# Patient Record
Sex: FEMALE | HISPANIC OR LATINO | Employment: PART TIME | URBAN - METROPOLITAN AREA
[De-identification: names, ages, dates, MRNs, and addresses within clinical notes are randomized per-mention and may not be internally consistent; named-entity substitution may affect disease eponyms.]

---

## 2017-04-05 ENCOUNTER — OFFICE VISIT (OUTPATIENT)
Dept: URGENT CARE | Facility: URGENT CARE | Age: 32
End: 2017-04-05
Payer: COMMERCIAL

## 2017-04-05 VITALS
SYSTOLIC BLOOD PRESSURE: 128 MMHG | OXYGEN SATURATION: 100 % | TEMPERATURE: 99 F | WEIGHT: 154.4 LBS | BODY MASS INDEX: 27.35 KG/M2 | DIASTOLIC BLOOD PRESSURE: 84 MMHG | HEART RATE: 86 BPM

## 2017-04-05 DIAGNOSIS — Z32.00 POSSIBLE PREGNANCY: ICD-10-CM

## 2017-04-05 DIAGNOSIS — Z32.01 PREGNANCY TEST POSITIVE: Primary | ICD-10-CM

## 2017-04-05 LAB — BETA HCG QUAL IFA URINE: POSITIVE

## 2017-04-05 PROCEDURE — 84703 CHORIONIC GONADOTROPIN ASSAY: CPT | Performed by: FAMILY MEDICINE

## 2017-04-05 PROCEDURE — 99213 OFFICE O/P EST LOW 20 MIN: CPT | Performed by: FAMILY MEDICINE

## 2017-04-05 RX ORDER — PRENATAL VIT/IRON FUM/FOLIC AC 27MG-0.8MG
1 TABLET ORAL DAILY
Qty: 100 TABLET | Refills: 0 | Status: SHIPPED | OUTPATIENT
Start: 2017-04-05 | End: 2017-07-03

## 2017-04-05 NOTE — PATIENT INSTRUCTIONS
Okay for tylenol for discomfort.  Watch for cramping and bleeding - this would be concerning for miscarriage.  Follow up with primary or OB    Possible Miscarriage (Threatened )  You may be having a miscarriage.  Common signs of a miscarriage are pain and bleeding. A small amount of bleeding can be normal during the first 3 months of pregnancy. Often the pain and bleeding stop, and you have a normal pregnancy and baby. But heavy bleeding or severe cramping can be an early sign of miscarriage. A  miscarriage  means an unexpected loss of your pregnancy.  At this time, we don t know whether you will have a miscarriage, or if things will clear up and your pregnancy will continue normally. We understand that this is emotionally difficult. There is little we can say to change the way you feel. But understand that miscarriages are common.  About 1 or 2 out of every 10 pregnancies end this way. Some end even before you know you are pregnant. This happens for a number of reasons, and usually we never figure out why. It s important you know that it is not your fault. It didn t happen because you did anything wrong.  Having sex or exercising does not cause a miscarriage. These activities are usually safe unless you have pain or bleeding or your doctor tells you to stop. Even minor falls won t cause a miscarriage. Miscarriages happen because things were not developing as they were supposed to. No medicine can prevent a miscarriage.  Again, understand that things are uncertain right now. You may still have some bleeding. This may be light spotting or like a period, and you may pass some tissue. You may have some cramping. This is why follow-up care is important.  Home care  To improve the chance of keeping your pregnancy, you should take these steps:    Rest in bed until the pain and bleeding stop.    Don t have sex until your health care provider says it s OK.    Use sanitary napkins instead of tampons.    Don t  douche.    Don t take aspirin, ibuprofen, or naproxen.    Don t have alcoholic or caffeinated beverage or smoke.  Follow-up care  Make an appointment with your doctor within the next week, or as directed by our staff.  Note: If you had an ultrasound, a radiologist will review it. You will be told of any new findings that may affect your care.  Call 911  Call 911 if you have:    Severe pain and very heavy bleeding    Severe lightheadedness, passing out, or fainting    Rapid heart rate    Difficulty breathing    Confusion or difficulty waking up  When to seek medical advice  Call your health care provider right away if any of these occur:    Vaginal bleeding or pain that lasts for more than 3 days    Heavy bleeding. This means soaking 1 new pad an hour over 3 hours.    Fever of 100.4 F (38 C) or higher, or as directed by your health care provider    Pain in your lower belly (abdomen) that gets worse    Weakness or dizziness    Passage of anything that resembles tissue. This would be pink or grayish membrane or solid material. Save the tissue in a clean container and bring it to your provider.       0316-5244 [employer]. [address] [place] [address], [place] [address]. All rights reserved. This information is not intended as a substitute for professional medical care. Always follow your healthcare professional's instructions.

## 2017-04-05 NOTE — NURSING NOTE
"Chief Complaint   Patient presents with     Fall     fell down 2 steps last night fell back and landed right hip experiencing dizziness and cramping, discomfort  home pregnancy yesterday was positive        Initial /84 (BP Location: Right arm, Cuff Size: Adult Regular)  Pulse 86  Temp 99  F (37.2  C) (Oral)  Wt 154 lb 6.4 oz (70 kg)  SpO2 100%  BMI 27.35 kg/m2 Estimated body mass index is 27.35 kg/(m^2) as calculated from the following:    Height as of 10/7/15: 5' 3\" (1.6 m).    Weight as of this encounter: 154 lb 6.4 oz (70 kg).  Medication Reconciliation: complete   Manju Velazquez MA      "

## 2017-04-05 NOTE — MR AVS SNAPSHOT
After Visit Summary   2017    Rosette Aguilar    MRN: [id number]           Patient Information     Date Of Birth          1985        Visit Information        Provider Department      2017 9:00 AM Marquise Chacko MD [facility]        Today's Diagnoses     Possible pregnancy    -  1    Pregnancy test positive          Care Instructions    Okay for tylenol for discomfort.  Watch for cramping and bleeding - this would be concerning for miscarriage.  Follow up with primary or OB    Possible Miscarriage (Threatened )  You may be having a miscarriage.  Common signs of a miscarriage are pain and bleeding. A small amount of bleeding can be normal during the first 3 months of pregnancy. Often the pain and bleeding stop, and you have a normal pregnancy and baby. But heavy bleeding or severe cramping can be an early sign of miscarriage. A  miscarriage  means an unexpected loss of your pregnancy.  At this time, we don t know whether you will have a miscarriage, or if things will clear up and your pregnancy will continue normally. We understand that this is emotionally difficult. There is little we can say to change the way you feel. But understand that miscarriages are common.  About 1 or 2 out of every 10 pregnancies end this way. Some end even before you know you are pregnant. This happens for a number of reasons, and usually we never figure out why. It s important you know that it is not your fault. It didn t happen because you did anything wrong.  Having sex or exercising does not cause a miscarriage. These activities are usually safe unless you have pain or bleeding or your doctor tells you to stop. Even minor falls won t cause a miscarriage. Miscarriages happen because things were not developing as they were supposed to. No medicine can prevent a miscarriage.  Again, understand that things are uncertain right now. You may still have some bleeding. This may be light spotting  or like a period, and you may pass some tissue. You may have some cramping. This is why follow-up care is important.  Home care  To improve the chance of keeping your pregnancy, you should take these steps:    Rest in bed until the pain and bleeding stop.    Don t have sex until your health care provider says it s OK.    Use sanitary napkins instead of tampons.    Don t douche.    Don t take aspirin, ibuprofen, or naproxen.    Don t have alcoholic or caffeinated beverage or smoke.  Follow-up care  Make an appointment with your doctor within the next week, or as directed by our staff.  Note: If you had an ultrasound, a radiologist will review it. You will be told of any new findings that may affect your care.  Call 911  Call 911 if you have:    Severe pain and very heavy bleeding    Severe lightheadedness, passing out, or fainting    Rapid heart rate    Difficulty breathing    Confusion or difficulty waking up  When to seek medical advice  Call your health care provider right away if any of these occur:    Vaginal bleeding or pain that lasts for more than 3 days    Heavy bleeding. This means soaking 1 new pad an hour over 3 hours.    Fever of 100.4 F (38 C) or higher, or as directed by your health care provider    Pain in your lower belly (abdomen) that gets worse    Weakness or dizziness    Passage of anything that resembles tissue. This would be pink or grayish membrane or solid material. Save the tissue in a clean container and bring it to your provider.       6567-6014 [employer]. [address] [place] [address]. All rights reserved. This information is not intended as a substitute for professional medical care. Always follow your healthcare professional's instructions.              Follow-ups after your visit        Your next 10 appointments already scheduled     Apr 18, 2017  8:30 AM CDT   New Prenatal with EA OB NURSE   [facility] [place] [facility]    [address] [place] [address]  "[facility]  [address]  [place] [address] [phone]            May 19, 2017  9:45 AM CDT   New Prenatal with Allyson Church MD   [facility] [place] [facility]    [address] [facility]  [address]  [place] [address] [phone]              Who to contact     If you have questions or need follow up information about today's clinic visit or your schedule please contact [facility] directly at [phone].  Normal or non-critical lab and imaging results will be communicated to you by Healthagenhart, letter or phone within 4 business days after the clinic has received the results. If you do not hear from us within 7 days, please contact the clinic through Coinplugt or phone. If you have a critical or abnormal lab result, we will notify you by phone as soon as possible.  Submit refill requests through Vigo or call your pharmacy and they will forward the refill request to us. Please allow 3 business days for your refill to be completed.          Additional Information About Your Visit        Vigo Information     Vigo lets you send messages to your doctor, view your test results, renew your prescriptions, schedule appointments and more. To sign up, go to [email or web address] . Click on \"Log in\" on the left side of the screen, which will take you to the Welcome page. Then click on \"Sign up Now\" on the right side of the page.     You will be asked to enter the access code listed below, as well as some personal information. Please follow the directions to create your username and password.     Your access code is: [email or web address]  Expires: 2017 10:05 AM     Your access code will  in 90 days. If you need help or a new code, please call your [place] clinic or [phone].        Care EveryWhere ID     This is your Care EveryWhere ID. This could be used by other organizations to access your [place] medical records  [id number]        Your Vitals " Were     Pulse Temperature Pulse Oximetry BMI (Body Mass Index)          86 99  F (37.2  C) (Oral) 100% 27.35 kg/m2         Blood Pressure from Last 3 Encounters:   04/05/17 128/84   12/14/15 108/70   10/07/15 126/58    Weight from Last 3 Encounters:   04/05/17 154 lb 6.4 oz (70 kg)   12/14/15 149 lb (67.6 kg)   10/07/15 147 lb (66.7 kg)              We Performed the Following     Beta HCG qual IFA urine          Today's Medication Changes          These changes are accurate as of: 4/5/17 10:05 AM.  If you have any questions, ask your nurse or doctor.               Start taking these medicines.        Dose/Directions    prenatal multivitamin  plus iron 27-0.8 MG Tabs per tablet   Used for:  Pregnancy test positive   Started by:  Marquise Chacko MD        Dose:  1 tablet   Take 1 tablet by mouth daily   Quantity:  100 tablet   Refills:  0            Where to get your medicines      These medications were sent to [id number] - [place], [place] - [address]  [address], [facility] [address]     Phone:  [phone]     prenatal multivitamin  plus iron 27-0.8 MG Tabs per tablet                Primary Care Provider Office Phone # Fax #    BRIAN [facility] [phone]       [facility] [address] [facility] [address]        Thank you!     Thank you for choosing [facility]  for your care. Our goal is always to provide you with excellent care. Hearing back from our patients is one way we can continue to improve our services. Please take a few minutes to complete the written survey that you may receive in the mail after your visit with us. Thank you!             Your Updated Medication List - Protect others around you: Learn how to safely use, store and throw away your medicines at www.disposemymeds.org.          This list is accurate as of: 4/5/17 10:05 AM.  Always use your most recent med list.                   Brand Name Dispense Instructions  for use    NO ACTIVE MEDICATIONS          prenatal multivitamin  plus iron 27-0.8 MG Tabs per tablet     100 tablet    Take 1 tablet by mouth daily

## 2017-04-18 ENCOUNTER — PRENATAL OFFICE VISIT (OUTPATIENT)
Dept: NURSING | Facility: CLINIC | Age: 32
End: 2017-04-18
Payer: COMMERCIAL

## 2017-04-18 VITALS
WEIGHT: 155 LBS | HEIGHT: 63 IN | SYSTOLIC BLOOD PRESSURE: 110 MMHG | DIASTOLIC BLOOD PRESSURE: 64 MMHG | BODY MASS INDEX: 27.46 KG/M2

## 2017-04-18 DIAGNOSIS — Z34.81 ENCOUNTER FOR SUPERVISION OF OTHER NORMAL PREGNANCY IN FIRST TRIMESTER: Primary | ICD-10-CM

## 2017-04-18 PROBLEM — Z34.90 SUPERVISION OF NORMAL PREGNANCY: Status: ACTIVE | Noted: 2017-04-18

## 2017-04-18 LAB
ABO + RH BLD: NORMAL
ABO + RH BLD: NORMAL
ALBUMIN UR-MCNC: NEGATIVE MG/DL
APPEARANCE UR: CLEAR
BILIRUB UR QL STRIP: NEGATIVE
BLD GP AB SCN SERPL QL: NORMAL
BLOOD BANK CMNT PATIENT-IMP: NORMAL
COLOR UR AUTO: YELLOW
ERYTHROCYTE [DISTWIDTH] IN BLOOD BY AUTOMATED COUNT: 13.5 % (ref 10–15)
GLUCOSE UR STRIP-MCNC: NEGATIVE MG/DL
HBV SURFACE AG SERPL QL IA: NONREACTIVE
HCT VFR BLD AUTO: 38.1 % (ref 35–47)
HGB BLD-MCNC: 12.6 G/DL (ref 11.7–15.7)
HGB UR QL STRIP: NEGATIVE
HIV 1+2 AB+HIV1 P24 AG SERPL QL IA: NORMAL
KETONES UR STRIP-MCNC: NEGATIVE MG/DL
LEUKOCYTE ESTERASE UR QL STRIP: ABNORMAL
MCH RBC QN AUTO: 30.5 PG (ref 26.5–33)
MCHC RBC AUTO-ENTMCNC: 33.1 G/DL (ref 31.5–36.5)
MCV RBC AUTO: 92 FL (ref 78–100)
NITRATE UR QL: NEGATIVE
NON-SQ EPI CELLS #/AREA URNS LPF: NORMAL /LPF
PH UR STRIP: 7.5 PH (ref 5–7)
PLATELET # BLD AUTO: 259 10E9/L (ref 150–450)
RBC # BLD AUTO: 4.13 10E12/L (ref 3.8–5.2)
RBC #/AREA URNS AUTO: NORMAL /HPF (ref 0–2)
RUBV IGG SERPL IA-ACNC: 52 IU/ML
SP GR UR STRIP: 1.01 (ref 1–1.03)
SPECIMEN EXP DATE BLD: NORMAL
T PALLIDUM IGG+IGM SER QL: NEGATIVE
URN SPEC COLLECT METH UR: ABNORMAL
UROBILINOGEN UR STRIP-ACNC: 0.2 EU/DL (ref 0.2–1)
WBC # BLD AUTO: 8.7 10E9/L (ref 4–11)
WBC #/AREA URNS AUTO: NORMAL /HPF (ref 0–2)

## 2017-04-18 PROCEDURE — 87086 URINE CULTURE/COLONY COUNT: CPT | Performed by: OBSTETRICS & GYNECOLOGY

## 2017-04-18 PROCEDURE — 86850 RBC ANTIBODY SCREEN: CPT | Performed by: OBSTETRICS & GYNECOLOGY

## 2017-04-18 PROCEDURE — 87389 HIV-1 AG W/HIV-1&-2 AB AG IA: CPT | Performed by: OBSTETRICS & GYNECOLOGY

## 2017-04-18 PROCEDURE — 86901 BLOOD TYPING SEROLOGIC RH(D): CPT | Performed by: OBSTETRICS & GYNECOLOGY

## 2017-04-18 PROCEDURE — 36415 COLL VENOUS BLD VENIPUNCTURE: CPT | Performed by: OBSTETRICS & GYNECOLOGY

## 2017-04-18 PROCEDURE — 86780 TREPONEMA PALLIDUM: CPT | Performed by: OBSTETRICS & GYNECOLOGY

## 2017-04-18 PROCEDURE — 85027 COMPLETE CBC AUTOMATED: CPT | Performed by: OBSTETRICS & GYNECOLOGY

## 2017-04-18 PROCEDURE — 86762 RUBELLA ANTIBODY: CPT | Performed by: OBSTETRICS & GYNECOLOGY

## 2017-04-18 PROCEDURE — 99207 ZZC PRENATAL VISIT: CPT

## 2017-04-18 PROCEDURE — 81001 URINALYSIS AUTO W/SCOPE: CPT | Performed by: OBSTETRICS & GYNECOLOGY

## 2017-04-18 PROCEDURE — 87340 HEPATITIS B SURFACE AG IA: CPT | Performed by: OBSTETRICS & GYNECOLOGY

## 2017-04-18 PROCEDURE — 86900 BLOOD TYPING SEROLOGIC ABO: CPT | Performed by: OBSTETRICS & GYNECOLOGY

## 2017-04-18 NOTE — PROGRESS NOTES
"Chief Complaint   Patient presents with     Prenatal Care     New Prenatal Nurse Visit   8w1d    Initial /64 (BP Location: Right arm, Cuff Size: Adult Regular)  Ht 5' 3\" (1.6 m)  Wt 155 lb (70.3 kg)  LMP 02/20/2017 (Approximate)  BMI 27.46 kg/m2 Estimated body mass index is 27.46 kg/(m^2) as calculated from the following:    Height as of this encounter: 5' 3\" (1.6 m).    Weight as of this encounter: 155 lb (70.3 kg).  Medication Reconciliation: complete     Patient is accompanied by  and son. Prenatal book and folder (containing standard educational hand-outs and brochures) given to patient. Information in folder reviewed. Questions answered.     Discussed and gave written information on options available for 1st and 2nd trimester screening, CVS, amniocentesis, AFP, and QUAD screen plus optimal time-frame for testing. Brochure given on optional screening available to determine Cystic Fibrosis carrier status. Pt instructed to check with insurance regarding coverage of these optional tests. Pt advised to call back if she desires testing or has any questions or concerns.  First Trimester Ultrasound ordered and scheduled.    Prenatal labs obtained. New prenatal visit scheduled on 5/19/17 with Dr. Church.  Frances Celis RN      "

## 2017-04-18 NOTE — MR AVS SNAPSHOT
After Visit Summary   4/18/2017    Rosette Aguilar    MRN: [id number]           Patient Information     Date Of Birth          1985        Visit Information        Provider Department      4/18/2017 8:30 AM EA OB NURSE [facility]        Today's Diagnoses     Encounter for supervision of other normal pregnancy in first trimester    -  1       Follow-ups after your visit        Your next 10 appointments already scheduled     Apr 19, 2017  9:45 AM CDT   US OB < 14 WEEKS SINGLE with OXUS1   [facility]    [address] [id number] [phone]           Please bring a list of your medicines (including vitamins, minerals and over-the-counter drugs). Also, tell your doctor about any allergies you may have. Wear comfortable clothes and leave your valuables at home.  If you re less than 20 weeks drink four 8-ounce glasses of fluid an hour before your exam. If you need to empty your bladder before your exam, try to release only a little urine. Then, drink another glass of fluid.  You may have up to two family members in the exam room. If you bring a small child, an adult must be there to care for him or her.  Please call the Imaging Department at your exam site with any questions.            May 19, 2017  9:45 AM CDT   New Prenatal with Allyson Church MD   [facility] ([facility] [place])    [address] [place] [address] [address]  [facility] [id number] [phone]              Future tests that were ordered for you today     Open Future Orders        Priority Expected Expires Ordered    US OB < 14 Weeks Single Routine  4/18/2018 4/18/2017            Who to contact     If you have questions or need follow up information about today's clinic visit or your schedule please contact [facility] directly at [phone].  Normal or non-critical lab and imaging results will be  "communicated to you by MyChart, letter or phone within 4 business days after the clinic has received the results. If you do not hear from us within 7 days, please contact the clinic through PushButton Labst or phone. If you have a critical or abnormal lab result, we will notify you by phone as soon as possible.  Submit refill requests through VirtualU or call your pharmacy and they will forward the refill request to us. Please allow 3 business days for your refill to be completed.          Additional Information About Your Visit        VirtualU Information     VirtualU lets you send messages to your doctor, view your test results, renew your prescriptions, schedule appointments and more. To sign up, go to [email or web address] . Click on \"Log in\" on the left side of the screen, which will take you to the Welcome page. Then click on \"Sign up Now\" on the right side of the page.     You will be asked to enter the access code listed below, as well as some personal information. Please follow the directions to create your username and password.     Your access code is: [email or web address]  Expires: 2017 10:05 AM     Your access code will  in 90 days. If you need help or a new code, please call your [place] clinic or [phone].        Care EveryWhere ID     This is your Care EveryWhere ID. This could be used by other organizations to access your [place] medical records  [id number]        Your Vitals Were     Height Last Period BMI (Body Mass Index)             5' 3\" (1.6 m) 2017 (Approximate) 27.46 kg/m2          Blood Pressure from Last 3 Encounters:   17 110/64   17 128/84   12/14/15 108/70    Weight from Last 3 Encounters:   17 155 lb (70.3 kg)   17 154 lb 6.4 oz (70 kg)   12/14/15 149 lb (67.6 kg)              We Performed the Following     ABO/RH TYPE AND SCREEN     Anti Treponema     CBC WITH PLATELETS     Hepatitis B surface antigen     HIV Antigen Antibody Combo     [place]ella Antibody " IgG Quantitative     Urine Culture Aerobic Bacterial     URINE MACROSCOPIC WITH REFLEX TO MICRO        Primary Care Provider Office Phone # Fax #    BRIAN [facility] [phone]       [facility] [address]  [facility] [address]        Thank you!     Thank you for choosing [facility]  for your care. Our goal is always to provide you with excellent care. Hearing back from our patients is one way we can continue to improve our services. Please take a few minutes to complete the written survey that you may receive in the mail after your visit with us. Thank you!             Your Updated Medication List - Protect others around you: Learn how to safely use, store and throw away your medicines at www.disposemymeds.org.          This list is accurate as of: 4/18/17  9:43 AM.  Always use your most recent med list.                   Brand Name Dispense Instructions for use    prenatal multivitamin  plus iron 27-0.8 MG Tabs per tablet     100 tablet    Take 1 tablet by mouth daily

## 2017-04-19 ENCOUNTER — RADIANT APPOINTMENT (OUTPATIENT)
Dept: ULTRASOUND IMAGING | Facility: CLINIC | Age: 32
End: 2017-04-19
Attending: OBSTETRICS & GYNECOLOGY
Payer: COMMERCIAL

## 2017-04-19 DIAGNOSIS — Z34.81 ENCOUNTER FOR SUPERVISION OF OTHER NORMAL PREGNANCY IN FIRST TRIMESTER: ICD-10-CM

## 2017-04-19 LAB
BACTERIA SPEC CULT: NORMAL
MICRO REPORT STATUS: NORMAL
SPECIMEN SOURCE: NORMAL

## 2017-04-19 PROCEDURE — 76801 OB US < 14 WKS SINGLE FETUS: CPT | Performed by: OBSTETRICS & GYNECOLOGY

## 2017-05-19 ENCOUNTER — PRENATAL OFFICE VISIT (OUTPATIENT)
Dept: OBGYN | Facility: CLINIC | Age: 32
End: 2017-05-19
Payer: COMMERCIAL

## 2017-05-19 VITALS
HEART RATE: 88 BPM | WEIGHT: 162 LBS | SYSTOLIC BLOOD PRESSURE: 110 MMHG | DIASTOLIC BLOOD PRESSURE: 80 MMHG | BODY MASS INDEX: 28.7 KG/M2

## 2017-05-19 DIAGNOSIS — Z34.81 ENCOUNTER FOR SUPERVISION OF OTHER NORMAL PREGNANCY IN FIRST TRIMESTER: Primary | ICD-10-CM

## 2017-05-19 PROCEDURE — 87591 N.GONORRHOEAE DNA AMP PROB: CPT | Performed by: OBSTETRICS & GYNECOLOGY

## 2017-05-19 PROCEDURE — 87491 CHLMYD TRACH DNA AMP PROBE: CPT | Performed by: OBSTETRICS & GYNECOLOGY

## 2017-05-19 PROCEDURE — 99207 ZZC PRENATAL VISIT: CPT | Performed by: OBSTETRICS & GYNECOLOGY

## 2017-05-19 NOTE — MR AVS SNAPSHOT
After Visit Summary   5/19/2017    Rosette Aguilar    MRN: [id number]           Patient Information     Date Of Birth          1985        Visit Information        Provider Department      5/19/2017 9:45 AM Allyson Church MD [facility]        Today's Diagnoses     Encounter for supervision of other normal pregnancy in first trimester    -  1      Care Instructions    Call the clinic ([facility] [phone], [place] [phone]) right away with any of the following concerns:    1.   Severe abdominal pain or cramping, that does not go away with rest and hydration    2.   Vaginal bleeding.      Continue your prenatal vitamins daily.    Please call with any additional concerns that your have, and continue your prenatal visits every four weeks!        Follow-ups after your visit        Your next 10 appointments already scheduled     Jun 13, 2017  3:00 PM CDT   ESTABLISHED PRENATAL with Allyson Church MD   [facility]    [address] [place] [id number] [phone]            Jul 14, 2017  4:00 PM CDT   ESTABLISHED PRENATAL with Allyson Church MD   [facility] ([facility])    [address]  [address]  [facility] [address] [phone]              Who to contact     If you have questions or need follow up information about today's clinic visit or your schedule please contact [facility] directly at [phone].  Normal or non-critical lab and imaging results will be communicated to you by MyChart, letter or phone within 4 business days after the clinic has received the results. If you do not hear from us within 7 days, please contact the clinic through MyChart or phone. If you have a critical or abnormal lab result, we will notify you by phone as soon as possible.  Submit refill requests through Phloronol or call your pharmacy and they will forward the refill  "request to us. Please allow 3 business days for your refill to be completed.          Additional Information About Your Visit        MyChart Information     Qnekthart lets you send messages to your doctor, view your test results, renew your prescriptions, schedule appointments and more. To sign up, go to [email or web address] . Click on \"Log in\" on the left side of the screen, which will take you to the Welcome page. Then click on \"Sign up Now\" on the right side of the page.     You will be asked to enter the access code listed below, as well as some personal information. Please follow the directions to create your username and password.     Your access code is: [email or web address]  Expires: 2017 10:05 AM     Your access code will  in 90 days. If you need help or a new code, please call your [place] clinic or [phone].        Care EveryWhere ID     This is your [facility] EveryWhere ID. This could be used by other organizations to access your [place] medical records  [id number]        Your Vitals Were     Pulse Last Period BMI (Body Mass Index)             88 2017 (Approximate) 28.7 kg/m2          Blood Pressure from Last 3 Encounters:   17 110/80   17 110/64   17 128/84    Weight from Last 3 Encounters:   17 162 lb (73.5 kg)   17 155 lb (70.3 kg)   17 154 lb 6.4 oz (70 kg)              We Performed the Following     CHLAMYDIA TRACHOMATIS PCR     NEISSERIA GONORRHOEA PCR        Primary Care Provider Office Phone # Fax #    BRIAN [facility] [phone]       [facility] [place] [address] [address] [facility] [address]        Thank you!     Thank you for choosing [facility]  for your care. Our goal is always to provide you with excellent care. Hearing back from our patients is one way we can continue to improve our services. Please take a few minutes to complete the written survey that you may receive in the mail after your visit with " us. Thank you!             Your Updated Medication List - Protect others around you: Learn how to safely use, store and throw away your medicines at www.disposemymeds.org.          This list is accurate as of: 5/19/17 12:05 PM.  Always use your most recent med list.                   Brand Name Dispense Instructions for use    prenatal multivitamin  plus iron 27-0.8 MG Tabs per tablet     100 tablet    Take 1 tablet by mouth daily

## 2017-05-19 NOTE — PATIENT INSTRUCTIONS
Call the clinic ([place] [phone], [place] [phone]) right away with any of the following concerns:    1.   Severe abdominal pain or cramping, that does not go away with rest and hydration    2.   Vaginal bleeding.      Continue your prenatal vitamins daily.    Please call with any additional concerns that your have, and continue your prenatal visits every four weeks!

## 2017-05-19 NOTE — NURSING NOTE
"Chief Complaint   Patient presents with     Prenatal Care     new prenatal visit - US done 04/19/17     11w0d    Initial /80 (BP Location: Right arm, Patient Position: Chair, Cuff Size: Adult Regular)  Pulse 88  Wt 162 lb (73.5 kg)  LMP 02/20/2017 (Approximate)  BMI 28.7 kg/m2 Estimated body mass index is 28.7 kg/(m^2) as calculated from the following:    Height as of 4/18/17: 5' 3\" (1.6 m).    Weight as of this encounter: 162 lb (73.5 kg).  Medication Reconciliation: complete     Nurse assisted visit.  Sharon Lee MA.      "

## 2017-05-19 NOTE — PROGRESS NOTES
SUBJECTIVE: Rosette Aguilar is an 31 year old female  Obstetric History       T2      TAB0   SAB0   E0   M0   L2     here for initial OB visit. Her  present for visit today.     Hx of gestational diabetes with 1st pregnancy on insulin. She changed her diet during 2nd pregnancy and did not have gestational diabetes. She reports trying to eat healthy during this pregnancy. Doing well, denies bleeding, cramping. She denies nausea or vomiting. She notes intermittent constipation; she has been drinking prune juice. She also reports having mild vaginal spotting 1 month ago.     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 She denies heavy physical work or exposure to harmful chemicals. She does not wear gloves.   Does report varicose veins due to her job standing.    She plans to breast feed.     Past Medical History of Father of Baby: Diabetes, Thyroid disease    Employment: Laundry service.     Dating: changed based on first trimester scan     History reviewed. No pertinent past medical history.       Past Surgical History:   Procedure Laterality Date     LASIK BILATERAL            Family History   Problem Relation Age of Onset     DIABETES Maternal Grandmother      CANCER Maternal Grandmother      Ovarian     Family History Negative Paternal Grandfather      Family History Negative Paternal Grandmother      DIABETES Mother      Prostate Cancer Maternal Grandfather      Colon Cancer Maternal Grandfather          Social History     Social History     Marital status:      Spouse name: N/A     Number of children: N/A     Years of education: N/A     Occupational History     Not on file.     Social History Main Topics     Smoking status: Never Smoker     Smokeless tobacco: Never Used     Alcohol use No     Drug use: No     Sexual activity: Yes     Partners: Male     Other Topics Concern     Parent/Sibling W/ Cabg, Mi Or Angioplasty Before 65f 55m? No     Social  History Narrative     This document serves as a record of the services and decisions personally performed and made by Allyson Church MD. It was created on her behalf by Lamont Zavala, a trained medical scribe. The creation of this document is based the provider's statements to the medical scribe.  Lamont Zavala May 19, 2017 10:30 AM    Review of Systems:   CONSTITUTIONAL:NEGATIVE for fever, chills, change in weight  INTEGUMENTARY/SKIN: NEGATIVE for worrisome rashes, moles or lesions  RESP:NEGATIVE for significant cough or SOB  BREAST: NEGATIVE for masses, tenderness or discharge  CV: NEGATIVE for chest pain, palpitations or peripheral edema  GI: NEGATIVE for abdominal pain, heartburn, or change in bowel habits: +nausea  : negative for, dysuria, vaginal discharge and bleeding  MUSCULOSKELETAL: NEGATIVE for significant arthralgias or myalgia  NEURO: NEGATIVE for weakness, dizziness or paresthesias  PSYCHIATRIC: NEGATIVE for changes in mood or affect    EXAM: /80 (BP Location: Right arm, Patient Position: Chair, Cuff Size: Adult Regular)  Pulse 88  Wt 73.5 kg (162 lb)  LMP 02/20/2017 (Approximate)  BMI 28.7 kg/m2  GENERAL APPEARANCE: healthy, alert and no distress  NECK: no adenopathy, no asymmetry, masses, or scars and thyroid normal to palpation  RESP: lungs clear to auscultation - no rales, rhonchi or wheezes  BREAST: normal without masses, tenderness or nipple discharge and no palpable axillary masses or adenopathy  CV: regular rates and rhythm, normal S1 S2, no S3 or S4 and no murmur, click or rub, cardiac activity and fetal movement seen on bedside US  ABDOMEN:  soft, nontender, no HSM or masses and bowel sounds normal      Pelvix exam:  Perineum: Normal;   Vulva: Normal genitalia;  Vagina: Normal mucosa, no discharge,   Cervix: Parous, closed, mobile, no discharge;  Uterus: 11 weeks,  Normal shape, position and consistency;   Adnexa: Normal;  Rectum: Normal without lesion or mass;   Bony  Pelvis: Adequate.     Bedside US confirms fetal pole with + cardiac activity    ASSESSMENT/ PLAN:  Follow up in 4 weeks.  Weight gain: overweight BMI (25-29.9): 15-25 lbs (0.6 lbs/wk)  Patient counselled on prenatal testing options to include 1st trimester screening, quad screen, cystic fibrosis screening, and age related testing as appropriate for aneuploiding including possibly chorionic villus sampling, amniocentesis, level II ultrasound.   Patient declines further testing.    1) Discussed early glucose test at next visit. If normal, check again at 18-24 weeks.   2) Reviewed healthy diet, avoiding simple carbohydrates and processed carbs  3) Discussed treatment of constipation including stool softeners and high fiber diet.  4) Reviewed her work environment, recommended she wear gloves if she feels necessary. Ordered compression socks.   5) Talked about appropriate exercises during pregnancy including walking and light weight lifting.      The information in this document, created by the medical scribe for me, accurately reflects the services I personally performed and the decisions made by me. I have reviewed and approved this document for accuracy prior to leaving the patient care area.  5/19/2017        Allyson Church M.D.

## 2017-05-21 LAB
C TRACH DNA SPEC QL NAA+PROBE: NORMAL
N GONORRHOEA DNA SPEC QL NAA+PROBE: NORMAL
SPECIMEN SOURCE: NORMAL
SPECIMEN SOURCE: NORMAL

## 2017-06-13 ENCOUNTER — PRENATAL OFFICE VISIT (OUTPATIENT)
Dept: OBGYN | Facility: CLINIC | Age: 32
End: 2017-06-13
Payer: COMMERCIAL

## 2017-06-13 VITALS
SYSTOLIC BLOOD PRESSURE: 106 MMHG | WEIGHT: 164.5 LBS | HEART RATE: 80 BPM | DIASTOLIC BLOOD PRESSURE: 70 MMHG | BODY MASS INDEX: 29.14 KG/M2

## 2017-06-13 DIAGNOSIS — Z34.82 ENCOUNTER FOR SUPERVISION OF OTHER NORMAL PREGNANCY IN SECOND TRIMESTER: Primary | ICD-10-CM

## 2017-06-13 PROBLEM — Z34.81 ENCOUNTER FOR SUPERVISION OF OTHER NORMAL PREGNANCY IN FIRST TRIMESTER: Status: RESOLVED | Noted: 2017-06-13 | Resolved: 2017-06-13

## 2017-06-13 PROBLEM — Z34.81 ENCOUNTER FOR SUPERVISION OF OTHER NORMAL PREGNANCY IN FIRST TRIMESTER: Status: ACTIVE | Noted: 2017-06-13

## 2017-06-13 PROCEDURE — 99207 ZZC PRENATAL VISIT: CPT | Performed by: OBSTETRICS & GYNECOLOGY

## 2017-06-13 PROCEDURE — 36415 COLL VENOUS BLD VENIPUNCTURE: CPT | Performed by: OBSTETRICS & GYNECOLOGY

## 2017-06-13 PROCEDURE — 82950 GLUCOSE TEST: CPT | Performed by: OBSTETRICS & GYNECOLOGY

## 2017-06-13 NOTE — PATIENT INSTRUCTIONS
Call the clinic ([place] [phone], [place] [phone]) right away with any of the following concerns:    1.   Severe abdominal pain or cramping, that does not go away with rest and hydration    2.   Vaginal bleeding.      Continue your prenatal vitamins daily.    Please call with any additional concerns that your have, and continue your prenatal visits every four weeks!

## 2017-06-13 NOTE — MR AVS SNAPSHOT
After Visit Summary   6/13/2017    Rosette Aguilar    MRN: [id number]           Patient Information     Date Of Birth          1985        Visit Information        Provider Department      6/13/2017 3:00 PM Allyson Church MD [facility]        Today's Diagnoses     Encounter for supervision of other normal pregnancy in second trimester    -  1      Care Instructions    Call the clinic ([facility] [phone], [place] [phone]) right away with any of the following concerns:    1.   Severe abdominal pain or cramping, that does not go away with rest and hydration    2.   Vaginal bleeding.      Continue your prenatal vitamins daily.    Please call with any additional concerns that your have, and continue your prenatal visits every four weeks!          Follow-ups after your visit        Your next 10 appointments already scheduled     Jul 14, 2017  4:00 PM CDT   ESTABL[facility]son [facility] MD   [facility]    [address]  [facility] [address] [phone]              Who to contact     If you have questions or need follow up information about today's clinic visit or your schedule please contact [facility] directly at [phone].  Normal or non-critical lab and imaging results will be communicated to you by MyChart, letter or phone within 4 business days after the clinic has received the results. If you do not hear from us within 7 days, please contact the clinic through MyChart or phone. If you have a critical or abnormal lab result, we will notify you by phone as soon as possible.  Submit refill requests through [employer] or call your pharmacy and they will forward the refill request to us. Please allow 3 business days for your refill to be completed.          Additional Information About Your Visit        Expediciones.mxhart Information     [employer] lets you send messages to your doctor,  "view your test results, renew your prescriptions, schedule appointments and more. To sign up, go to [email or web address] . Click on \"Log in\" on the left side of the screen, which will take you to the Welcome page. Then click on \"Sign up Now\" on the right side of the page.     You will be asked to enter the access code listed below, as well as some personal information. Please follow the directions to create your username and password.     Your access code is: [email or web address]  Expires: 2017 10:05 AM     Your access code will  in 90 days. If you need help or a new code, please call your [place] clinic or [phone].        Care EveryWhere ID     This is your Care EveryWhere ID. This could be used by other organizations to access your [place] medical records  [id number]        Your Vitals Were     Pulse Last Period BMI (Body Mass Index)             80 2017 (Approximate) 29.14 kg/m2          Blood Pressure from Last 3 Encounters:   17 106/70   17 110/80   17 110/64    Weight from Last 3 Encounters:   17 164 lb 8 oz (74.6 kg)   17 162 lb (73.5 kg)   17 155 lb (70.3 kg)              We Performed the Following     Glucose tolerance gest screen 1 hour     US OB > 14 Weeks Complete Single        Primary Care Provider Office Phone # Fax #    BRIAN Mack Ra, [phone]       [facility] [address]  [facility] [address]        Thank you!     Thank you for choosing [facility]  for your care. Our goal is always to provide you with excellent care. Hearing back from our patients is one way we can continue to improve our services. Please take a few minutes to complete the written survey that you may receive in the mail after your visit with us. Thank you!             Your Updated Medication List - Protect others around you: Learn how to safely use, store and throw away your medicines at www.disposemymeds.org.        "   This list is accurate as of: 6/13/17  4:29 PM.  Always use your most recent med list.                   Brand Name Dispense Instructions for use    prenatal multivitamin  plus iron 27-0.8 MG Tabs per tablet     100 tablet    Take 1 tablet by mouth daily

## 2017-06-13 NOTE — NURSING NOTE
"Chief Complaint   Patient presents with     Prenatal Care   14w4d  Declines quad  Early GCT today    Initial /70 (BP Location: Left arm, Patient Position: Sitting, Cuff Size: Adult Regular)  Pulse 80  Wt 164 lb 8 oz (74.6 kg)  LMP 02/20/2017 (Approximate)  BMI 29.14 kg/m2 Estimated body mass index is 29.14 kg/(m^2) as calculated from the following:    Height as of 4/18/17: 5' 3\" (1.6 m).    Weight as of this encounter: 164 lb 8 oz (74.6 kg).  Medication Reconciliation: complete    "

## 2017-06-13 NOTE — PROGRESS NOTES
CC: Rosette Aguilar is a 31 year old who presents for routine prenatal visit @ 14w4d       HPI: Hx of gestational diabetes with 1st regnancy on insulin. Changed diet during 2nd pregnancy and did not have gestational diabetes. Today patient reports that she is trying to follow a generally healthy diet. She also notes some dry skin around her nostrils. She denies seasonal allergies, increase in blowing nose, or using any new products. Denies vaginal bleeding, regular contractions, loss of fluid; +fetal movement reported.      This document serves as a record of the services and decisions personally performed and made by Allyson Church MD. It was created on her behalf by Yuridia Wasserman, a trained medical scribe. The creation of this document is based the provider's statements to the medical scribe.  Yuridia Wasserman 2017 2:58 PM      Exam:   See OB flowsheet    ASSESSMENT/PLAN  Rosette Aguilar is a 31 year old   @ 14w4d     1)  Return to clinic in 4 weeks  2) GCT today- hx of gestational diabetes  3) Ordered US- 18-20 week fetal anatomy scan  4) Discussed normal weight gain during pregnancy  5) Declines quad screen    The information in this document, created by the medical scribe for me, accurately reflects the services I personally performed and the decisions made by me. I have reviewed and approved this document for accuracy prior to leaving the patient care area.  2017 2:58 PM         Allyosn Church M.D.

## 2017-06-14 LAB — GLUCOSE 1H P 50 G GLC PO SERPL-MCNC: 124 MG/DL (ref 60–129)

## 2017-07-03 DIAGNOSIS — Z32.01 PREGNANCY TEST POSITIVE: ICD-10-CM

## 2017-07-03 DIAGNOSIS — Z34.90 SUPERVISION OF NORMAL PREGNANCY: Primary | ICD-10-CM

## 2017-07-03 RX ORDER — PRENATAL VIT/IRON FUM/FOLIC AC 27MG-0.8MG
1 TABLET ORAL DAILY
Qty: 100 TABLET | Refills: 3 | Status: SHIPPED | OUTPATIENT
Start: 2017-07-03

## 2017-07-03 NOTE — TELEPHONE ENCOUNTER
Prescription approved per [facility] Refill Protocol.  Frances Celis RN

## 2017-07-03 NOTE — TELEPHONE ENCOUNTER
Prenatal Vit-Fe Fumarate-FA (PRENATAL MULTIVITAMIN  PLUS IRON) 27-0.8 MG TABS per tablet      Last Written Prescription Date: 4/5/2017  Last Fill Quantity: 100,  # refills: 0   Last Office Visit with [facility], [facility] or [facility] prescribing provider: 6/13/2017                                         Next 5 appointments (look out 90 days)     Jul 14, 2017  4:00 PM CDT   ESTABLISHED PRENATAL with Allyson Church MD   [facility]    [address] [place] [address] [phone]

## 2017-07-14 ENCOUNTER — PRENATAL OFFICE VISIT (OUTPATIENT)
Dept: OBGYN | Facility: CLINIC | Age: 32
End: 2017-07-14
Payer: COMMERCIAL

## 2017-07-14 VITALS
DIASTOLIC BLOOD PRESSURE: 70 MMHG | WEIGHT: 173 LBS | HEART RATE: 92 BPM | BODY MASS INDEX: 30.65 KG/M2 | SYSTOLIC BLOOD PRESSURE: 104 MMHG

## 2017-07-14 DIAGNOSIS — Z34.82 ENCOUNTER FOR SUPERVISION OF OTHER NORMAL PREGNANCY IN SECOND TRIMESTER: Primary | ICD-10-CM

## 2017-07-14 PROCEDURE — 99207 ZZC PRENATAL VISIT: CPT | Performed by: OBSTETRICS & GYNECOLOGY

## 2017-07-14 NOTE — MR AVS SNAPSHOT
After Visit Summary   7/14/2017    Rosette Aguilar    MRN: [id number]           Patient Information     Date Of Birth          1985        Visit Information        Provider Department      7/14/2017 4:00 PM Allyson Church MD [facility]        Today's Diagnoses     Encounter for supervision of other normal pregnancy in second trimester    -  1      Care Instructions    Call the clinic ([place] [phone], [place] [phone]) right away with any of the following concerns:    1.   Severe abdominal pain or cramping, that does not go away with rest and hydration    2.   Vaginal bleeding.      Continue your prenatal vitamins daily.    Please call with any additional concerns that your have, and continue your prenatal visits every four weeks!            Follow-ups after your visit        Your next 10 appointments already scheduled     Jul 21, 2017  3:30 PM CDT   US OB > 14 WEEKS COMPLETE SINGLE with RIUS1   [facility]    [address]  [address]  [facility] [address] [phone]           Please bring a list of your medicines (including vitamins, minerals and over-the-counter drugs). Also, tell your doctor about any allergies you may have. Wear comfortable clothes and leave your valuables at home.  If you re less than 20 weeks drink four 8-ounce glasses of fluid an hour before your exam. If you need to empty your bladder before your exam, try to release only a little urine. Then, drink another glass of fluid.  You may have up to two family members in the exam room. If you bring a small child, an adult must be there to care for him or her.  Please call the Imaging Department at your exam site with any questions.            Aug 08, 2017  4:30 PM CDT   ESTABLISHED PRENATAL with Sebastian Sanford MD   [facility] [place] ([facility])    [address]  [address]  [facility] [address]  "  [phone]            Sep 08, 2017  4:00 PM CDT   ESTABLISHED PRENATAL with Allyson Church MD   [facility] [place] [facility]    [address]  [address]  [place] [address] [phone]              Who to contact     If you have questions or need follow up information about today's clinic visit or your schedule please contact [facility] directly at [phone].  Normal or non-critical lab and imaging results will be communicated to you by MyChart, letter or phone within 4 business days after the clinic has received the results. If you do not hear from us within 7 days, please contact the clinic through [employer] or phone. If you have a critical or abnormal lab result, we will notify you by phone as soon as possible.  Submit refill requests through [employer] or call your pharmacy and they will forward the refill request to us. Please allow 3 business days for your refill to be completed.          Additional Information About Your Visit        AccupalV[facility] Administration Medical CenterPeriGen Information     [employer] lets you send messages to your doctor, view your test results, renew your prescriptions, schedule appointments and more. To sign up, go to [email or web address] . Click on \"Log in\" on the left side of the screen, which will take you to the Welcome page. Then click on \"Sign up Now\" on the right side of the page.     You will be asked to enter the access code listed below, as well as some personal information. Please follow the directions to create your username and password.     Your access code is: [email or web address]  Expires: 10/12/2017  5:00 PM     Your access code will  in 90 days. If you need help or a new code, please call your [facility] or [phone].        Care EveryWhere ID     This is your Care EveryWhere ID. This could be used by other organizations to access your [place] medical records  [id number]        Your Vitals Were     Pulse Last Period BMI (Body Mass " Index)             92 02/20/2017 (Approximate) 30.65 kg/m2          Blood Pressure from Last 3 Encounters:   07/14/17 104/70   06/13/17 106/70   05/19/17 110/80    Weight from Last 3 Encounters:   07/14/17 173 lb (78.5 kg)   06/13/17 164 lb 8 oz (74.6 kg)   05/19/17 162 lb (73.5 kg)              Today, you had the following     No orders found for display       Primary Care Provider Office Phone # Fax #    Natali Woods BRIAN Izquierdo [facility] [phone]       [facility] [place] [address]  [place] [address]        Equal Access to Services     LAZARO GOMEZ : Hadii duane godinez hadasho Soomaali, waaxda luqadaha, qaybta kaalmada adeegyada, caridad shoemaker . So [facility] [phone].    ATENCIÓN: Si habla español, tiene a collins disposición servicios gratuitos de asistencia lingüística. Llame al [phone].    We comply with applicable federal civil rights laws and [place] laws. We do not discriminate on the basis of race, color, national origin, age, disability sex, sexual orientation or gender identity.            Thank you!     Thank you for choosing [facility]  for your care. Our goal is always to provide you with excellent care. Hearing back from our patients is one way we can continue to improve our services. Please take a few minutes to complete the written survey that you may receive in the mail after your visit with us. Thank you!             Your Updated Medication List - Protect others around you: Learn how to safely use, store and throw away your medicines at www.disposemymeds.org.          This list is accurate as of: 7/14/17  5:00 PM.  Always use your most recent med list.                   Brand Name Dispense Instructions for use Diagnosis    prenatal multivitamin  plus iron 27-0.8 MG Tabs per tablet     100 tablet    Take 1 tablet by mouth daily    Pregnancy test positive

## 2017-07-14 NOTE — PROGRESS NOTES
CC: Rosette Aguilar is a 31 year old who presents for routine prenatal visit @ 19w0d       HPI:  Reports swelling of feet bilaterally on and off. Otherwise doing well, denies bleeding, cramping. Trying to follow a generally healthy diet. H/o gestational diabetes with 1st pregnancy on insulin; changed diet during 2nd pregnancy and did not have gestational diabetes.    This document serves as a record of the services and decisions personally performed and made by Allyson Church MD. It was created on her behalf by Yuridia Wasserman, a trained medical scribe. The creation of this document is based the provider's statements to the medical scribe.  Yuridia Wasserman 2017 4:16 PM      Exam:   See OB flowsheet    ASSESSMENT/PLAN  Rosette Aguilar is a 31 year old   @ 19w0d     1)  Return to clinic in 4 weeks  2)  GCT normal. Repeat @28w.  3)  US @18-20w  4)  BP ok, weight gain elevated- advised healthy diet and regular exercise        The information in this document, created by the medical scribe for me, accurately reflects the services I personally performed and the decisions made by me. I have reviewed and approved this document for accuracy prior to leaving the patient care area.  2017 4:16 PM     Allyson Church M.D.

## 2017-07-14 NOTE — PATIENT INSTRUCTIONS
Call the clinic ([place] [phone], [place] [phone]) right away with any of the following concerns:    1.   Severe abdominal pain or cramping, that does not go away with rest and hydration    2.   Vaginal bleeding.      Continue your prenatal vitamins daily.    Please call with any additional concerns that your have, and continue your prenatal visits every four weeks!

## 2017-07-14 NOTE — NURSING NOTE
"Chief Complaint   Patient presents with     Prenatal Care     20 week US next week     19w0d    Initial /70 (BP Location: Right arm, Patient Position: Chair, Cuff Size: Adult Regular)  Pulse 92  Wt 173 lb (78.5 kg)  LMP 02/20/2017 (Approximate)  BMI 30.65 kg/m2 Estimated body mass index is 30.65 kg/(m^2) as calculated from the following:    Height as of 4/18/17: 5' 3\" (1.6 m).    Weight as of this encounter: 173 lb (78.5 kg).  Medication Reconciliation: complete     Nurse assisted visit.  Sharon Lee MA.      "

## 2017-07-21 ENCOUNTER — RADIANT APPOINTMENT (OUTPATIENT)
Dept: ULTRASOUND IMAGING | Facility: CLINIC | Age: 32
End: 2017-07-21
Attending: OBSTETRICS & GYNECOLOGY
Payer: COMMERCIAL

## 2017-07-21 PROCEDURE — 76805 OB US >/= 14 WKS SNGL FETUS: CPT | Performed by: OBSTETRICS & GYNECOLOGY

## 2017-08-02 ENCOUNTER — HOSPITAL ENCOUNTER (OUTPATIENT)
Facility: CLINIC | Age: 32
Discharge: HOME | End: 2017-08-02
Attending: FAMILY MEDICINE | Admitting: FAMILY MEDICINE
Payer: COMMERCIAL

## 2017-08-02 ENCOUNTER — OFFICE VISIT (OUTPATIENT)
Dept: URGENT CARE | Facility: URGENT CARE | Age: 32
End: 2017-08-02

## 2017-08-02 VITALS
SYSTOLIC BLOOD PRESSURE: 110 MMHG | TEMPERATURE: 98.3 F | OXYGEN SATURATION: 99 % | WEIGHT: 172.5 LBS | DIASTOLIC BLOOD PRESSURE: 70 MMHG | HEART RATE: 94 BPM | BODY MASS INDEX: 30.56 KG/M2

## 2017-08-02 VITALS
WEIGHT: 172 LBS | DIASTOLIC BLOOD PRESSURE: 75 MMHG | BODY MASS INDEX: 28.66 KG/M2 | TEMPERATURE: 99 F | HEIGHT: 65 IN | RESPIRATION RATE: 16 BRPM | SYSTOLIC BLOOD PRESSURE: 117 MMHG | HEART RATE: 83 BPM

## 2017-08-02 DIAGNOSIS — Z53.9 DIAGNOSIS NOT YET DEFINED: Primary | ICD-10-CM

## 2017-08-02 PROCEDURE — 25000132 ZZH RX MED GY IP 250 OP 250 PS 637: Performed by: FAMILY MEDICINE

## 2017-08-02 PROCEDURE — 99213 OFFICE O/P EST LOW 20 MIN: CPT

## 2017-08-02 RX ORDER — ACETAMINOPHEN 325 MG/1
650-975 TABLET ORAL EVERY 4 HOURS PRN
Status: DISCONTINUED | OUTPATIENT
Start: 2017-08-02 | End: 2017-08-02 | Stop reason: HOSPADM

## 2017-08-02 RX ADMIN — ACETAMINOPHEN 975 MG: 325 TABLET, FILM COATED ORAL at 18:25

## 2017-08-02 NOTE — DISCHARGE INSTRUCTIONS
Discharge Instruction for Undelivered Patients      You were seen for: Decreased fetal movement after a fall  We Consulted: Dr Cooley  You had (Test or Medicine):Tylenol and hot pack for pain, checked for fetal heart beat.     Diet:   Drink 8 to 12 glasses of liquids (milk, juice, water) every day.  You may eat meals and snacks.     Activity:  Call your doctor or nurse midwife if your baby is moving less than usual.     Call your provider if you notice:  Signs of bladder infection: pain when you urinate (use the toilet), need to go more often and more urgently.  The bag of manrique (rupture of membranes) breaks, or you notice leaking in your underwear.  Bright red blood in your underwear.  Abdominal (lower belly) or stomach pain.  Second (plus) baby: Contractions (tightening) less than 10 minutes apart and getting stronger.  *If less than 34 weeks: Contractions (tightenings) more than 6 times in one hour.  Increase or change in vaginal discharge (note the color and amount)  Other: Tylenol for pain     Follow-up:  As scheduled in the clinic

## 2017-08-02 NOTE — PROGRESS NOTES
No Charge ER Triage:     Patient fell onto left side Monday night in her bathroom.  Patient is 21 weeks gestation and has not felt baby move today. She is desiring fetal monitoring.       /70  Pulse 94  Temp 98.3  F (36.8  C) (Oral)  Wt 172 lb 8 oz (78.2 kg)  LMP 02/20/2017 (Approximate)  SpO2 99%  BMI 30.56 kg/m2      PLAN:     Advised she can call OB MD on way to [facility] to see if they are able to coordinate evaluation.  Otherwise, she should report to [facility] ER for further evaluation. , who is with her today, will drive her there. Patient verbalizes understanding of and agrees to the above plan.

## 2017-08-02 NOTE — IP AVS SNAPSHOT
[facility] Delivery    [address]    [facility] [address]    Phone:  [phone]    Fax:  [phone]                                       After Visit Summary   8/2/2017    Rosette Aguilar    MRN: [id number]           After Visit Summary Signature Page     I have received my discharge instructions, and my questions have been answered. I have discussed any challenges I see with this plan with the nurse or doctor.    ..........................................................................................................................................  Patient/Patient Representative Signature      ..........................................................................................................................................  Patient Representative Print Name and Relationship to Patient    ..................................................               ................................................  Date                                            Time    ..........................................................................................................................................  Reviewed by Signature/Title    ...................................................              ..............................................  Date                                                            Time

## 2017-08-02 NOTE — PLAN OF CARE
Data: [facility] at 1800  Reason for maternal/fetal assessment per patient is Decreased Fetal Movement  . Patient is a . Prenatal record reviewed.      Obstetric History       T2      L2     SAB0   TAB0   Ectopic0   Multiple0   Live Births2       # Outcome Date GA Lbr Jose/2nd Weight Sex Delivery Anes PTL Lv   3 Current            2 Term 07 41w0d  3.856 kg (8 lb 8 oz) M  None N EVENS      Name: Eddie   1 Term 04 40w0d  2.948 kg (6 lb 8 oz) M IVD EPI N EVENS      Name: Ron      Complications: GDM (gestational diabetes mellitus)         Medical History: History reviewed. No pertinent past medical history.. Gestational Age 21w5d. VSS. Cervix: not assessed  Patient denies cramping, backache, vaginal discharge, pelvic pressure, UTI symptoms, GI problems, bloody show, vaginal bleeding, edema, headache, visual disturbances, epigastric or URQ pain, abdominal pain, rupture of membranes. Support persons her  Jaydon and her 10 year old son are present.  Action: Doptones of 's, abdomen palpated no tenderness, no contractions,  Triage assessment completed. Does c/o left lower back and hip pain from fall on Monday isela   Response: Dr. Cooley informed of patient's admission Plan per provider is to give her Tylenol and discharge, all printed out discharge papers orally reviewed Patient verbalized understanding of education and verbalized agreement with plan. Discharged ambulatory at 1840.

## 2017-08-02 NOTE — NURSING NOTE
"Chief Complaint   Patient presents with     Urgent Care     Fall     x2d fell on tail bone still in pain. Has not felt baby move since this morning. 21 weeks pregnant        Initial /70  Pulse 94  Temp 98.3  F (36.8  C) (Oral)  Wt 172 lb 8 oz (78.2 kg)  LMP 02/20/2017 (Approximate)  SpO2 99%  BMI 30.56 kg/m2 Estimated body mass index is 30.56 kg/(m^2) as calculated from the following:    Height as of 4/18/17: 5' 3\" (1.6 m).    Weight as of this encounter: 172 lb 8 oz (78.2 kg).  Medication Reconciliation: complete   Porter Ramey, GEORGES   "

## 2017-08-02 NOTE — MR AVS SNAPSHOT
"              After Visit Summary   8/2/2017    Rosette Aguilar    MRN: [id number]           Patient Information     Date Of Birth          1985        Visit Information        Provider Department      8/2/2017 5:05 PM Long Mcgill PA-C [facility]        Today's Diagnoses     DIAGNOSIS NOT YET DEFINED    -  1       Follow-ups after your visit        Your next 10 appointments already scheduled     Aug 08, 2017  4:30 PM CDT   ESTABLISHED PRENATAL with Sebastian Sanford MD   [facility] ([facility]    [address] [place] [address]  [address]  [facility] [address] [phone]            Sep 08, 2017  4:00 PM CDT   ESTABLISHED PRENATAL with Allyson Church MD   [facility] ([facility])    [address] [place] [address]  [address]  [facility] [address] [phone]              Who to contact     If you have questions or need follow up information about today's clinic visit or your schedule please contact [facility] directly at [phone].  Normal or non-critical lab and imaging results will be communicated to you by [email or web address], letter or phone within 4 business days after the clinic has received the results. If you do not hear from us within 7 days, please contact the clinic through [email or web address] or phone. If you have a critical or abnormal lab result, we will notify you by phone as soon as possible.  Submit refill requests through basno or call your pharmacy and they will forward the refill request to us. Please allow 3 business days for your refill to be completed.          Additional Information About Your Visit        [email or web address] Information     basno lets you send messages to your doctor, view your test results, renew your prescriptions, schedule appointments and more. To sign up, go to [email or web address] . Click on \"Log in\" on the left side of the screen, which will take you to the Welcome page. Then " "click on \"Sign up Now\" on the right side of the page.     You will be asked to enter the access code listed below, as well as some personal information. Please follow the directions to create your username and password.     Your access code is: [email or web address]  Expires: 10/12/2017  5:00 PM     Your access code will  in 90 days. If you need help or a new code, please call your [place] clinic or [phone].        Care EveryWhere ID     This is your Care EveryWhere ID. This could be used by other organizations to access your [place] medical records  [id number]        Your Vitals Were     Pulse Temperature Last Period Pulse Oximetry BMI (Body Mass Index)       94 98.3  F (36.8  C) (Oral) 2017 (Approximate) 99% 30.56 kg/m2        Blood Pressure from Last 3 Encounters:   17 110/70   17 104/70   17 106/70    Weight from Last 3 Encounters:   17 172 lb 8 oz (78.2 kg)   17 173 lb (78.5 kg)   17 164 lb 8 oz (74.6 kg)              Today, you had the following     No orders found for display       Primary Care Provider Office Phone # Fax #    Natali BRIAN [facility] [phone]       [facility] [place] [address] [address] [facility]  [place] [address]        Equal Access to Services     [facility] Hospital[facility]TE : Hadii aad ku hadasho Soomaali, waaxda luqadaha, qaybta kaalmada adeegyada, caridad shoemaker . So [facility] [phone].    ATENCIÓN: Si habla español, tiene a collins disposición servicios gratuitos de asistencia lingüística. Llame al [phone].    We comply with applicable federal civil rights laws and [place] laws. We do not discriminate on the basis of race, color, national origin, age, disability sex, sexual orientation or gender identity.            Thank you!     Thank you for choosing [facility]  for your care. Our goal is always to provide you with excellent care. Hearing back from our patients is one way we can continue to " improve our services. Please take a few minutes to complete the written survey that you may receive in the mail after your visit with us. Thank you!             Your Updated Medication List - Protect others around you: Learn how to safely use, store and throw away your medicines at www.disposemymeds.org.          This list is accurate as of: 8/2/17  5:30 PM.  Always use your most recent med list.                   Brand Name Dispense Instructions for use Diagnosis    prenatal multivitamin  plus iron 27-0.8 MG Tabs per tablet     100 tablet    Take 1 tablet by mouth daily    Pregnancy test positive

## 2017-08-02 NOTE — IP AVS SNAPSHOT
MRN:[id number]                      After Visit Summary   8/2/2017    Rosette Aguilar    MRN: [id number]           Thank you!     Thank you for choosing [facility] for your care. Our goal is always to provide you with excellent care. Hearing back from our patients is one way we can continue to improve our services. Please take a few minutes to complete the written survey that you may receive in the mail after you visit. If you would like to speak to someone directly about your visit please contact Patient Relations at [phone]. Thank you!          Patient Information     Date Of Birth          1985        About your hospital stay     You were admitted on:  August 2, 2017 You last received care in the:  [facility]    You were discharged on:  August 2, 2017       Who to Call     For medical emergencies, please call 911.  For non-urgent questions about your medical care, please call your primary care provider or clinic, [phone]          Attending Provider     Provider Specialty    Tresa Cooley DO OB/Gyn       Primary Care Provider Office Phone # Fax #    Natali BRIAN [facility] [phone]      Your next 10 appointments already scheduled     Aug 08, 2017  4:30 PM CDT   ESTABLISHED PRENATAL with Sebastian Sanford MD   [facility]    [address] [place] [address]  [address]  [facility] [address] [phone]            Sep 08, 2017  4:00 PM CDT   ESTABLISHED PRENATAL with Allyson Church MD   [facility]    [address] [place] [address]  [address]  [facility] [address] [phone]              Further instructions from your care team       Discharge Instruction for Undelivered Patients      You were seen for: Decreased fetal movement after a fall  We Consulted: Dr Cooley  You had (Test or Medicine):Tylenol and hot pack for pain, checked  "for fetal heart beat.     Diet:   Drink 8 to 12 glasses of liquids (milk, juice, water) every day.  You may eat meals and snacks.     Activity:  Call your doctor or nurse midwife if your baby is moving less than usual.     Call your provider if you notice:  Signs of bladder infection: pain when you urinate (use the toilet), need to go more often and more urgently.  The bag of manrique (rupture of membranes) breaks, or you notice leaking in your underwear.  Bright red blood in your underwear.  Abdominal (lower belly) or stomach pain.  Second (plus) baby: Contractions (tightening) less than 10 minutes apart and getting stronger.  *If less than 34 weeks: Contractions (tightenings) more than 6 times in one hour.  Increase or change in vaginal discharge (note the color and amount)  Other: Tylenol for pain     Follow-up:  As scheduled in the clinic          Pending Results     No orders found from 7/31/2017 to 8/3/2017.            Admission Information     Date & Time Provider Department Dept. Phone    8/2/2017 Tresa Cooley, [facility] Labor [facility] Delivery [phone]      Your Vitals Were     Blood Pressure Pulse Temperature Respirations Height Weight    117/75 83 99  F (37.2  C) (Oral) 16 1.651 m (5' 5\") 78 kg (172 lb)    Last Period BMI (Body Mass Index)                02/20/2017 (Approximate) 28.62 kg/m2          MyChart Information     Actionst lets you send messages to your doctor, view your test results, renew your prescriptions, schedule appointments and more. To sign up, go to [email or web address] . Click on \"Log in\" on the left side of the screen, which will take you to the Welcome page. Then click on \"Sign up Now\" on the right side of the page.     You will be asked to enter the access code listed below, as well as some personal information. Please follow the directions to create your username and password.     Your access code is: [email or web address]  Expires: 10/12/2017  5:00 PM     Your access code " will  in 90 days. If you need help or a new code, please call your [place] clinic or [phone].        Care EveryWhere ID     This is your Care EveryWhere ID. This could be used by other organizations to access your [place] medical records  [id number]        Equal Access to Services     LAZARO GOMEZ : Vinod milan Sokarriali, waaxda luqadaha, qaybta kaalmada adeegyada, caridad lottmariahsheryl mcwilliams. So [facility] [phone].    ATENCIÓN: Si habla español, tiene a collins disposición servicios gratuitos de asistencia lingüística. Jayne al [phone].    We comply with applicable federal civil rights laws and [place] laws. We do not discriminate on the basis of race, color, national origin, age, disability sex, sexual orientation or gender identity.               Review of your medicines      UNREVIEWED medicines. Ask your doctor about these medicines        Dose / Directions    prenatal multivitamin  plus iron 27-0.8 MG Tabs per tablet   Used for:  Pregnancy test positive        Dose:  1 tablet   Take 1 tablet by mouth daily   Quantity:  100 tablet   Refills:  3                Protect others around you: Learn how to safely use, store and throw away your medicines at www.disposemymeds.org.             Medication List: This is a list of all your medications and when to take them. Check marks below indicate your daily home schedule. Keep this list as a reference.      Medications           Morning Afternoon Evening Bedtime As Needed    prenatal multivitamin  plus iron 27-0.8 MG Tabs per tablet   Take 1 tablet by mouth daily

## 2017-08-08 ENCOUNTER — PRENATAL OFFICE VISIT (OUTPATIENT)
Dept: OBGYN | Facility: CLINIC | Age: 32
End: 2017-08-08
Payer: COMMERCIAL

## 2017-08-08 ENCOUNTER — NURSE TRIAGE (OUTPATIENT)
Dept: NURSING | Facility: CLINIC | Age: 32
End: 2017-08-08

## 2017-08-08 VITALS
BODY MASS INDEX: 29.29 KG/M2 | DIASTOLIC BLOOD PRESSURE: 70 MMHG | WEIGHT: 176 LBS | HEART RATE: 84 BPM | SYSTOLIC BLOOD PRESSURE: 110 MMHG

## 2017-08-08 DIAGNOSIS — O44.40 LOW LYING PLACENTA, ANTEPARTUM: ICD-10-CM

## 2017-08-08 DIAGNOSIS — Z34.82 ENCOUNTER FOR SUPERVISION OF OTHER NORMAL PREGNANCY IN SECOND TRIMESTER: Primary | ICD-10-CM

## 2017-08-08 PROCEDURE — 99207 ZZC PRENATAL VISIT: CPT | Performed by: OBSTETRICS & GYNECOLOGY

## 2017-08-08 NOTE — MR AVS SNAPSHOT
After Visit Summary   8/8/2017    Rosette Aguilar    MRN: [id number]           Patient Information     Date Of Birth          1985        Visit Information        Provider Department      8/8/2017 4:30 PM Sebastian Sanford MD [facility]        Today's Diagnoses     Encounter for supervision of other normal pregnancy in second trimester    -  1    Low lying placenta, antepartum           Follow-ups after your visit        Follow-up notes from your care team     Return in about 4 weeks (around 9/5/2017).      Your next 10 appointments already scheduled     Aug 22, 2017 11:15 AM CDT   US OB SINGLE FOLLOW UP REPEAT with RIUS1   [facility] ([facility])    [address]  [address]  [facility] [address] [phone]           Please bring a list of your medicines (including vitamins, minerals and over-the-counter drugs). Also, tell your doctor about any allergies you may have. Wear comfortable clothes and leave your valuables at home.  If you re less than 20 weeks drink four 8-ounce glasses of fluid an hour before your exam. If you need to empty your bladder before your exam, try to release only a little urine. Then, drink another glass of fluid.  You may have up to two family members in the exam room. If you bring a small child, an adult must be there to care for him or her.  Please call the Imaging Department at your exam site with any questions.            Sep 08, 2017  4:00 PM CDT   ESTABLISHED PRENATAL with Allyson Church MD   [facility]    [address] [place] [address]  [address]  [facility] [address] [phone]              Future tests that were ordered for you today     Open Future Orders        Priority Expected Expires Ordered    US OB Ltd One Or More Fetus FU/Repeat Routine  8/7/2018 8/7/2017            Who to contact     If you have questions or need follow up information about  "today's clinic visit or your schedule please contact [facility] directly at [phone].  Normal or non-critical lab and imaging results will be communicated to you by MyChart, letter or phone within 4 business days after the clinic has received the results. If you do not hear from us within 7 days, please contact the clinic through MyChart or phone. If you have a critical or abnormal lab result, we will notify you by phone as soon as possible.  Submit refill requests through innRoad or call your pharmacy and they will forward the refill request to us. Please allow 3 business days for your refill to be completed.          Additional Information About Your Visit        MyChart Information     innRoad lets you send messages to your doctor, view your test results, renew your prescriptions, schedule appointments and more. To sign up, go to [email or web address] . Click on \"Log in\" on the left side of the screen, which will take you to the Welcome page. Then click on \"Sign up Now\" on the right side of the page.     You will be asked to enter the access code listed below, as well as some personal information. Please follow the directions to create your username and password.     Your access code is: [email or web address]  Expires: 10/12/2017  5:00 PM     Your access code will  in 90 days. If you need help or a new code, please call your [place] clinic or [phone].        Care EveryWhere ID     This is your Care EveryWhere ID. This could be used by other organizations to access your [place] medical records  [id number]        Your Vitals Were     Pulse Last Period BMI (Body Mass Index)             84 2017 (Approximate) 29.29 kg/m2          Blood Pressure from Last 3 Encounters:   17 110/70   17 117/75   17 110/70    Weight from Last 3 Encounters:   17 176 lb (79.8 kg)   17 172 lb (78 kg)   17 172 lb 8 oz (78.2 kg)               Primary Care Provider Office Phone " # Fax #    Natali Izquierdo, APRN [facility] [phone]       [address]  [facility] [address]        Equal Access to Services     LAZARO GOMEZ : Hadii duane ku hadcarmeno Soomaali, waaxda luqadaha, qaybta kaalmada adeegyada, caridad dyen myratin carolina laCarliedeandra mcwilliams. So [facility] [phone].    ATENCIÓN: Si habla español, tiene a collins disposición servicios gratuitos de asistencia lingüística. Llame al [phone].    We comply with applicable federal civil rights laws and [place] laws. We do not discriminate on the basis of race, color, national origin, age, disability sex, sexual orientation or gender identity.            Thank you!     Thank you for choosing [facility]  for your care. Our goal is always to provide you with excellent care. Hearing back from our patients is one way we can continue to improve our services. Please take a few minutes to complete the written survey that you may receive in the mail after your visit with us. Thank you!             Your Updated Medication List - Protect others around you: Learn how to safely use, store and throw away your medicines at www.disposemymeds.org.          This list is accurate as of: 8/8/17 11:59 PM.  Always use your most recent med list.                   Brand Name Dispense Instructions for use Diagnosis    prenatal multivitamin plus iron 27-0.8 MG Tabs per tablet     100 tablet    Take 1 tablet by mouth daily    Pregnancy test positive

## 2017-08-08 NOTE — TELEPHONE ENCOUNTER
is calling and states that clinic phoned Rosette, but Rosette could not hear message on her phone.   calling to verify appointment today.  No triage.

## 2017-08-10 NOTE — PROGRESS NOTES
IUP at 22 4/7 weeks     No vaginal bleeding     Low lying placenta; recommend pelvic rest       -repeat ultrasound in 4 weeks

## 2017-08-22 ENCOUNTER — RADIANT APPOINTMENT (OUTPATIENT)
Dept: ULTRASOUND IMAGING | Facility: CLINIC | Age: 32
End: 2017-08-22
Attending: OBSTETRICS & GYNECOLOGY
Payer: COMMERCIAL

## 2017-08-22 DIAGNOSIS — O44.40 LOW LYING PLACENTA, ANTEPARTUM: ICD-10-CM

## 2017-08-22 PROCEDURE — 76815 OB US LIMITED FETUS(S): CPT | Performed by: OBSTETRICS & GYNECOLOGY

## 2017-08-24 ENCOUNTER — MYC MEDICAL ADVICE (OUTPATIENT)
Dept: OBGYN | Facility: CLINIC | Age: 32
End: 2017-08-24

## 2017-08-25 NOTE — TELEPHONE ENCOUNTER
Please advise on recent u/s results.    Daphnie Garcia R.N.  [facility]

## 2017-09-08 ENCOUNTER — PRENATAL OFFICE VISIT (OUTPATIENT)
Dept: OBGYN | Facility: CLINIC | Age: 32
End: 2017-09-08
Payer: COMMERCIAL

## 2017-09-08 VITALS
HEIGHT: 65 IN | WEIGHT: 179.1 LBS | SYSTOLIC BLOOD PRESSURE: 112 MMHG | BODY MASS INDEX: 29.84 KG/M2 | HEART RATE: 92 BPM | DIASTOLIC BLOOD PRESSURE: 72 MMHG

## 2017-09-08 DIAGNOSIS — Z34.82 ENCOUNTER FOR SUPERVISION OF OTHER NORMAL PREGNANCY IN SECOND TRIMESTER: Primary | ICD-10-CM

## 2017-09-08 DIAGNOSIS — O44.40 LOW LYING PLACENTA, ANTEPARTUM: ICD-10-CM

## 2017-09-08 PROCEDURE — 99207 ZZC PRENATAL VISIT: CPT | Performed by: OBSTETRICS & GYNECOLOGY

## 2017-09-08 NOTE — PATIENT INSTRUCTIONS
Call the clinic ([place] [phone], [place] [phone]) right away with any of the following concerns:    1.   Regular tightening or contractions every 10 minutes, that do not go away with rest and hydration    2.   Vaginal bleeding.      Continue your prenatal vitamins daily.    Avoid lying flat on your back for a prolonged period.    Please call with any additional concerns that your have, and continue your prenatal visits every four weeks!

## 2017-09-08 NOTE — NURSING NOTE
"Chief Complaint   Patient presents with     Prenatal Care     27+0       Initial /72  Pulse 92  Ht 5' 5\" (1.651 m)  Wt 179 lb 1.6 oz (81.2 kg)  LMP 2017 (Approximate)  BMI 29.8 kg/m2 Estimated body mass index is 29.8 kg/(m^2) as calculated from the following:    Height as of this encounter: 5' 5\" (1.651 m).    Weight as of this encounter: 179 lb 1.6 oz (81.2 kg).  BP completed using cuff size: regular        The following HM Due: NONE      The following patient reported/Care Every where data was sent to:  P ABSTRACT QUALITY INITIATIVES [90955]  NA     patient has appointment for today    Heather QUARLES               "

## 2017-09-08 NOTE — PROGRESS NOTES
CC: Rosette Aguilar is a 32 year old who presents for routine prenatal visit @ 27w0d       HPI:  Doing well, denies bleeding, cramping.  Patient reports some suprapubic pain which occurs 2-3 times per week and lasts 4-5 minutes at a time.  She also notes pelvic pressure which resolves when she rests.  Patient inquires about potential for resolution of low lying placenta.       This document serves as a record of the services and decisions personally performed and made by Allyson Church MD. It was created on her behalf by Yuridia Wasserman, a trained medical scribe. The creation of this document is based the provider's statements to the medical scribe.  Yuridia Wasserman 2017 4:27 PM      Exam:   See OB flowsheet    ASSESSMENT/PLAN  Rosette Aguilar is a 32 year old   @ 27w0d     1)  Return to clinic in 4 weeks  2)  Reviewed 17 ultrasound- low lying placenta.  Follow-up ultrasound in 3 weeks recommended.   3) Patient will do repeat GCT next Friday, 9/15/17       The information in this document, created by the medical scribe for me, accurately reflects the services I personally performed and the decisions made by me. I have reviewed and approved this document for accuracy prior to leaving the patient care area.  2017 4:27 PM      Allyson Church M.D.

## 2017-09-11 ENCOUNTER — TELEPHONE (OUTPATIENT)
Dept: OBGYN | Facility: CLINIC | Age: 32
End: 2017-09-11

## 2017-09-11 NOTE — TELEPHONE ENCOUNTER
Pt advised and nurse only appointment made for same day (2:30) although pt will check in for both and ask u/s tech if she can start the glucose at time of u/s.  Holley Mantilla CMA

## 2017-09-11 NOTE — TELEPHONE ENCOUNTER
----- Message from Allyson Church MD sent at 9/8/2017  4:30 PM CDT -----  Rosette will have an ultrasound appointment next Friday at [facility], and will need to do her 28 wks labs as well.        Can we let one of the staff know, so they can help set her up for the glucose test?        Thank you.    Dr. Church

## 2017-09-15 ENCOUNTER — ALLIED HEALTH/NURSE VISIT (OUTPATIENT)
Dept: NURSING | Facility: CLINIC | Age: 32
End: 2017-09-15
Payer: COMMERCIAL

## 2017-09-15 ENCOUNTER — RADIANT APPOINTMENT (OUTPATIENT)
Dept: ULTRASOUND IMAGING | Facility: CLINIC | Age: 32
End: 2017-09-15
Attending: OBSTETRICS & GYNECOLOGY
Payer: COMMERCIAL

## 2017-09-15 DIAGNOSIS — O44.40 LOW LYING PLACENTA, ANTEPARTUM: ICD-10-CM

## 2017-09-15 DIAGNOSIS — Z34.82 ENCOUNTER FOR SUPERVISION OF OTHER NORMAL PREGNANCY IN SECOND TRIMESTER: Primary | ICD-10-CM

## 2017-09-15 LAB
ERYTHROCYTE [DISTWIDTH] IN BLOOD BY AUTOMATED COUNT: 14.9 % (ref 10–15)
HCT VFR BLD AUTO: 32 % (ref 35–47)
HGB BLD-MCNC: 10 G/DL (ref 11.7–15.7)
MCH RBC QN AUTO: 29.8 PG (ref 26.5–33)
MCHC RBC AUTO-ENTMCNC: 31.3 G/DL (ref 31.5–36.5)
MCV RBC AUTO: 95 FL (ref 78–100)
PLATELET # BLD AUTO: 274 10E9/L (ref 150–450)
RBC # BLD AUTO: 3.36 10E12/L (ref 3.8–5.2)
WBC # BLD AUTO: 11 10E9/L (ref 4–11)

## 2017-09-15 PROCEDURE — 36415 COLL VENOUS BLD VENIPUNCTURE: CPT | Performed by: OBSTETRICS & GYNECOLOGY

## 2017-09-15 PROCEDURE — 90715 TDAP VACCINE 7 YRS/> IM: CPT

## 2017-09-15 PROCEDURE — 85027 COMPLETE CBC AUTOMATED: CPT | Performed by: OBSTETRICS & GYNECOLOGY

## 2017-09-15 PROCEDURE — 86780 TREPONEMA PALLIDUM: CPT | Performed by: OBSTETRICS & GYNECOLOGY

## 2017-09-15 PROCEDURE — 76816 OB US FOLLOW-UP PER FETUS: CPT | Performed by: OBSTETRICS & GYNECOLOGY

## 2017-09-15 PROCEDURE — 90471 IMMUNIZATION ADMIN: CPT

## 2017-09-15 PROCEDURE — 99207 ZZC NO CHARGE NURSE ONLY: CPT

## 2017-09-15 PROCEDURE — 82950 GLUCOSE TEST: CPT | Performed by: OBSTETRICS & GYNECOLOGY

## 2017-09-16 LAB
GLUCOSE 1H P 50 G GLC PO SERPL-MCNC: 124 MG/DL (ref 60–129)
T PALLIDUM IGG+IGM SER QL: NEGATIVE

## 2017-09-21 ENCOUNTER — PRENATAL OFFICE VISIT (OUTPATIENT)
Dept: OBGYN | Facility: CLINIC | Age: 32
End: 2017-09-21
Payer: COMMERCIAL

## 2017-09-21 VITALS
WEIGHT: 180 LBS | BODY MASS INDEX: 29.95 KG/M2 | HEART RATE: 84 BPM | DIASTOLIC BLOOD PRESSURE: 66 MMHG | SYSTOLIC BLOOD PRESSURE: 108 MMHG

## 2017-09-21 DIAGNOSIS — O44.40 LOW LYING PLACENTA, ANTEPARTUM: ICD-10-CM

## 2017-09-21 DIAGNOSIS — D50.8 IRON DEFICIENCY ANEMIA SECONDARY TO INADEQUATE DIETARY IRON INTAKE: ICD-10-CM

## 2017-09-21 DIAGNOSIS — Z34.83 ENCOUNTER FOR SUPERVISION OF OTHER NORMAL PREGNANCY IN THIRD TRIMESTER: Primary | ICD-10-CM

## 2017-09-21 PROCEDURE — 99207 ZZC PRENATAL VISIT: CPT | Performed by: OBSTETRICS & GYNECOLOGY

## 2017-09-21 RX ORDER — FERROUS SULFATE 325(65) MG
1 TABLET ORAL
Qty: 90 TABLET | Refills: 3 | Status: SHIPPED | OUTPATIENT
Start: 2017-09-21 | End: 2019-07-10

## 2017-09-21 NOTE — MR AVS SNAPSHOT
After Visit Summary   9/21/2017    Rosette Aguilar    MRN: [id number]           Patient Information     Date Of Birth          1985        Visit Information        Provider Department      9/21/2017 2:45 PM Allyson Church MD [facility]        Today's Diagnoses     Encounter for supervision of other normal pregnancy in third trimester    -  1    Iron deficiency anemia secondary to inadequate dietary iron intake        Low lying placenta, antepartum          Care Instructions    Call the clinic ([facility] [phone], [place] [phone]) right away with any of the following concerns:    1.   Regular tightening, cramping or contractions every 10 minutes, that do not go away with rest and hydration    2.   Vaginal bleeding.    3.   Leaking fluid of any amount, or suspicion of ruptured membranes    4.   Decreased movement of your baby.      Continue your prenatal vitamins daily.    Please call with any additional concerns that your have, and continue your prenatal visits every two weeks!          Follow-ups after your visit        Your next 10 appointments already scheduled     Oct 05, 2017  3:45 PM CDT   ESTABLISHED PRENATAL with Allyson Church MD   [facility] ([facility]    [address] [place] [address]  [address]  [facility] [address]   [phone]            Oct 18, 2017  3:30 PM CDT   ESTABLISHED PRENATAL with Sebastian Sanford MD   [facility] ([facility]    [address] [place] [address]  [address]  [facility] [address]   [phone]            Nov 07, 2017  3:45 PM CST   ESTABLISHED PRENATAL with Sebastian Sanford MD   [facility] ([facility]    [address] [place] [address]  [address]  [facility] [address]   [phone]            Nov 16, 2017  3:30 PM CST   ESTABLISHED PRENATAL with Allyson Church MD   [facility] ([facility]    [address] [place] [address]  Drive  [address]  [place] [address]   [phone]            Nov 24, 2017  3:30 PM CST   ESTABLISHED PRENATAL with Sebastian Sanford MD   [facility] ([facility])    [address]  [facility] [id number] [phone]              Future tests that were ordered for you today     Open Future Orders        Priority Expected Expires Ordered    US OB Single Follow Up Repeat Routine  12/20/2017 9/21/2017            Who to contact     If you have questions or need follow up information about today's clinic visit or your schedule please contact [facility] directly at [phone].  Normal or non-critical lab and imaging results will be communicated to you by MyChart, letter or phone within 4 business days after the clinic has received the results. If you do not hear from us within 7 days, please contact the clinic through [employer] or phone. If you have a critical or abnormal lab result, we will notify you by phone as soon as possible.  Submit refill requests through [employer] or call your pharmacy and they will forward the refill request to us. Please allow 3 business days for your refill to be completed.          Additional Information About Your Visit        [employer] Information     [employer] gives you secure access to your electronic health record. If you see a primary care provider, you can also send messages to your care team and make appointments. If you have questions, please call your primary care clinic.  If you do not have a primary care provider, please call [phone] and they will assist you.        Care EveryWhere ID     This is your Care EveryWhere ID. This could be used by other organizations to access your [facility] medical records  [id number]        Your Vitals Were     Pulse Last Period BMI (Body Mass Index)             84 02/20/2017 (Approximate) 29.95 kg/m2          Blood Pressure from Last 3 Encounters:   09/21/17 108/66   09/08/17 112/72    08/08/17 110/70    Weight from Last 3 Encounters:   09/21/17 180 lb (81.6 kg)   09/08/17 179 lb 1.6 oz (81.2 kg)   08/08/17 176 lb (79.8 kg)                 Today's Medication Changes          These changes are accurate as of: 9/21/17 11:59 PM.  If you have any questions, ask your nurse or doctor.               Start taking these medicines.        Dose/Directions    ferrous sulfate 325 (65 FE) MG tablet   Commonly known as:  IRON   Used for:  Encounter for supervision of other normal pregnancy in third trimester, Iron deficiency anemia secondary to inadequate dietary iron intake   Started by:  Allyson Church MD        Dose:  1 tablet   Take 1 tablet (325 mg) by mouth daily (with breakfast)   Quantity:  90 tablet   Refills:  3            Where to get your medicines      These medications were sent to [id number] - [place], [place] - [address]  [id number], [facility] [address]     Phone:  [phone]     ferrous sulfate 325 (65 FE) MG tablet                Primary Care Provider Office Phone # Fax #    Natali Chuck Izquierdo, BRIAN [facility] [phone] [address] [facility] [address]        Equal Access to Services     LAZARO GOMEZ AH: Hadii duane godinez hadasho Soomaali, waaxda luqadaha, qaybta kaalmada adeegyada, caridad mcwilliams. So [facility] [phone].    ATENCIÓN: Si habla español, tiene a collins disposición servicios gratuitos de asistencia lingüística. Llame al [phone].    We comply with applicable federal civil rights laws and [place] laws. We do not discriminate on the basis of race, color, national origin, age, disability sex, sexual orientation or gender identity.            Thank you!     Thank you for choosing [facility]  for your care. Our goal is always to provide you with excellent care. Hearing back from our patients is one way we can continue to improve our services. Please take a few minutes to complete the written  survey that you may receive in the mail after your visit with us. Thank you!             Your Updated Medication List - Protect others around you: Learn how to safely use, store and throw away your medicines at www.disposemymeds.org.          This list is accurate as of: 9/21/17 11:59 PM.  Always use your most recent med list.                   Brand Name Dispense Instructions for use Diagnosis    ferrous sulfate 325 (65 FE) MG tablet    IRON    90 tablet    Take 1 tablet (325 mg) by mouth daily (with breakfast)    Encounter for supervision of other normal pregnancy in third trimester, Iron deficiency anemia secondary to inadequate dietary iron intake       prenatal multivitamin plus iron 27-0.8 MG Tabs per tablet     100 tablet    Take 1 tablet by mouth daily    Pregnancy test positive

## 2017-09-21 NOTE — NURSING NOTE
"Chief Complaint   Patient presents with     Prenatal Care   28w6d  Declines flu shot today  Discuss u/s     Initial /66 (BP Location: Right arm, Patient Position: Sitting, Cuff Size: Adult Regular)  Pulse 84  Wt 180 lb (81.6 kg)  LMP 02/20/2017 (Approximate)  BMI 29.95 kg/m2 Estimated body mass index is 29.95 kg/(m^2) as calculated from the following:    Height as of 9/8/17: 5' 5\" (1.651 m).    Weight as of this encounter: 180 lb (81.6 kg).  Medication Reconciliation: complete    "

## 2017-09-21 NOTE — PROGRESS NOTES
CC: Rosette Aguilar is a 32 year old who presents for routine prenatal visit @ 28w6d       HPI:  This patient is accompanied in the office by her .  Denies vaginal bleeding, regular contractions, loss of fluid; +fetal movement reported.    This document serves as a record of the services and decisions personally performed and made by Allyson Church MD. It was created on her behalf by Yuridia Wasserman, a trained medical scribe. The creation of this document is based the provider's statements to the medical scribe.  Yuridia Wasserman 2017 3:28 PM     Exam:   See OB flowsheet    ASSESSMENT/PLAN  Rosette Aguilar is a 32 year old   @ 28w6d     1)  Return to clinic in 2 weeks  2)  GCT normal, low hgb; ordered iron supplements.  3)  Blood pressure and weight gain reviewed with patient   4)  Reviewed 9/15/17 ultrasound with patient- EFW 65th %ile, normal SHERICE; persistent posterior low-lying placenta noted.  Repeat ultrasound ordered for 3-4 wks from last scan.   5)  Bedside TAUS today showed placenta 2.5 cm from cervix.       The information in this document, created by the medical scribe for me, accurately reflects the services I personally performed and the decisions made by me. I have reviewed and approved this document for accuracy prior to leaving the patient care area.  2017 3:28 PM      Allyson Church M.D.

## 2017-09-21 NOTE — PATIENT INSTRUCTIONS
Call the clinic ([facility] [phone], [place] [phone]) right away with any of the following concerns:    1.   Regular tightening, cramping or contractions every 10 minutes, that do not go away with rest and hydration    2.   Vaginal bleeding.    3.   Leaking fluid of any amount, or suspicion of ruptured membranes    4.   Decreased movement of your baby.      Continue your prenatal vitamins daily.    Please call with any additional concerns that your have, and continue your prenatal visits every two weeks!

## 2017-10-05 ENCOUNTER — PRENATAL OFFICE VISIT (OUTPATIENT)
Dept: OBGYN | Facility: CLINIC | Age: 32
End: 2017-10-05
Payer: COMMERCIAL

## 2017-10-05 VITALS
HEART RATE: 84 BPM | WEIGHT: 181.1 LBS | DIASTOLIC BLOOD PRESSURE: 64 MMHG | BODY MASS INDEX: 30.14 KG/M2 | SYSTOLIC BLOOD PRESSURE: 116 MMHG

## 2017-10-05 DIAGNOSIS — Z34.83 ENCOUNTER FOR SUPERVISION OF OTHER NORMAL PREGNANCY IN THIRD TRIMESTER: Primary | ICD-10-CM

## 2017-10-05 DIAGNOSIS — O44.40 LOW LYING PLACENTA, ANTEPARTUM: ICD-10-CM

## 2017-10-05 PROCEDURE — 99207 ZZC PRENATAL VISIT: CPT | Performed by: OBSTETRICS & GYNECOLOGY

## 2017-10-05 NOTE — PROGRESS NOTES
"CC: Rosette Aguilar is a 32 year old who presents for routine prenatal visit @ 30w6d       HPI:  Patient reports sharp \"poking\" pain at night associated with fetal movement.  She notes this has occurred over the past 3-4 months and lasts 1-2 minutes at a time.  She is not aware of any aggravating factors.  Denies vaginal bleeding, regular contractions, loss of fluid; +fetal movement reported.  Patient inquires about forms for maternity leave.     This document serves as a record of the services and decisions personally performed and made by Allyson Church MD. It was created on her behalf by Yuridia Wasserman, a trained medical scribe. The creation of this document is based the provider's statements to the medical scribe.  Yuridia Wasserman 2017 4:07 PM     Exam:   See OB flowsheet    ASSESSMENT/PLAN  Rosette Aguilar is a 32 year old   @ 30w6d     1)  Return to clinic in 2 weeks  2)  Pre registration completed today   3)  Pain likely round ligament stretching   4)  Blood pressure and weight gain reviewed with patient   5)  9/15/17 GCT normal  6)  Suggested patient check with employer regarding maternity leave and benefits     The information in this document, created by the medical scribe for me, accurately reflects the services I personally performed and the decisions made by me. I have reviewed and approved this document for accuracy prior to leaving the patient care area.  10/5/2017 4:07 PM      Allyson Church M.D.    "

## 2017-10-05 NOTE — NURSING NOTE
"Chief Complaint   Patient presents with     Prenatal Care   30w6d  Declines flu  Here with spouse    Initial /64  Pulse 84  Wt 181 lb 1.6 oz (82.1 kg)  LMP 02/20/2017 (Approximate)  BMI 30.14 kg/m2 Estimated body mass index is 30.14 kg/(m^2) as calculated from the following:    Height as of 9/8/17: 5' 5\" (1.651 m).    Weight as of this encounter: 181 lb 1.6 oz (82.1 kg).  Medication Reconciliation: complete    "

## 2017-10-05 NOTE — PATIENT INSTRUCTIONS
Call the clinic ([facility] [phone], [place] [phone]) right away with any of the following concerns:    1.   Regular tightening, cramping or contractions every 10 minutes, that do not go away with rest and hydration    2.   Vaginal bleeding.    3.   Leaking fluid of any amount, or suspicion of ruptured membranes    4.   Decreased movement of your baby.      Continue your prenatal vitamins daily.    Please call with any additional concerns that your have, and continue your prenatal visits every two weeks!

## 2017-10-16 ENCOUNTER — RADIANT APPOINTMENT (OUTPATIENT)
Dept: ULTRASOUND IMAGING | Facility: CLINIC | Age: 32
End: 2017-10-16
Attending: OBSTETRICS & GYNECOLOGY
Payer: COMMERCIAL

## 2017-10-16 DIAGNOSIS — Z34.83 ENCOUNTER FOR SUPERVISION OF OTHER NORMAL PREGNANCY IN THIRD TRIMESTER: ICD-10-CM

## 2017-10-16 DIAGNOSIS — O44.40 LOW LYING PLACENTA, ANTEPARTUM: ICD-10-CM

## 2017-10-16 PROCEDURE — 76816 OB US FOLLOW-UP PER FETUS: CPT | Performed by: OBSTETRICS & GYNECOLOGY

## 2017-10-18 ENCOUNTER — PRENATAL OFFICE VISIT (OUTPATIENT)
Dept: OBGYN | Facility: CLINIC | Age: 32
End: 2017-10-18
Payer: COMMERCIAL

## 2017-10-18 VITALS
BODY MASS INDEX: 30.45 KG/M2 | WEIGHT: 183 LBS | HEART RATE: 84 BPM | DIASTOLIC BLOOD PRESSURE: 70 MMHG | SYSTOLIC BLOOD PRESSURE: 104 MMHG

## 2017-10-18 DIAGNOSIS — Z34.83 ENCOUNTER FOR SUPERVISION OF OTHER NORMAL PREGNANCY IN THIRD TRIMESTER: Primary | ICD-10-CM

## 2017-10-18 PROBLEM — O44.40 LOW LYING PLACENTA, ANTEPARTUM: Status: RESOLVED | Noted: 2017-08-08 | Resolved: 2017-10-18

## 2017-10-18 PROCEDURE — 99207 ZZC PRENATAL VISIT: CPT | Performed by: OBSTETRICS & GYNECOLOGY

## 2017-10-18 NOTE — MR AVS SNAPSHOT
After Visit Summary   10/18/2017    Rosette Aguilar    MRN: [id number]           Patient Information     Date Of Birth          1985        Visit Information        Provider Department      10/18/2017 10:45 AM Sebastian Sanford MD [facility]        Today's Diagnoses     Encounter for supervision of other normal pregnancy in third trimester    -  1       Follow-ups after your visit        Follow-up notes from your care team     Return in about 2 weeks (around 11/1/2017).      Your next 10 appointments already scheduled     Nov 08, 2017 10:45 AM CST   ESTABLISHED PRENATAL with Sebastian Sanford MD   [facility] ([facility])    [address] [place] [address]  [address]  [facility] [address]   [phone]            Nov 16, 2017 10:00 AM CST   ESTABLISHED PRENATAL with Allyson Church MD   [facility] ([facility])    [address] [place] [address]  [address]  [facility] [address]   [phone]            Nov 24, 2017 10:15 AM CST   ESTABLISHED PRENATAL with Sebastian Sanford MD   [facility] ([facility])    [facility] [address]  [facility] [address]   [phone]              Who to contact     If you have questions or need follow up information about today's clinic visit or your schedule please contact [facility] directly at [phone].  Normal or non-critical lab and imaging results will be communicated to you by MyChart, letter or phone within 4 business days after the clinic has received the results. If you do not hear from us within 7 days, please contact the clinic through MyChart or phone. If you have a critical or abnormal lab result, we will notify you by phone as soon as possible.  Submit refill requests through [employer] or call your pharmacy and they will forward the refill request to us. Please allow 3 business days for your refill to be completed.          Additional  Information About Your Visit        MyChart Information     [employer] gives you secure access to your electronic health record. If you see a primary care provider, you can also send messages to your care team and make appointments. If you have questions, please call your primary care clinic.  If you do not have a primary care provider, please call [phone] and they will assist you.        Care EveryWhere ID     This is your Care EveryWhere ID. This could be used by other organizations to access your [facility] medical records  [id number]        Your Vitals Were     Pulse Last Period BMI (Body Mass Index)             84 02/20/2017 (Approximate) 30.45 kg/m2          Blood Pressure from Last 3 Encounters:   10/18/17 104/70   10/05/17 116/64   09/21/17 108/66    Weight from Last 3 Encounters:   10/18/17 183 lb (83 kg)   10/05/17 181 lb 1.6 oz (82.1 kg)   09/21/17 180 lb (81.6 kg)              Today, you had the following     No orders found for display       Primary Care Provider Office Phone # Fax #    Natali Chuck Izquierdo, BRIAN [facility] [phone]       [address] [facility] [address]        Equal Access to Services     LAZARO GOMEZ AH: Hadii aad ku hadasho Soomaali, waaxda luqadaha, qaybta kaalmada adeegyada, caridad dyen nallely mcwilliams. So [facility] [phone].    ATENCIÓN: Si habla español, tiene a collins disposición servicios gratuitos de asistencia lingüística. Llame al [phone].    We comply with applicable federal civil rights laws and [place] laws. We do not discriminate on the basis of race, color, national origin, age, disability, sex, sexual orientation, or gender identity.            Thank you!     Thank you for choosing [facility]  for your care. Our goal is always to provide you with excellent care. Hearing back from our patients is one way we can continue to improve our services. Please take a few minutes to complete the written survey that you may receive in the  mail after your visit with us. Thank you!             Your Updated Medication List - Protect others around you: Learn how to safely use, store and throw away your medicines at www.disposemymeds.org.          This list is accurate as of: 10/18/17 11:07 AM.  Always use your most recent med list.                   Brand Name Dispense Instructions for use Diagnosis    ferrous sulfate 325 (65 FE) MG tablet    IRON    90 tablet    Take 1 tablet (325 mg) by mouth daily (with breakfast)    Encounter for supervision of other normal pregnancy in third trimester, Iron deficiency anemia secondary to inadequate dietary iron intake       prenatal multivitamin plus iron 27-0.8 MG Tabs per tablet     100 tablet    Take 1 tablet by mouth daily    Pregnancy test positive

## 2017-10-18 NOTE — PROGRESS NOTES
IUP at 32 5/7 weeks;     -fetal movement present/fetal heart tones at 145  Resolution of low lying placenta

## 2017-10-18 NOTE — NURSING NOTE
"Chief Complaint   Patient presents with     Prenatal Care     baby active and moving     32w5d    Initial /70 (BP Location: Right arm, Patient Position: Chair, Cuff Size: Adult Regular)  Pulse 84  Wt 183 lb (83 kg)  LMP 02/20/2017 (Approximate)  BMI 30.45 kg/m2 Estimated body mass index is 30.45 kg/(m^2) as calculated from the following:    Height as of 9/8/17: 5' 5\" (1.651 m).    Weight as of this encounter: 183 lb (83 kg).  Medication Reconciliation: complete     Nurse assisted visit.  Sharon Lee MA.      "

## 2017-11-08 ENCOUNTER — PRENATAL OFFICE VISIT (OUTPATIENT)
Dept: OBGYN | Facility: CLINIC | Age: 32
End: 2017-11-08
Payer: COMMERCIAL

## 2017-11-08 VITALS
WEIGHT: 185 LBS | HEART RATE: 88 BPM | BODY MASS INDEX: 30.79 KG/M2 | DIASTOLIC BLOOD PRESSURE: 70 MMHG | SYSTOLIC BLOOD PRESSURE: 110 MMHG

## 2017-11-08 DIAGNOSIS — Z34.83 ENCOUNTER FOR SUPERVISION OF OTHER NORMAL PREGNANCY IN THIRD TRIMESTER: Primary | ICD-10-CM

## 2017-11-08 PROCEDURE — 99207 ZZC PRENATAL VISIT: CPT | Performed by: OBSTETRICS & GYNECOLOGY

## 2017-11-08 NOTE — NURSING NOTE
"Chief Complaint   Patient presents with     Prenatal Care     baby active and moving - some contractions at night     35w5d    Initial /70 (BP Location: Right arm, Patient Position: Chair, Cuff Size: Adult Regular)  Pulse 88  Wt 185 lb (83.9 kg)  LMP 02/20/2017 (Approximate)  BMI 30.79 kg/m2 Estimated body mass index is 30.79 kg/(m^2) as calculated from the following:    Height as of 9/8/17: 5' 5\" (1.651 m).    Weight as of this encounter: 185 lb (83.9 kg).  Medication Reconciliation: complete     Nurse assisted visit.  Sharon Lee MA.      "

## 2017-11-08 NOTE — PROGRESS NOTES
IUP at 35 5/7 weeks; declines Group B strep today     -fetal heart tones at 145/positive fetal movement

## 2017-11-08 NOTE — MR AVS SNAPSHOT
After Visit Summary   11/8/2017    Rosette Aguilar    MRN: [id number]           Patient Information     Date Of Birth          1985        Visit Information        Provider Department      11/8/2017 10:45 AM Sebastian Sanford MD [facility]        Today's Diagnoses     Encounter for supervision of other normal pregnancy in third trimester    -  1       Follow-ups after your visit        Follow-up notes from your care team     Return in about 1 week (around 11/15/2017).      Your next 10 appointments already scheduled     Nov 16, 2017 10:00 AM CST   ESTABLISHED PRENATAL with Allyson Church MD   [facility] ([facility])    [address] [facility]  [address]  [facility] [address] [phone]            Nov 24, 2017 10:15 AM CST   ESTABLISHED PRENATAL with Sebastian Sanford MD   [facility] ([facility])    [address]  [facility] [address] [phone]              Who to contact     If you have questions or need follow up information about today's clinic visit or your schedule please contact [facility] directly at [phone].  Normal or non-critical lab and imaging results will be communicated to you by MyChart, letter or phone within 4 business days after the clinic has received the results. If you do not hear from us within 7 days, please contact the clinic through MyChart or phone. If you have a critical or abnormal lab result, we will notify you by phone as soon as possible.  Submit refill requests through [employer] or call your pharmacy and they will forward the refill request to us. Please allow 3 business days for your refill to be completed.          Additional Information About Your Visit        MyChart Information     [employer] gives you secure access to your electronic health record. If you see a primary care provider, you can also send messages to your care team and make  appointments. If you have questions, please call your primary care clinic.  If you do not have a primary care provider, please call [phone] and they will assist you.        Care EveryWhere ID     This is your Care EveryWhere ID. This could be used by other organizations to access your [facility] medical records  [id number]        Your Vitals Were     Pulse Last Period BMI (Body Mass Index)             88 02/20/2017 (Approximate) 30.79 kg/m2          Blood Pressure from Last 3 Encounters:   11/08/17 110/70   10/18/17 104/70   10/05/17 116/64    Weight from Last 3 Encounters:   11/08/17 185 lb (83.9 kg)   10/18/17 183 lb (83 kg)   10/05/17 181 lb 1.6 oz (82.1 kg)              Today, you had the following     No orders found for display       Primary Care Provider Office Phone # Fax #    Natali BRIAN [facility] [phone]       [address] [facility] [address]        Equal Access to Services     LAZARO GOMEZ : Hadii aad ku hadasho Soomaali, waaxda luqadaha, qaybta kaalmada adeegyada, waxay idiin haytanvin nallely khlaura shoemaker . So [facility] [place] [phone].    ATENCIÓN: Si habla español, tiene a collins disposición servicios gratuitos de asistencia lingüística. Llame al [phone].    We comply with applicable federal civil rights laws and [place] laws. We do not discriminate on the basis of race, color, national origin, age, disability, sex, sexual orientation, or gender identity.            Thank you!     Thank you for choosing [facility]  for your care. Our goal is always to provide you with excellent care. Hearing back from our patients is one way we can continue to improve our services. Please take a few minutes to complete the written survey that you may receive in the mail after your visit with us. Thank you!             Your Updated Medication List - Protect others around you: Learn how to safely use, store and throw away your medicines at www.disposemymeds.org.          This  list is accurate as of: 11/8/17 11:19 AM.  Always use your most recent med list.                   Brand Name Dispense Instructions for use Diagnosis    ferrous sulfate 325 (65 FE) MG tablet    IRON    90 tablet    Take 1 tablet (325 mg) by mouth daily (with breakfast)    Encounter for supervision of other normal pregnancy in third trimester, Iron deficiency anemia secondary to inadequate dietary iron intake       prenatal multivitamin plus iron 27-0.8 MG Tabs per tablet     100 tablet    Take 1 tablet by mouth daily    Pregnancy test positive

## 2017-11-09 ENCOUNTER — TELEPHONE (OUTPATIENT)
Dept: OBGYN | Facility: CLINIC | Age: 32
End: 2017-11-09

## 2017-11-16 ENCOUNTER — PRENATAL OFFICE VISIT (OUTPATIENT)
Dept: OBGYN | Facility: CLINIC | Age: 32
End: 2017-11-16
Payer: COMMERCIAL

## 2017-11-16 VITALS
DIASTOLIC BLOOD PRESSURE: 78 MMHG | BODY MASS INDEX: 31.12 KG/M2 | HEART RATE: 78 BPM | SYSTOLIC BLOOD PRESSURE: 128 MMHG | WEIGHT: 187 LBS

## 2017-11-16 DIAGNOSIS — Z34.83 ENCOUNTER FOR SUPERVISION OF OTHER NORMAL PREGNANCY IN THIRD TRIMESTER: Primary | ICD-10-CM

## 2017-11-16 PROCEDURE — 99207 ZZC PRENATAL VISIT: CPT | Performed by: OBSTETRICS & GYNECOLOGY

## 2017-11-16 PROCEDURE — 87653 STREP B DNA AMP PROBE: CPT | Performed by: OBSTETRICS & GYNECOLOGY

## 2017-11-16 NOTE — NURSING NOTE
"Chief Complaint   Patient presents with     Prenatal Care     36 weeks 6 days- concerns of contractions        Initial /78 (BP Location: Right arm, Patient Position: Sitting, Cuff Size: Adult Regular)  Pulse 78  Wt 187 lb (84.8 kg)  LMP 2017 (Approximate)  Breastfeeding? No  BMI 31.12 kg/m2 Estimated body mass index is 31.12 kg/(m^2) as calculated from the following:    Height as of 17: 5' 5\" (1.651 m).    Weight as of this encounter: 187 lb (84.8 kg).  BP completed using cuff size: regular        The following HM Due: NONE  Patient has appointment for today.   36w6d  [place] CMA               "

## 2017-11-16 NOTE — PROGRESS NOTES
CC: Here for routine prenatal visit @ 36w6d       HPI:  denies vaginal bleeding, regular contractions, loss of fluid; +fetal movement reported.      Exam:   See OB flowsheet    ASSESSMENT/PLAN  Rosette Aguilar is a 32 year old   @ 36w6d .    Low lying placenta, resolved.    GBS today.    1)  Return to clinic 1 wks

## 2017-11-16 NOTE — PATIENT INSTRUCTIONS
Call the clinic ([facility] [phone], [place] [phone]) right away with any of the following concerns:    1.   Regular, painful contractions every 5 minutes over approximately one hour's time.    2.   Vaginal bleeding.    3.   Leaking fluid of any amount, or suspicion of ruptured membranes    4.   Decreased movement of your baby.      Continue your prenatal vitamins daily.    Please call with any additional concerns that your have, and continue your prenatal visits weekly.    You may complete your hospital pre-registration online at [email or web address].

## 2017-11-16 NOTE — MR AVS SNAPSHOT
After Visit Summary   11/16/2017    Rosette Aguilar    MRN: [id number]           Patient Information     Date Of Birth          1985        Visit Information        Provider Department      11/16/2017 10:00 AM Allyson [facility] MD [facility]        Today's Diagnoses     Encounter for supervision of other normal pregnancy in third trimester    -  1      Care Instructions    Call the clinic ([facility] [phone], [place] [phone]) right away with any of the following concerns:    1.   Regular, painful contractions every 5 minutes over approximately one hour's time.    2.   Vaginal bleeding.    3.   Leaking fluid of any amount, or suspicion of ruptured membranes    4.   Decreased movement of your baby.      Continue your prenatal vitamins daily.    Please call with any additional concerns that your have, and continue your prenatal visits weekly.    You may complete your hospital pre-registration online at [email or web address].               Follow-ups after your visit        Your next 10 appointments already scheduled     Nov 24, 2017 10:15 AM CST   ESTABLISHED PRENATAL with Sebastian Sanford MD   [facility] ([facility])    [address]  [facility] [id number] [phone]              Who to contact     If you have questions or need follow up information about today's clinic visit or your schedule please contact [facility] directly at [phone].  Normal or non-critical lab and imaging results will be communicated to you by MyChart, letter or phone within 4 business days after the clinic has received the results. If you do not hear from us within 7 days, please contact the clinic through MyChart or phone. If you have a critical or abnormal lab result, we will notify you by phone as soon as possible.  Submit refill requests through [employer] or call your pharmacy and they will forward the refill request to us. Please allow 3  business days for your refill to be completed.          Additional Information About Your Visit        MyChart Information     [employer] gives you secure access to your electronic health record. If you see a primary care provider, you can also send messages to your care team and make appointments. If you have questions, please call your primary care clinic.  If you do not have a primary care provider, please call [phone] and they will assist you.        Care EveryWhere ID     This is your Care EveryWhere ID. This could be used by other organizations to access your [place] medical records  [id number]        Your Vitals Were     Pulse Last Period Breastfeeding? BMI (Body Mass Index)          78 02/20/2017 (Approximate) No 31.12 kg/m2         Blood Pressure from Last 3 Encounters:   11/16/17 128/78   11/08/17 110/70   10/18/17 104/70    Weight from Last 3 Encounters:   11/16/17 187 lb (84.8 kg)   11/08/17 185 lb (83.9 kg)   10/18/17 183 lb (83 kg)              Today, you had the following     No orders found for display       Primary Care Provider Office Phone # Fax #    Natali BRIAN [place] [facility] [phone]       [id number] [facility] [address]        Equal Access to Services     LAZARO GOMEZ AH: Hadii aad ku hadasho Soomaali, waaxda luqadaha, qaybta kaalmada adeegyada, waxay katiein haytanvin nallely carolina lajoshua mcwilliams. So [facility] [phone].    ATENCIÓN: Si habla español, tiene a collins disposición servicios gratuitos de asistencia lingüística. Llame al [phone].    We comply with applicable federal civil rights laws and [place] laws. We do not discriminate on the basis of race, color, national origin, age, disability, sex, sexual orientation, or gender identity.            Thank you!     Thank you for choosing [facility]  for your care. Our goal is always to provide you with excellent care. Hearing back from our patients is one way we can continue to improve our services. Please take  a few minutes to complete the written survey that you may receive in the mail after your visit with us. Thank you!             Your Updated Medication List - Protect others around you: Learn how to safely use, store and throw away your medicines at www.disposemymeds.org.          This list is accurate as of: 11/16/17 10:15 AM.  Always use your most recent med list.                   Brand Name Dispense Instructions for use Diagnosis    ferrous sulfate 325 (65 FE) MG tablet    IRON    90 tablet    Take 1 tablet (325 mg) by mouth daily (with breakfast)    Encounter for supervision of other normal pregnancy in third trimester, Iron deficiency anemia secondary to inadequate dietary iron intake       prenatal multivitamin plus iron 27-0.8 MG Tabs per tablet     100 tablet    Take 1 tablet by mouth daily    Pregnancy test positive

## 2017-11-17 LAB
GP B STREP DNA SPEC QL NAA+PROBE: NEGATIVE
SPECIMEN SOURCE: NORMAL

## 2017-11-24 ENCOUNTER — PRENATAL OFFICE VISIT (OUTPATIENT)
Dept: OBGYN | Facility: CLINIC | Age: 32
End: 2017-11-24
Payer: COMMERCIAL

## 2017-11-24 VITALS
WEIGHT: 190 LBS | SYSTOLIC BLOOD PRESSURE: 120 MMHG | HEART RATE: 80 BPM | DIASTOLIC BLOOD PRESSURE: 80 MMHG | BODY MASS INDEX: 31.62 KG/M2

## 2017-11-24 DIAGNOSIS — Z34.83 ENCOUNTER FOR SUPERVISION OF OTHER NORMAL PREGNANCY IN THIRD TRIMESTER: Primary | ICD-10-CM

## 2017-11-24 PROCEDURE — 99207 ZZC COMPLICATED OB VISIT: CPT | Performed by: OBSTETRICS & GYNECOLOGY

## 2017-11-24 NOTE — NURSING NOTE
"Chief Complaint   Patient presents with     Prenatal Care     baby active and moving - some contractions daily - bilateral hand swelling and numbness in am - feet swelling     38w0d    Initial /80 (BP Location: Left arm, Patient Position: Chair, Cuff Size: Adult Regular)  Pulse 80  Wt 190 lb (86.2 kg)  LMP 02/20/2017 (Approximate)  BMI 31.62 kg/m2 Estimated body mass index is 31.62 kg/(m^2) as calculated from the following:    Height as of 9/8/17: 5' 5\" (1.651 m).    Weight as of this encounter: 190 lb (86.2 kg).  Medication Reconciliation: complete     Nurse assisted visit.  Sharon Lee MA.      "

## 2017-11-24 NOTE — MR AVS SNAPSHOT
After Visit Summary   11/24/2017    Rosette Aguilar    MRN: [id number]           Patient Information     Date Of Birth          1985        Visit Information        Provider Department      11/24/2017 10:15 AM Sebastian Sanford MD [facility]        Today's Diagnoses     Encounter for supervision of other normal pregnancy in third trimester    -  1       Follow-ups after your visit        Follow-up notes from your care team     Return in about 1 week (around 12/1/2017).      Your next 10 appointments already scheduled     Nov 30, 2017  9:45 AM CST   ESTABLISHED PRENATAL with Allyson Church MD   [facility] ([facility])    [address]  [address]  [place] [id number] [phone]            Dec 07, 2017  9:15 AM CST   ESTABLISHED PRENATAL with Allyson Church MD   [facility] ([facility]    [address]  [address]  [facility] [address] [phone]              Who to contact     If you have questions or need follow up information about today's clinic visit or your schedule please contact [facility] directly at [phone].  Normal or non-critical lab and imaging results will be communicated to you by MyChart, letter or phone within 4 business days after the clinic has received the results. If you do not hear from us within 7 days, please contact the clinic through MyChart or phone. If you have a critical or abnormal lab result, we will notify you by phone as soon as possible.  Submit refill requests through [employer] or call your pharmacy and they will forward the refill request to us. Please allow 3 business days for your refill to be completed.          Additional Information About Your Visit        MyChart Information     [employer] gives you secure access to your electronic health record. If you see a primary care provider, you can also send messages to your care  team and make appointments. If you have questions, please call your primary care clinic.  If you do not have a primary care provider, please call [phone] and they will assist you.        Care EveryWhere ID     This is your Care EveryWhere ID. This could be used by other organizations to access your [place] medical records  [id number]        Your Vitals Were     Pulse Last Period BMI (Body Mass Index)             80 02/20/2017 (Approximate) 31.62 kg/m2          Blood Pressure from Last 3 Encounters:   11/24/17 120/80   11/16/17 128/78   11/08/17 110/70    Weight from Last 3 Encounters:   11/24/17 190 lb (86.2 kg)   11/16/17 187 lb (84.8 kg)   11/08/17 185 lb (83.9 kg)              Today, you had the following     No orders found for display       Primary Care Provider Office Phone # Fax #    Natali Chuck Izquierdo, APRN [facility] [phone] [address] [facility] [address]        Equal Access to Services     [facility] HospitalCHARLOTTE : Hadii aad ku hadasho Soomaali, waaxda luqadaha, qaybta kaalmada adeegyada, waxay idiin hayaan nallely shoemaker . So [facility] [phone].    ATENCIÓN: Si habla español, tiene a collins disposición servicios gratuitos de asistencia lingüística. Llame al [phone].    We comply with applicable federal civil rights laws and [place] laws. We do not discriminate on the basis of race, color, national origin, age, disability, sex, sexual orientation, or gender identity.            Thank you!     Thank you for choosing [facility]  for your care. Our goal is always to provide you with excellent care. Hearing back from our patients is one way we can continue to improve our services. Please take a few minutes to complete the written survey that you may receive in the mail after your visit with us. Thank you!             Your Updated Medication List - Protect others around you: Learn how to safely use, store and throw away your medicines at www.disposemymeds.org.           This list is accurate as of: 11/24/17 12:06 PM.  Always use your most recent med list.                   Brand Name Dispense Instructions for use Diagnosis    ferrous sulfate 325 (65 FE) MG tablet    IRON    90 tablet    Take 1 tablet (325 mg) by mouth daily (with breakfast)    Encounter for supervision of other normal pregnancy in third trimester, Iron deficiency anemia secondary to inadequate dietary iron intake       prenatal multivitamin plus iron 27-0.8 MG Tabs per tablet     100 tablet    Take 1 tablet by mouth daily    Pregnancy test positive

## 2017-11-28 ENCOUNTER — NURSE TRIAGE (OUTPATIENT)
Dept: NURSING | Facility: CLINIC | Age: 32
End: 2017-11-28

## 2017-11-28 ENCOUNTER — ANESTHESIA (OUTPATIENT)
Dept: OBGYN | Facility: CLINIC | Age: 32
End: 2017-11-28
Payer: COMMERCIAL

## 2017-11-28 ENCOUNTER — ANESTHESIA EVENT (OUTPATIENT)
Dept: OBGYN | Facility: CLINIC | Age: 32
End: 2017-11-28
Payer: COMMERCIAL

## 2017-11-28 ENCOUNTER — HOSPITAL ENCOUNTER (INPATIENT)
Facility: CLINIC | Age: 32
LOS: 2 days | Discharge: HOME | End: 2017-11-30
Attending: OBSTETRICS & GYNECOLOGY | Admitting: OBSTETRICS & GYNECOLOGY
Payer: COMMERCIAL

## 2017-11-28 LAB
ALBUMIN UR-MCNC: NEGATIVE MG/DL
ALT SERPL W P-5'-P-CCNC: 15 U/L (ref 0–50)
APPEARANCE UR: ABNORMAL
AST SERPL W P-5'-P-CCNC: 19 U/L (ref 0–45)
BACTERIA #/AREA URNS HPF: ABNORMAL /HPF
BILIRUB UR QL STRIP: NEGATIVE
COLOR UR AUTO: ABNORMAL
CREAT SERPL-MCNC: 0.57 MG/DL (ref 0.52–1.04)
ERYTHROCYTE [DISTWIDTH] IN BLOOD BY AUTOMATED COUNT: 16.2 % (ref 10–15)
GFR SERPL CREATININE-BSD FRML MDRD: >90 ML/MIN/1.7M2
GLUCOSE UR STRIP-MCNC: NEGATIVE MG/DL
HCT VFR BLD AUTO: 35.2 % (ref 35–47)
HGB BLD-MCNC: 11.7 G/DL (ref 11.7–15.7)
HGB UR QL STRIP: ABNORMAL
KETONES UR STRIP-MCNC: NEGATIVE MG/DL
LEUKOCYTE ESTERASE UR QL STRIP: ABNORMAL
MCH RBC QN AUTO: 30.5 PG (ref 26.5–33)
MCHC RBC AUTO-ENTMCNC: 33.2 G/DL (ref 31.5–36.5)
MCV RBC AUTO: 92 FL (ref 78–100)
MUCOUS THREADS #/AREA URNS LPF: PRESENT /LPF
NITRATE UR QL: NEGATIVE
PH UR STRIP: 6 PH (ref 5–7)
PLATELET # BLD AUTO: 266 10E9/L (ref 150–450)
RBC # BLD AUTO: 3.83 10E12/L (ref 3.8–5.2)
RBC #/AREA URNS AUTO: 1 /HPF (ref 0–2)
SOURCE: ABNORMAL
SP GR UR STRIP: 1 (ref 1–1.03)
SQUAMOUS #/AREA URNS AUTO: 3 /HPF (ref 0–1)
T PALLIDUM IGG+IGM SER QL: NEGATIVE
URATE SERPL-MCNC: 5 MG/DL (ref 2.6–6)
UROBILINOGEN UR STRIP-MCNC: 0 MG/DL (ref 0–2)
WBC # BLD AUTO: 19 10E9/L (ref 4–11)
WBC #/AREA URNS AUTO: 12 /HPF (ref 0–2)

## 2017-11-28 PROCEDURE — 37000011 ZZH ANESTHESIA WARD SERVICE

## 2017-11-28 PROCEDURE — 25000128 H RX IP 250 OP 636

## 2017-11-28 PROCEDURE — 88307 TISSUE EXAM BY PATHOLOGIST: CPT | Mod: 26 | Performed by: OBSTETRICS & GYNECOLOGY

## 2017-11-28 PROCEDURE — 25000132 ZZH RX MED GY IP 250 OP 250 PS 637: Performed by: OBSTETRICS & GYNECOLOGY

## 2017-11-28 PROCEDURE — S0191 MISOPROSTOL, ORAL, 200 MCG: HCPCS

## 2017-11-28 PROCEDURE — 40000083 ZZH STATISTIC IP LACTATION SERVICES 1-15 MIN

## 2017-11-28 PROCEDURE — 81001 URINALYSIS AUTO W/SCOPE: CPT | Performed by: OBSTETRICS & GYNECOLOGY

## 2017-11-28 PROCEDURE — 12000029 ZZH R&B OB INTERMEDIATE

## 2017-11-28 PROCEDURE — 25000132 ZZH RX MED GY IP 250 OP 250 PS 637

## 2017-11-28 PROCEDURE — 25000125 ZZHC RX 250: Performed by: OBSTETRICS & GYNECOLOGY

## 2017-11-28 PROCEDURE — 40000671 ZZH STATISTIC ANESTHESIA CASE

## 2017-11-28 PROCEDURE — 86780 TREPONEMA PALLIDUM: CPT | Performed by: OBSTETRICS & GYNECOLOGY

## 2017-11-28 PROCEDURE — 87086 URINE CULTURE/COLONY COUNT: CPT | Performed by: OBSTETRICS & GYNECOLOGY

## 2017-11-28 PROCEDURE — 72200001 ZZH LABOR CARE VAGINAL DELIVERY SINGLE

## 2017-11-28 PROCEDURE — 10907ZC DRAINAGE OF AMNIOTIC FLUID, THERAPEUTIC FROM PRODUCTS OF CONCEPTION, VIA NATURAL OR ARTIFICIAL OPENING: ICD-10-PCS | Performed by: OBSTETRICS & GYNECOLOGY

## 2017-11-28 PROCEDURE — 84450 TRANSFERASE (AST) (SGOT): CPT | Performed by: OBSTETRICS & GYNECOLOGY

## 2017-11-28 PROCEDURE — 82565 ASSAY OF CREATININE: CPT | Performed by: OBSTETRICS & GYNECOLOGY

## 2017-11-28 PROCEDURE — 3E0R3BZ INTRODUCTION OF ANESTHETIC AGENT INTO SPINAL CANAL, PERCUTANEOUS APPROACH: ICD-10-PCS | Performed by: ANESTHESIOLOGY

## 2017-11-28 PROCEDURE — 84550 ASSAY OF BLOOD/URIC ACID: CPT | Performed by: OBSTETRICS & GYNECOLOGY

## 2017-11-28 PROCEDURE — 25000128 H RX IP 250 OP 636: Performed by: OBSTETRICS & GYNECOLOGY

## 2017-11-28 PROCEDURE — 88307 TISSUE EXAM BY PATHOLOGIST: CPT | Performed by: OBSTETRICS & GYNECOLOGY

## 2017-11-28 PROCEDURE — 59400 OBSTETRICAL CARE: CPT | Performed by: OBSTETRICS & GYNECOLOGY

## 2017-11-28 PROCEDURE — 00HU33Z INSERTION OF INFUSION DEVICE INTO SPINAL CANAL, PERCUTANEOUS APPROACH: ICD-10-PCS | Performed by: ANESTHESIOLOGY

## 2017-11-28 PROCEDURE — 36415 COLL VENOUS BLD VENIPUNCTURE: CPT | Performed by: OBSTETRICS & GYNECOLOGY

## 2017-11-28 PROCEDURE — 88341 IMHCHEM/IMCYTCHM EA ADD ANTB: CPT | Mod: 26 | Performed by: OBSTETRICS & GYNECOLOGY

## 2017-11-28 PROCEDURE — 88341 IMHCHEM/IMCYTCHM EA ADD ANTB: CPT | Performed by: OBSTETRICS & GYNECOLOGY

## 2017-11-28 PROCEDURE — 84460 ALANINE AMINO (ALT) (SGPT): CPT | Performed by: OBSTETRICS & GYNECOLOGY

## 2017-11-28 PROCEDURE — 88342 IMHCHEM/IMCYTCHM 1ST ANTB: CPT | Performed by: OBSTETRICS & GYNECOLOGY

## 2017-11-28 PROCEDURE — 88342 IMHCHEM/IMCYTCHM 1ST ANTB: CPT | Mod: 26 | Performed by: OBSTETRICS & GYNECOLOGY

## 2017-11-28 PROCEDURE — 85027 COMPLETE CBC AUTOMATED: CPT | Performed by: OBSTETRICS & GYNECOLOGY

## 2017-11-28 RX ORDER — OXYTOCIN 10 [USP'U]/ML
10 INJECTION, SOLUTION INTRAMUSCULAR; INTRAVENOUS
Status: DISCONTINUED | OUTPATIENT
Start: 2017-11-28 | End: 2017-11-30 | Stop reason: HOSPADM

## 2017-11-28 RX ORDER — MISOPROSTOL 200 UG/1
TABLET ORAL
Status: COMPLETED
Start: 2017-11-28 | End: 2017-11-28

## 2017-11-28 RX ORDER — IBUPROFEN 800 MG/1
800 TABLET, FILM COATED ORAL EVERY 6 HOURS PRN
Status: DISCONTINUED | OUTPATIENT
Start: 2017-11-28 | End: 2017-11-30 | Stop reason: HOSPADM

## 2017-11-28 RX ORDER — OXYTOCIN/0.9 % SODIUM CHLORIDE 30/500 ML
340 PLASTIC BAG, INJECTION (ML) INTRAVENOUS CONTINUOUS PRN
Status: DISCONTINUED | OUTPATIENT
Start: 2017-11-28 | End: 2017-11-30 | Stop reason: HOSPADM

## 2017-11-28 RX ORDER — ALUMINA, MAGNESIA, AND SIMETHICONE 2400; 2400; 240 MG/30ML; MG/30ML; MG/30ML
30 SUSPENSION ORAL EVERY 4 HOURS PRN
Status: DISCONTINUED | OUTPATIENT
Start: 2017-11-28 | End: 2017-11-30 | Stop reason: HOSPADM

## 2017-11-28 RX ORDER — ONDANSETRON 2 MG/ML
4 INJECTION INTRAMUSCULAR; INTRAVENOUS EVERY 6 HOURS PRN
Status: DISCONTINUED | OUTPATIENT
Start: 2017-11-28 | End: 2017-11-28

## 2017-11-28 RX ORDER — NALOXONE HYDROCHLORIDE 0.4 MG/ML
.1-.4 INJECTION, SOLUTION INTRAMUSCULAR; INTRAVENOUS; SUBCUTANEOUS
Status: DISCONTINUED | OUTPATIENT
Start: 2017-11-28 | End: 2017-11-30 | Stop reason: HOSPADM

## 2017-11-28 RX ORDER — OXYCODONE AND ACETAMINOPHEN 5; 325 MG/1; MG/1
1 TABLET ORAL
Status: DISCONTINUED | OUTPATIENT
Start: 2017-11-28 | End: 2017-11-28

## 2017-11-28 RX ORDER — IBUPROFEN 600 MG/1
600 TABLET, FILM COATED ORAL EVERY 6 HOURS PRN
Status: DISCONTINUED | OUTPATIENT
Start: 2017-11-28 | End: 2017-11-30 | Stop reason: HOSPADM

## 2017-11-28 RX ORDER — CARBOPROST TROMETHAMINE 250 UG/ML
250 INJECTION, SOLUTION INTRAMUSCULAR
Status: DISCONTINUED | OUTPATIENT
Start: 2017-11-28 | End: 2017-11-28

## 2017-11-28 RX ORDER — AMOXICILLIN 250 MG
2 CAPSULE ORAL 2 TIMES DAILY PRN
Status: DISCONTINUED | OUTPATIENT
Start: 2017-11-28 | End: 2017-11-30 | Stop reason: HOSPADM

## 2017-11-28 RX ORDER — OXYTOCIN 10 [USP'U]/ML
10 INJECTION, SOLUTION INTRAMUSCULAR; INTRAVENOUS
Status: DISCONTINUED | OUTPATIENT
Start: 2017-11-28 | End: 2017-11-28

## 2017-11-28 RX ORDER — OXYTOCIN/0.9 % SODIUM CHLORIDE 30/500 ML
100-340 PLASTIC BAG, INJECTION (ML) INTRAVENOUS CONTINUOUS PRN
Status: COMPLETED | OUTPATIENT
Start: 2017-11-28 | End: 2017-11-28

## 2017-11-28 RX ORDER — BISACODYL 10 MG
10 SUPPOSITORY, RECTAL RECTAL DAILY PRN
Status: DISCONTINUED | OUTPATIENT
Start: 2017-11-30 | End: 2017-11-30 | Stop reason: HOSPADM

## 2017-11-28 RX ORDER — ACETAMINOPHEN 325 MG/1
650 TABLET ORAL EVERY 4 HOURS PRN
Status: DISCONTINUED | OUTPATIENT
Start: 2017-11-28 | End: 2017-11-28

## 2017-11-28 RX ORDER — AMOXICILLIN 250 MG
1 CAPSULE ORAL 2 TIMES DAILY PRN
Status: DISCONTINUED | OUTPATIENT
Start: 2017-11-28 | End: 2017-11-30 | Stop reason: HOSPADM

## 2017-11-28 RX ORDER — MISOPROSTOL 200 UG/1
400 TABLET ORAL
Status: DISCONTINUED | OUTPATIENT
Start: 2017-11-28 | End: 2017-11-30 | Stop reason: HOSPADM

## 2017-11-28 RX ORDER — METHYLERGONOVINE MALEATE 0.2 MG/ML
200 INJECTION INTRAVENOUS
Status: DISCONTINUED | OUTPATIENT
Start: 2017-11-28 | End: 2017-11-28

## 2017-11-28 RX ORDER — ACETAMINOPHEN 325 MG/1
650 TABLET ORAL EVERY 4 HOURS PRN
Status: DISCONTINUED | OUTPATIENT
Start: 2017-11-28 | End: 2017-11-30 | Stop reason: HOSPADM

## 2017-11-28 RX ORDER — NALOXONE HYDROCHLORIDE 0.4 MG/ML
.1-.4 INJECTION, SOLUTION INTRAMUSCULAR; INTRAVENOUS; SUBCUTANEOUS
Status: DISCONTINUED | OUTPATIENT
Start: 2017-11-28 | End: 2017-11-28

## 2017-11-28 RX ORDER — EPHEDRINE SULFATE 50 MG/ML
INJECTION, SOLUTION INTRAMUSCULAR; INTRAVENOUS; SUBCUTANEOUS
Status: DISCONTINUED
Start: 2017-11-28 | End: 2017-11-28 | Stop reason: HOSPADM

## 2017-11-28 RX ORDER — HYDROCORTISONE 2.5 %
CREAM (GRAM) TOPICAL 3 TIMES DAILY PRN
Status: DISCONTINUED | OUTPATIENT
Start: 2017-11-28 | End: 2017-11-30 | Stop reason: HOSPADM

## 2017-11-28 RX ORDER — EPHEDRINE SULFATE 50 MG/ML
5 INJECTION, SOLUTION INTRAMUSCULAR; INTRAVENOUS; SUBCUTANEOUS
Status: DISCONTINUED | OUTPATIENT
Start: 2017-11-28 | End: 2017-11-28

## 2017-11-28 RX ORDER — IBUPROFEN 400 MG/1
400 TABLET, FILM COATED ORAL EVERY 6 HOURS PRN
Status: DISCONTINUED | OUTPATIENT
Start: 2017-11-28 | End: 2017-11-30 | Stop reason: HOSPADM

## 2017-11-28 RX ORDER — FENTANYL CITRATE 50 UG/ML
50-100 INJECTION, SOLUTION INTRAMUSCULAR; INTRAVENOUS
Status: DISCONTINUED | OUTPATIENT
Start: 2017-11-28 | End: 2017-11-28

## 2017-11-28 RX ORDER — SODIUM CHLORIDE, SODIUM LACTATE, POTASSIUM CHLORIDE, CALCIUM CHLORIDE 600; 310; 30; 20 MG/100ML; MG/100ML; MG/100ML; MG/100ML
INJECTION, SOLUTION INTRAVENOUS CONTINUOUS
Status: DISCONTINUED | OUTPATIENT
Start: 2017-11-28 | End: 2017-11-28

## 2017-11-28 RX ORDER — NALBUPHINE HYDROCHLORIDE 10 MG/ML
2.5-5 INJECTION, SOLUTION INTRAMUSCULAR; INTRAVENOUS; SUBCUTANEOUS EVERY 6 HOURS PRN
Status: DISCONTINUED | OUTPATIENT
Start: 2017-11-28 | End: 2017-11-28

## 2017-11-28 RX ORDER — IBUPROFEN 800 MG/1
800 TABLET, FILM COATED ORAL
Status: COMPLETED | OUTPATIENT
Start: 2017-11-28 | End: 2017-11-28

## 2017-11-28 RX ORDER — LANOLIN 100 %
OINTMENT (GRAM) TOPICAL
Status: DISCONTINUED | OUTPATIENT
Start: 2017-11-28 | End: 2017-11-30 | Stop reason: HOSPADM

## 2017-11-28 RX ORDER — OXYTOCIN/0.9 % SODIUM CHLORIDE 30/500 ML
100 PLASTIC BAG, INJECTION (ML) INTRAVENOUS CONTINUOUS
Status: DISCONTINUED | OUTPATIENT
Start: 2017-11-28 | End: 2017-11-30 | Stop reason: HOSPADM

## 2017-11-28 RX ADMIN — ACETAMINOPHEN 650 MG: 325 TABLET, FILM COATED ORAL at 15:31

## 2017-11-28 RX ADMIN — MISOPROSTOL 800 MCG: 200 TABLET ORAL at 07:14

## 2017-11-28 RX ADMIN — IBUPROFEN 800 MG: 800 TABLET, FILM COATED ORAL at 20:23

## 2017-11-28 RX ADMIN — SODIUM CHLORIDE, POTASSIUM CHLORIDE, SODIUM LACTATE AND CALCIUM CHLORIDE: 600; 310; 30; 20 INJECTION, SOLUTION INTRAVENOUS at 06:40

## 2017-11-28 RX ADMIN — IBUPROFEN 800 MG: 800 TABLET, FILM COATED ORAL at 14:00

## 2017-11-28 RX ADMIN — OXYTOCIN-SODIUM CHLORIDE 0.9% IV SOLN 30 UNIT/500ML 340 ML/HR: 30-0.9/5 SOLUTION at 07:11

## 2017-11-28 RX ADMIN — SENNOSIDES AND DOCUSATE SODIUM 1 TABLET: 8.6; 5 TABLET ORAL at 20:23

## 2017-11-28 RX ADMIN — ACETAMINOPHEN 650 MG: 325 TABLET, FILM COATED ORAL at 23:28

## 2017-11-28 RX ADMIN — ALUMINUM HYDROXIDE, MAGNESIUM HYDROXIDE, AND DIMETHICONE 30 ML: 400; 400; 40 SUSPENSION ORAL at 04:16

## 2017-11-28 RX ADMIN — SODIUM CHLORIDE, POTASSIUM CHLORIDE, SODIUM LACTATE AND CALCIUM CHLORIDE 1000 ML: 600; 310; 30; 20 INJECTION, SOLUTION INTRAVENOUS at 04:20

## 2017-11-28 RX ADMIN — Medication: at 05:41

## 2017-11-28 RX ADMIN — IBUPROFEN 800 MG: 800 TABLET, FILM COATED ORAL at 07:39

## 2017-11-28 RX ADMIN — ACETAMINOPHEN 650 MG: 325 TABLET, FILM COATED ORAL at 19:12

## 2017-11-28 NOTE — PLAN OF CARE
Data: Rosette Aguilar transferred to [facility] via wheelchair at 0920. Baby transferred via parent's arms.  Action: Receiving unit notified of transfer: Yes. Patient and family notified of room change. Report given to JIMENA Aguilar. Belongings sent to receiving unit. Accompanied by Registered Nurse. Oriented patient to surroundings. Call light within reach. ID bands double-checked with receiving RN.  Response: Patient tolerated transfer and is stable.

## 2017-11-28 NOTE — LACTATION NOTE
LC to see patient.  This is her third baby and she is a new delivery from this morning.  Her baby latched well this morning after delivery and has been sleepy since.  RN assisted with attempting and awakening.  LC reviewed nursing expectations for the first 24 hours and encouraged her to continue to attempt and provide STS contact.  LC will follow up in the morning.

## 2017-11-28 NOTE — PLAN OF CARE
Data: Patient presented to [facility]: 2017  3:14 AM.  Reason for maternal/fetal assessment is uterine contractions and bloody mucous. Patient reports waking with UC's at 0030 and experienced a bloody mucous when using BR.  Patient is a .  Prenatal record reviewed. Pregnancy has been uncomplicated..  Gestational Age 38w4d. VSS. Fetal movement present. Patient denies leaking of vaginal fluid/rupture of membranes, abdominal pain, pelvic pressure, nausea, vomiting, headache, visual disturbances, epigastric or URQ pain, significant edema. Support person, Jaydon is present. SVE: 4/90/-2 with noted scant bloody show.  BP's per flow sheet slightly elevated.  Action: Verbal consent for EFM. Triage assessment completed. Bill of rights reviewed. Urine sent for protein assessment per UA.  Response: Patient verbalized agreement with plan. Will contact Dr Jarvis Hernández with update and further orders.

## 2017-11-28 NOTE — H&P
[facility] Labor and Delivery History and Physical    Rosette Aguilar MRN# [id number]   Age: 32 year old YOB: 1985     Date of Admission:  2017    Primary care provider: Natali Izquierdo Ra           Chief Complaint:   Rosette Aguilar is a 32 year old  female  Estimated Date of Delivery: Dec 8, 2017  who is 38w4d pregnant and being admitted for active labor management.          Pregnancy history:     OBSTETRIC HISTORY:    Obstetric History       T2      L2     SAB0   TAB0   Ectopic0   Multiple0   Live Births2       # Outcome Date GA Lbr Jose/2nd Weight Sex Delivery Anes PTL Lv   3 Current            2 Term 07 41w0d  3.856 kg (8 lb 8 oz) M  None N EVENS      Name: Eddie   1 Term 04 40w0d  2.948 kg (6 lb 8 oz) M IVD EPI N EVENS      Name: Ron      Complications: GDM (gestational diabetes mellitus)          EDC: Estimated Date of Delivery: 17    Prenatal Labs:   Lab Results   Component Value Date    ABO A 2017    RH  Pos 2017    AS Neg 2017    HEPBANG Nonreactive 2017    CHPCRT  2017     Negative   Negative for C. trachomatis rRNA by transcription mediated amplification.   A negative result by transcription mediated amplification does not preclude the   presence of C. trachomatis infection because results are dependent on proper   and adequate collection, absence of inhibitors, and sufficient rRNA to be   detected.      GCPCRT  2017     Negative   Negative for N. gonorrhoeae rRNA by transcription mediated amplification.   A negative result by transcription mediated amplification does not preclude the   presence of N. gonorrhoeae infection because results are dependent on proper   and adequate collection, absence of inhibitors, and sufficient rRNA to be   detected.      TREPAB Negative 09/15/2017    HGB 10.0 (L) 09/15/2017       GBS Status:   Lab Results   Component Value Date    GBS Negative  11/16/2017       Active Problem List  Patient Active Problem List   Diagnosis     CARDIOVASCULAR SCREENING; LDL GOAL LESS THAN 160     Supervision of normal pregnancy     Indication for care in labor or delivery       Medication Prior to Admission  Prescriptions Prior to Admission   Medication Sig Dispense Refill Last Dose     ferrous sulfate (IRON) 325 (65 FE) MG tablet Take 1 tablet (325 mg) by mouth daily (with breakfast) 90 tablet 3 11/27/2017 at Unknown time     Prenatal Vit-Fe Fumarate-FA (PRENATAL MULTIVITAMIN  PLUS IRON) 27-0.8 MG TABS per tablet Take 1 tablet by mouth daily 100 tablet 3 11/27/2017 at Unknown time   .        Maternal Past Medical History:   History reviewed. No pertinent past medical history.                    Family History:   I have reviewed this patient's family history            Social History:   I have reviewed this patient's social history         Review of Systems:   C: NEGATIVE for fever, chills, change in weight  E/M: NEGATIVE for ear, mouth and throat problems  R: NEGATIVE for significant cough or SOB  CV: NEGATIVE for chest pain, palpitations or peripheral edema          Physical Exam:   Vitals were reviewed  All vitals stable  Temp: 98.4  F (36.9  C) Temp src: Oral BP: 105/58     Resp: 16          Constitutional:   awake, alert, cooperative, no apparent distress, and appears stated age     Eyes:   Lids and lashes normal, pupils equal, round and reactive to light, extra ocular muscles intact, sclera clear, conjunctiva normal     ENT:   Normocephalic, without obvious abnormality, atraumatic, sinuses nontender on palpation, external ears without lesions, oral pharynx with moist mucous membranes, tonsils without erythema or exudates, gums normal and good dentition.     Neck:   Supple, symmetrical, trachea midline, no adenopathy, thyroid symmetric, not enlarged and no tenderness, skin normal     Hematologic / Lymphatic:   no cervical lymphadenopathy and no supraclavicular  lymphadenopathy     Back:   Symmetric, no curvature, spinous processes are non-tender on palpation, paraspinous muscles are non-tender on palpation, no costal vertebral tenderness     Lungs:   No increased work of breathing, good air exchange, clear to auscultation bilaterally, no crackles or wheezing     Cardiovascular:   Normal apical impulse, regular rate and rhythm, normal S1 and S2, no S3 or S4, and no murmur noted     Abdomen:   No scars, normal bowel sounds, soft, deleon infant vtx EFW 7.5#, no masses palpated, no hepatosplenomegally     Genitounirinary:   External Genitalia:  General appearance; normal, Hair distribution; normal     Musculoskeletal:   There is no redness, warmth, or swelling of the joints.  Full range of motion noted.  Motor strength is 5 out of 5 all extremities bilaterally.  Tone is normal.                            Cervix:   Membranes: AROM  clear amniotic fluid   Dilation: 9   Effacement: 100%   Station:0   Consistency: soft   Position: Mid  Presentation:Cephalic  Fetal Heart Rate Tracing: reactive and reassuring, appropriate for gestational age, Tier 1 (normal)  Tocometer: external monitor and adequate                       Assessment:   Rosette Aguilar is a 38w4d pregnant female admitted with active labor management and AROM.  Epidural in place          Plan:   Admit - see IP orders  Anticipate Desi Hernández MD

## 2017-11-28 NOTE — PROVIDER NOTIFICATION
11/28/17 0349   Provider Notification   Provider Name/Title Dr. Hernández   Method of Notification Phone   Request Evaluate - Remote   Notification Reason Patient Arrived;Pain;Uterine Activity;Membrane Status;Maternal Vital Sign Change;Bleeding;SVE   Comments   Comments Inpt intrapartum orders received.   Patient admitted and moved to 411. Oriented to plan of care. IVF's LR started for anticipated epidural request.

## 2017-11-28 NOTE — ANESTHESIA PROCEDURE NOTES
Peripheral nerve/Neuraxial procedure note : epidural catheter  Pre-Procedure  Performed by MONSERRAT UMANZOR  Referred by LAMINE  Location: OB      Pre-Anesthestic Checklist: patient identified, IV checked, risks and benefits discussed, informed consent, monitors and equipment checked, pre-op evaluation and at physician/surgeon's request    .   Procedure Documentation    .    Procedure:    Epidural catheter.     .  .       Assessment/Narrative  .  .  . Comments:  Patient desires Labor Epidural for labor analgesia. Vaginal delivery anticipated.    Chart reviewed. Patient examined. No changes to pre procedure chart review. Risks including but not limited to bleeding, infection, nerve injury, PDPH, intrathecal injection, high block, incomplete block, one-sided block, back pain, and low blood pressure discussed in detail. Questions answered. Consent signed.    Pause for the Cause completed. NIBP and pulse ox functioning. L&D nurse present.    Procedure: Sitting. Betadine prep x 3. Sterile drape applied.  Lidocaine 1% x 2 cc local infiltration at L 3-4.  17 G. Tu needle ML BIENVENIDO 1 attempt.  No CSF, paresthesia or blood. 20 g. Epidural catheter inserted w/o resistance 5 cm.  Negative aspiration for CSF and blood. Filter in line.  Test dose Lidocaine 1.5% w/ 1:200,000 epi x 3 cc injected. Negative for neuro change or symptoms of intravascular injection.  Bolus dose: Marcaine 0.25% 5cc x 2 doses (10 cc total).  Infusion orders written.    I or my partner am immediately available. I or my partner will monitor the patient and supervise nursing care at necessary intervals.    Lis

## 2017-11-28 NOTE — PROVIDER NOTIFICATION
11/28/17 0655   Provider Notification   Provider Name/Title Dr. Hernández   Method of Notification At Bedside   Request Attend Delivery

## 2017-11-28 NOTE — TELEPHONE ENCOUNTER
"  Reason for Disposition    [1] History of prior delivery (multipara) AND [2] contractions < 10 minutes apart AND [3] present 1 hour    Additional Information    Negative: Passed out (i.e., lost consciousness, collapsed and was not responding)    Negative: Shock suspected (e.g., cold/pale/clammy skin, too weak to stand, low BP, rapid pulse)    Negative: Difficult to awaken or acting confused  (e.g., disoriented, slurred speech)    Negative: [1] SEVERE abdominal pain (e.g., excruciating) AND [2] constant AND [3] present > 1 hour    Negative: Severe bleeding (e.g., continuous red blood from vagina, or large blood clots)    Negative: Umbilical cord hanging out of the vagina (shiny, white, curled appearance, \"like telephone cord\")    Negative: Uncontrollable urge to push (i.e., feels like baby is coming out now)    Negative: Can see baby    Negative: Sounds like a life-threatening emergency to the triager    Negative: Pregnant < 37 weeks (i.e., )    Negative: [1] Uncertain delivery date AND [2] possibly pregnant < 37 weeks (i.e., )    Negative: [1] First baby (primipara) AND [2] contractions < 6 minutes apart  AND [3] present 2 hours    Answer Assessment - Initial Assessment Questions  1. ONSET: \"When did the symptoms begin?\"         2 hours  2. CONTRACTIONS: \"Describe the contractions that you are having.\" (e.g., duration, frequency, regularity, severity)      Every 8-9 minutes  3. LAUREN: \"What date are you expecting to deliver?\"      17  4. PARITY: \"Have you had a baby before?\" If yes, \"How long did the labor last?\"      3rd baby  5. FETAL MOVEMENT: \"Has the baby's movement decreased or changed significantly from normal?\"      ok  6. OTHER SYMPTOMS: \"Do you have any other symptoms?\" (e.g., leaking fluid from vagina, vaginal bleeding, fever, hand/facial swelling)      mucous    Protocols used: PREGNANCY - LABOR-ADULT-AH    "

## 2017-11-28 NOTE — PROVIDER NOTIFICATION
11/28/17 0520   Provider Notification   Provider Name/Title Dr. Anthony   Method of Notification At Bedside   Request Evaluate in Person   Notification Reason Pain

## 2017-11-28 NOTE — PROVIDER NOTIFICATION
11/28/17 0558   Provider Notification   Provider Name/Title Dr. Hernández   Method of Notification Phone   Request Evaluate in Person   Notification Reason SVE;Bleeding   Comments   Comments Updated on recent 8cm exam, bright red bloody show. He will come be coming in.

## 2017-11-28 NOTE — ANESTHESIA PREPROCEDURE EVALUATION
PAC NOTE:       ANESTHESIA PRE EVALUATION:  Anesthesia Evaluation       history and physical reviewed .      No history of anesthetic complications          ROS/MED HX    ENT/Pulmonary:  - neg pulmonary ROS     Neurologic:  - neg neurologic ROS     Cardiovascular:  - neg cardiovascular ROS       METS/Exercise Tolerance:     Hematologic:         Musculoskeletal:         GI/Hepatic:     (+) GERD       Renal/Genitourinary:         Endo:         Psychiatric:         Infectious Disease:         Malignancy:         Other:                     Physical Exam  Normal systems: cardiovascular and pulmonary    Airway   Mallampati: II  TM distance: > 3 FB  Neck ROM: full  Mouth opening: > 3 cm    Dental     Cardiovascular       Pulmonary     Other findings: Lab Test        09/15/17     04/18/17                       1433          0929          WBC          11.0         8.7           HGB          10.0*        12.6          MCV          95           92            PLT          274          259            No lab results found.    neg OB ROS            Anesthesia Plan      History & Physical Review      ASA Status:  .  OB Epidural Asa: 2            Postoperative Care      Consents  Anesthetic plan, risks, benefits and alternatives discussed with:  Patient..                            .

## 2017-11-28 NOTE — PLAN OF CARE
Problem: Patient Care Overview  Goal: Plan of Care/Patient Progress Review  Outcome: Improving  Patient meeting expected goals. Denies pain at this time, and is aware of pain med options.  Infant has not  since transfer but has been doing skin to skin.  B/P slightly elevated, will recheck.  Denies PIH sx. Labs drawn. Has been up to bathroom, steady on feet and voided well.  Ice pack to perineum. IV is SL. Writer will assist with breastfeeding. Bonding well with infant.

## 2017-11-28 NOTE — PROGRESS NOTES
Small marginal approx 5 cm abruption noted  I feel this may have accounted for the vaginal bleeding noted in the late 1st stage of labor  See delivery summary

## 2017-11-28 NOTE — IP AVS SNAPSHOT
MRN:[id number]                      After Visit Summary   11/28/2017    Rosette Aguilar    MRN: [id number]           Thank you!     Thank you for choosing [facility] for your care. Our goal is always to provide you with excellent care. Hearing back from our patients is one way we can continue to improve our services. Please take a few minutes to complete the written survey that you may receive in the mail after you visit. If you would like to speak to someone directly about your visit please contact Patient Relations at [phone]. Thank you!          Patient Information     Date Of Birth          1985        Designated Caregiver       Most Recent Value    Caregiver    Will someone help with your care after discharge? no      About your hospital stay     You were admitted on:  November 28, 2017 You last received care in the:  [facility]    You were discharged on:  November 30, 2017       Who to Call     For medical emergencies, please call 911.  For non-urgent questions about your medical care, please call your primary care provider or clinic, [phone]          Attending Provider     Provider Specialty    Jarvis Hernández MD OB/Gyn       Primary Care Provider Office Phone # Fax #    Natali BRIAN [place] [facility] [phone]      Your next 10 appointments already scheduled     Dec 07, 2017  9:15 AM CST   ESTABLISHED PRENATAL with Allyson Church MD   [facility]    [address] [place] [address]  [address]  [facility] [address] [phone]              Further instructions from your care team       Postpartum pain control:    You will likely experience cramping for 1-2 weeks.   You can take up to 3 tabs of ibuprofen (600mg) every 6 hours as needed for pain.  You can additionally take 1-2 tabs of tylenol (325-650mg regular strength 500-1000mg extra strength), every 6 hours for additional pain  control as needed.  It is best to alternate your medications so you are taking something every 3 hours if you are having continued pain.    If you have vaginal pain you can take sitz baths 1-2x/day. Fill the tub with 2-3 inches of warm water and soak the perineal and vaginal area for 10 minutes. Ice or warm packs can also be applied for comfort.    Please call the office for:  Temperature above 100.4  Severe headache, especially with changes in your vision  Heavy bleeding (more than one pad an hour for more than 2 hours in a row)  Any other new, concerning symptoms.    Constipation  You may use the following products to ease constipation:    1. Stool softeners such as metamucil or benefiber  2. senna 1-2 times daily  3. Fiber supplements  4. miralax (over the counter).  5. Dulcolax    Please be sure to keep adequately hydrated; 6-8 8oz glasses daily, more if needed to compensate for exercise, sweating, etc.      More specific dosing can be found below:    - Metamucil 28g daily PO with 8oz of water  - senna-docusate 8.6-50 MG per tablet PO 1 tablet, Oral, 2 TIMES DAILY, Start with 1 tablet PO BID, reduce to 1 tablet daily when having daily BMs. Stop for loose stools.  - docusate sodium (COLACE) capsule 100 mg, Oral, 2 TIMES DAILY, To prevent constipation. Hold for loose stools.  - bisacodyl (DULCOLAX) suppository 10 mg, Rectal, DAILY PRN, constipation, Hold for loose stools.       Please contact the clinic with any questions.  Please schedule a follow up appointment with your primary OB/Gyn provider in 6 weeks.   You can respond with [facility] or call [place] at [phone].    Postpartum Vaginal Delivery Instructions  Lactation phone number: [phone]  Follow up in 6 weeks for postpartum visit    Activity       Ask family and friends for help when you need it.    Do not place anything in your vagina for 6 weeks.    You are not restricted on other activities, but take it easy for a few weeks to allow your body  to recover from delivery.  You are able to do any activities you feel up to that point.    No driving until you have stopped taking your pain medications (usually two weeks after delivery).     Call your health care provider if you have any of these symptoms:       Increased pain, swelling, redness, or fluid around your stiches from an episiotomy or perineal tear.    A fever above 100.4 F (38 C) with or without chills when placing a thermometer under your tongue.    You soak a sanitary pad with blood within 1 hour, or you see blood clots larger than a golf ball.    Bleeding that lasts more than 6 weeks.    Vaginal discharge that smells bad.    Severe pain, cramping or tenderness in your lower belly area.    A need to urinate more frequently (use the toilet more often), more urgently (use the toilet very quickly), or it burns when you urinate.    Nausea and vomiting.    Redness, swelling or pain around a vein in your leg.    Problems breastfeeding or a red or painful area on your breast.    Chest pain and cough or are gasping for air.    Problems coping with sadness, anxiety, or depression.  If you have any concerns about hurting yourself or the baby, call your provider immediately.     You have questions or concerns after you return home.     Keep your hands clean:  Always wash your hands before touching your perineal area and stitches.  This helps reduce your risk of infection.  If your hands aren't dirty, you may use an alcohol hand-rub to clean your hands. Keep your nails clean and short.        Pending Results     Date and Time Order Name Status Description    11/28/2017 0733 Placenta Path Order and Indications (PLACENTA) In process             Statement of Approval     Ordered          11/30/17 0935  I have reviewed and agree with all the recommendations and orders detailed in this document.  EFFECTIVE NOW     Approved and electronically signed by:  Sharmila Ramirez MD             Admission Information      "Date & Time Provider Department Dept. Phone    11/28/2017 Jarvis Hernández MD [facility] [phone]      Your Vitals Were     Blood Pressure Pulse Temperature Respirations Height Weight    127/79 87 97.8  F (36.6  C) (Oral) 18 1.651 m (5' 5\") 86.2 kg (190 lb)    Last Period Pulse Oximetry BMI (Body Mass Index)             02/20/2017 (Approximate) 98% 31.62 kg/m2         [facility] Information     [facility] gives you secure access to your electronic health record. If you see a primary care provider, you can also send messages to your care team and make appointments. If you have questions, please call your primary care clinic.  If you do not have a primary care provider, please call [phone] and they will assist you.        Care EveryWhere ID     This is your Care EveryWhere ID. This could be used by other organizations to access your [facility] medical records  [id number]        Equal Access to Services     LAZARO GOMEZ : Vinod Keller, waalexander west, qabridgett kaalmapat jain, caridad shoemaker . So [facility] [phone].    ATENCIÓN: Si habla español, tiene a collins disposición servicios gratuitos de asistencia lingüística. Llame al [phone].    We comply with applicable federal civil rights laws and [place] laws. We do not discriminate on the basis of race, color, national origin, age, disability, sex, sexual orientation, or gender identity.               Review of your medicines      CONTINUE these medicines which have NOT CHANGED        Dose / Directions    ferrous sulfate 325 (65 FE) MG tablet   Commonly known as:  IRON   Used for:  Encounter for supervision of other normal pregnancy in third trimester, Iron deficiency anemia secondary to inadequate dietary iron intake        Dose:  1 tablet   Take 1 tablet (325 mg) by mouth daily (with breakfast)   Quantity:  90 tablet   Refills:  3       prenatal multivitamin plus iron 27-0.8 MG Tabs per tablet   Used " for:  Pregnancy test positive        Dose:  1 tablet   Take 1 tablet by mouth daily   Quantity:  100 tablet   Refills:  3                Protect others around you: Learn how to safely use, store and throw away your medicines at www.disposemymeds.org.             Medication List: This is a list of all your medications and when to take them. Check marks below indicate your daily home schedule. Keep this list as a reference.      Medications           Morning Afternoon Evening Bedtime As Needed    ferrous sulfate 325 (65 FE) MG tablet   Commonly known as:  IRON   Take 1 tablet (325 mg) by mouth daily (with breakfast)                                prenatal multivitamin plus iron 27-0.8 MG Tabs per tablet   Take 1 tablet by mouth daily

## 2017-11-28 NOTE — IP AVS SNAPSHOT
[facility]    [address] [facility] [address]    Phone:  [phone]    Fax:  [phone]                                       After Visit Summary   11/28/2017    Rosette Aguilar    MRN: [id number]           After Visit Summary Signature Page     I have received my discharge instructions, and my questions have been answered. I have discussed any challenges I see with this plan with the nurse or doctor.    ..........................................................................................................................................  Patient/Patient Representative Signature      ..........................................................................................................................................  Patient Representative Print Name and Relationship to Patient    ..................................................               ................................................  Date                                            Time    ..........................................................................................................................................  Reviewed by Signature/Title    ...................................................              ..............................................  Date                                                            Time

## 2017-11-28 NOTE — PROVIDER NOTIFICATION
11/28/17 1128   Provider Notification   Provider Name/Title Dr. Church   Method of Notification Phone   Request Evaluate-Remote   Notification Reason Status Update   Dr. Church updated regarding urine specimen collected on admission results and being sent to culture-patient not complaining of any UTI symptoms, patient tachycardic, increased BP's, and marginal placenta abruption. MD believes urine specimen was most likely contaminated, will not treat at this time. Orders received for ALT, AST, CBC with platelets, uric acid and serum creatinine. Update MD if any lab results come back abnormal.

## 2017-11-28 NOTE — PLAN OF CARE
FHT's down to  with UC's with slow return to baseline of 125, UC's 2 min apart with short recovery time for fetus. Oxygen applied. Continued to push with good progress and delivery of boy at 0706.   Mom with extra bright red bleeding prior to complete, Dr. Hernández aware. QBL 350cc although some wash clothes saturated with blood during fundal exams. [place] Cytotec 800mg administered per order. Report given to Anum Cano RN, will monitor fundus/bleeding closely.

## 2017-11-29 LAB
AMPHETAMINES UR QL SCN: NEGATIVE
BACTERIA SPEC CULT: NORMAL
CANNABINOIDS UR QL: NEGATIVE
COCAINE UR QL: NEGATIVE
HGB BLD-MCNC: 10.7 G/DL (ref 11.7–15.7)
Lab: NORMAL
OPIATES UR QL SCN: NEGATIVE
PCP UR QL SCN: NEGATIVE
SPECIMEN SOURCE: NORMAL

## 2017-11-29 PROCEDURE — 12000027 ZZH R&B OB

## 2017-11-29 PROCEDURE — 85018 HEMOGLOBIN: CPT | Performed by: OBSTETRICS & GYNECOLOGY

## 2017-11-29 PROCEDURE — 40000083 ZZH STATISTIC IP LACTATION SERVICES 1-15 MIN

## 2017-11-29 PROCEDURE — 36415 COLL VENOUS BLD VENIPUNCTURE: CPT | Performed by: OBSTETRICS & GYNECOLOGY

## 2017-11-29 PROCEDURE — 80307 DRUG TEST PRSMV CHEM ANLYZR: CPT | Performed by: OBSTETRICS & GYNECOLOGY

## 2017-11-29 PROCEDURE — 25000132 ZZH RX MED GY IP 250 OP 250 PS 637: Performed by: OBSTETRICS & GYNECOLOGY

## 2017-11-29 RX ADMIN — IBUPROFEN 800 MG: 800 TABLET, FILM COATED ORAL at 02:48

## 2017-11-29 RX ADMIN — SENNOSIDES AND DOCUSATE SODIUM 1 TABLET: 8.6; 5 TABLET ORAL at 08:09

## 2017-11-29 RX ADMIN — ACETAMINOPHEN 650 MG: 325 TABLET, FILM COATED ORAL at 08:09

## 2017-11-29 RX ADMIN — IBUPROFEN 800 MG: 800 TABLET, FILM COATED ORAL at 10:00

## 2017-11-29 RX ADMIN — SENNOSIDES AND DOCUSATE SODIUM 1 TABLET: 8.6; 5 TABLET ORAL at 21:28

## 2017-11-29 RX ADMIN — IBUPROFEN 800 MG: 800 TABLET, FILM COATED ORAL at 18:04

## 2017-11-29 RX ADMIN — ACETAMINOPHEN 650 MG: 325 TABLET, FILM COATED ORAL at 21:51

## 2017-11-29 RX ADMIN — ACETAMINOPHEN 650 MG: 325 TABLET, FILM COATED ORAL at 18:03

## 2017-11-29 RX ADMIN — ACETAMINOPHEN 650 MG: 325 TABLET, FILM COATED ORAL at 14:07

## 2017-11-29 NOTE — L&D DELIVERY NOTE
OB Vaginal Delivery Note    Rosette Aguilar MRN# [id number]   Age: 32 year old YOB: 1985       GA: 38w4d  GP:   Labor Complications: Excessive Vaginal Bleeding   EBL:    mL  QBL: 350 mL  Delivery Type: Vaginal, Spontaneous Delivery   ROM to Delivery Time: 0h 39m  [place] Weight: 3.21 kg (7 lb 1.2 oz)    1 Minute 5 Minute 10 Minute   Apgar Totals: 9    10          ELENA MORGAN     Delivery Details:  Rosette Aguilar, a 32 year old  female delivered a viable infant with apgars of 9   and 10  . Patient was fully dilated and pushing after 3  hours 25  minutes in active labor. Delivery was via vaginal, spontaneous delivery  to a sterile field under epidural  anesthesia. Infant delivered in vertex     occiput  anterior  position. Anterior and posterior shoulders delivered without difficulty. The cord was clamped, cut twice and 3 vessels  were noted. Cord blood was obtained in routine fashion with the following disposition: lab .      Cord complications: none   Placenta delivered at   7:09 AM . Placental disposition was Pathology . Fundal massage performed and fundus found to be firm.     Episiotomy: none    Perineum, vagina, cervix were inspected, and no lacerations were noted:   Perineal lacerations: none                       Excellent hemostasis was noted. Needle count correct. Infant and patient in delivery room in good and stable condition.         Labor Event Times    Labor onset date:  17 Onset time:   3:30 AM   Dilation complete date:  17 Complete time:   6:55 AM   Start pushing date/time:  2017 0659            Labor Length    1st Stage (hrs):  3 (min):  25   2nd Stage (hrs):  0 (min):  11   3rd Stage (hrs):  0 (min):  3      Labor Events     labor?:  No    steroids:  None   Labor Type:  Spontaneous   Predominate monitoring during 1st stage:  continuous electronic fetal monitoring      Antibiotics received during labor?:  No     "  Rupture identifier:  Rupture 1   Rupture date/time: 17 0627   Rupture type:  Artificial Rupture of Membranes   Fluid color:  Clear      1:1 continuous labor support provided by?:  RN Labor partogram used?:  no         Delivery/Placenta Date and Time    Delivery Date:  17 Delivery Time:   7:06 AM   Placenta Date/Time:  2017  7:09 AM   Oxytocin given at the time of delivery:  after delivery of placenta      Vaginal Counts    Initial count performed by 2 team members:   Two Team Members   JIMENA Vasquez Dr.          Needles Suture [place] Sponges Instruments   Initial counts 2  5    Added to count       Final counts 2  5       Placed during labor Accounted for at the end of labor   NA NA   NA NA   NA NA      Final count performed by 2 team members:   Two Team Members   Elena Hernández         Final count correct?:  Yes         Apgars    Living status:  Living    1 Minute 5 Minute 10 Minute 15 Minute 20 Minute   Skin color: 1  2       Heart rate: 2  2       Reflex irritability: 2  2       Muscle tone: 2  2       Respiratory effort: 2  2       Total: 9  10          Apgars assigned by:  ELENA MORGAN RN      Cord    Vessels:  3 Vessels Complications:  None   Cord Blood Disposition:  Lab Gases Sent?:  No          Resuscitation    Methods:  None         [place] Measurements    Weight:  7 lb 1.2 oz Length:  1' 8\"   Head circumference:  33.7 cm       Skin to Skin and Feeding Plan    Skin to skin initiation date/time: 17    Skin to skin with:  Mother   Skin to skin end date/time:     How do you plan to feed your baby:  Breastfeeding, Formula      Labor Events and Shoulder Dystocia    Fetal Tracing Prior to Delivery:  Category 1   Fetal Tracing Comments:  small < 5% marginal placental abruption noted  i feel this may have accounted for the increased vaginal bleeding noted in the late 1st stage of labor  no evidence of fetal compromise   Shoulder dystocia present?:  Neg    "         Delivery (Maternal) (Provider to Complete) (259225)    Episiotomy:  None   Perineal lacerations:  None    Vaginal laceration?:  No    Cervical laceration?:  No          Mother's Information  Mother: Rosette Aguilar #[id number]    Start of Mother's Information     IO Blood Loss  11/28/17 0330 - 11/29/17 0706    Mom's I/O Activity            End of Mother's Information  Mother: Rosette Aguilar #[id number]            Delivery - Provider to Complete ([id number])    Delivering clinician:  JARVIS HERNÁNDEZ   Attempted Delivery Types (Choose all that apply):  Spontaneous Vaginal Delivery   Delivery Type (Choose the 1 that will go to the Birth History):  Vaginal, Spontaneous Delivery                     Other personnel:   Provider Role   ELENA MORGAN Delivery Nurse            Placenta    Delayed Cord Clamping:  Done   Date/Time:  11/28/2017  7:09 AM   Removal:  Spontaneous   Disposition:  Pathology      Anesthesia    Method:  Epidural   Cervical dilation at placement:  4-7         Presentation and Position    Presentation:  Vertex    Occiput Anterior                    Jarvis Hernández MD

## 2017-11-29 NOTE — PLAN OF CARE
Problem: Patient Care Overview  Goal: Plan of Care/Patient Progress Review  Outcome: Improving  Pt VSS. Ambulating. Tolerating regular diet. Pain controlled with Ibuprofen and Tylenol. T pump is provided for abd cramping. PT is using tucks pads. Breastfeeding independently, bonding well with infant. Participating in infant cares. IV line is removed. Baby was taken to nursery so mother can take some rest.

## 2017-11-29 NOTE — PLAN OF CARE
Problem: Patient Care Overview  Goal: Plan of Care/Patient Progress Review  Outcome: No Change  Blood pressure elevated x 1 this shift. Re-check was done and blood pressure was lower. Motrin and Tylenol for pain with reported decrease. Independent with self and baby cares. Monitor.

## 2017-11-29 NOTE — PROVIDER NOTIFICATION
11/29/17 1000   Provider Notification   Provider Name/Title Dr. Torres   Method of Notification In Department   Request Evaluate in Person   Notification Reason Other  (Updated MD on AM B/P and WNL after laying on left side)

## 2017-11-29 NOTE — ANESTHESIA POSTPROCEDURE EVALUATION
Patient: Rosette Aguilar    * No procedures listed *    Diagnosis:* No pre-op diagnosis entered *  Diagnosis Additional Information: Pain  [place]    Anesthesia Type:  Epidural    Note:  Anesthesia Post Evaluation         Comments:     S/P epidural for labor.   I or my partner was immediately available for management of this patient during epidural analgesia infusion.  VSS.  Doing well. Block resolved.  Neuro at baseline. Denies positional headache. Minimal side effects easily managed w/ PRN meds. No apparent anesthetic complications. No follow-up required.    Rodrick Gongora MD        Last vitals:  Vitals:    11/29/17 0550 11/29/17 0821 11/29/17 0852   BP: 129/70 (!) 140/92 116/68   Pulse: 83     Resp:      Temp:  98  F (36.7  C)    SpO2:            Electronically Signed By: Rodrick Gongora MD  November 29, 2017  9:33 AM

## 2017-11-29 NOTE — PROGRESS NOTES
[facility] Post-Partum Progress Note          Assessment and Plan:    Assessment:   Post-partum day #1  Normal spontaneous vaginal delivery  L&D complications: None      Doing well.      Plan:   Anticipate discharge tomorrow           Interval History:   Doing well.  Pain is well-controlled.  No fevers.  No history of foul-smelling vaginal discharge.  Good appetite.  Denies chest pain, shortness of breath, nausea or vomiting.  Vaginal bleeding is similar to a heavy menstrual flow.  Ambulatory.  Breastfeeding well.            Significant Problems:    None          Review of Systems:    The patient denies any chest pain, shortness of breath, excessive pain, fever, chills, purulent drainage from the wound, nausea or vomiting.          Medications:   All medications related to the patient's surgery have been reviewed          Physical Exam:     All vitals stable  Patient Vitals for the past 12 hrs:   BP Temp Temp src Pulse Heart Rate Resp   11/29/17 0852 116/68 - - - - -   11/29/17 0821 (!) 140/92 98  F (36.7  C) Oral - 89 -   11/29/17 0550 129/70 - - 83 - -   11/29/17 0549 116/80 - - 87 - -   11/29/17 0404 141/86 98  F (36.7  C) Oral 85 - 16     Uterine fundus is firm, non-tender and at the level of the umbilicus          Data:     All laboratory data related to this surgery reviewed  Hemoglobin   Date Value Ref Range Status   11/29/2017 10.7 (L) 11.7 - 15.7 g/dL Final   11/28/2017 11.7 11.7 - 15.7 g/dL Final   09/15/2017 10.0 (L) 11.7 - 15.7 g/dL Final   04/18/2017 12.6 11.7 - 15.7 g/dL Final     No imaging studies have been ordered  Dorian Torres MD, MD

## 2017-11-29 NOTE — LACTATION NOTE
LC follow up.  Patient has been nursing well and frequently.  She has no concerns.  LC encouraged her to continue exclusive breastfeeding.  She is aware she may call prn.

## 2017-11-29 NOTE — PLAN OF CARE
Problem: Patient Care Overview  Goal: Plan of Care/Patient Progress Review  Outcome: Improving  Patient meeting expected goals. Blood pressure is now WNL. Breastfeeding is going well. Bonding well with infant and performing all cares. Pain is being managed with Tylenol and Ibuprofen. Voiding without difficulty.

## 2017-11-30 ENCOUNTER — TELEPHONE (OUTPATIENT)
Dept: OBGYN | Facility: CLINIC | Age: 32
End: 2017-11-30

## 2017-11-30 VITALS
DIASTOLIC BLOOD PRESSURE: 72 MMHG | SYSTOLIC BLOOD PRESSURE: 129 MMHG | HEART RATE: 87 BPM | RESPIRATION RATE: 18 BRPM | HEIGHT: 65 IN | OXYGEN SATURATION: 98 % | TEMPERATURE: 97.8 F | BODY MASS INDEX: 31.65 KG/M2 | WEIGHT: 190 LBS

## 2017-11-30 LAB — COPATH REPORT: NORMAL

## 2017-11-30 PROCEDURE — 25000132 ZZH RX MED GY IP 250 OP 250 PS 637: Performed by: OBSTETRICS & GYNECOLOGY

## 2017-11-30 RX ADMIN — ACETAMINOPHEN 650 MG: 325 TABLET, FILM COATED ORAL at 08:49

## 2017-11-30 RX ADMIN — IBUPROFEN 800 MG: 800 TABLET, FILM COATED ORAL at 04:36

## 2017-11-30 RX ADMIN — IBUPROFEN 800 MG: 800 TABLET, FILM COATED ORAL at 11:26

## 2017-11-30 RX ADMIN — SENNOSIDES AND DOCUSATE SODIUM 1 TABLET: 8.6; 5 TABLET ORAL at 08:49

## 2017-11-30 NOTE — LETTER
[facility] [address], [address]  [place]  [address]  Telephone [phone]            Date: 11/30/2017      Name: Rosette Aguilar                       YOB: 1985        To Whom it May Concern:    Rosette Aguilar had a normal spontaneous vaginal delivery on 11/28/17 at [facility]     in [place], [place].    Claim # [id number]    Discharge date 11/30/17      Please let us know if you have further question.                  _________________________  TAna Hernández MD

## 2017-11-30 NOTE — PROGRESS NOTES
All discharge instructions were reviewed with patient by writer and all questions answered. Sent patient home with prescription for breast pump, as requested. Patient is gathering up belongings and will call out when ready to walk off floor.  Patient called out and was escorted off unit with all belongings and infant at 1214.

## 2017-11-30 NOTE — PLAN OF CARE
Problem: Patient Care Overview  Goal: Plan of Care/Patient Progress Review  Outcome: Improving  VSS, BP WNL. Pt ambulatory and independent with self cares and infant cares.  Breastfeeding well with minimal assistance. C.O nipple tenderness, using Mother Love cream. Tolerating regular diet well.  Pain managed well with ibuprofen and Tylenol.  Support person present and helpful to mom and with infant cares.  Anticipate discharge home tomorrow.

## 2017-11-30 NOTE — DISCHARGE SUMMARY
[facility]    Discharge Summary  Obstetrics    Date of Admission:  2017  Date of Discharge:  2017  Discharging Provider: Sharmila Ramirez    Discharge Diagnoses   Status post     History of Present Illness   Rosette Aguilar is a 32 year old female who presented with labor, had an uncomplicated delivery.    Hospital Course   The patient's hospital course was unremarkable.  She recovered as anticipated and experienced no post-delivery complications.  On discharge, her pain was well controlled. Vaginal bleeding is similar to peak menstrual flow.  Voiding without difficulty.  Ambulating well and tolerating a normal diet.  No fevers.  Breastfeeding and supplementing well.  Infant is stable.  She was discharged on post-partum day #2.    Post-partum hemoglobin:   Hemoglobin   Date Value Ref Range Status   2017 10.7 (L) 11.7 - 15.7 g/dL Final       Sharmila Ramirez MD    Discharge Disposition   Discharged to home   Condition at discharge: Stable    Primary Care Physician   Natali Izquierdo    Consultations This Hospital Stay   ANESTHESIOLOGY IP CONSULT  HOME CARE POST PARTUM/ IP CONSULT  LACTATION IP CONSULT    Discharge Orders   No discharge procedures on file.  Discharge Medications   Current Discharge Medication List      CONTINUE these medications which have NOT CHANGED    Details   ferrous sulfate (IRON) 325 (65 FE) MG tablet Take 1 tablet (325 mg) by mouth daily (with breakfast)  Qty: 90 tablet, Refills: 3    Associated Diagnoses: Encounter for supervision of other normal pregnancy in third trimester; Iron deficiency anemia secondary to inadequate dietary iron intake      Prenatal Vit-Fe Fumarate-FA (PRENATAL MULTIVITAMIN  PLUS IRON) 27-0.8 MG TABS per tablet Take 1 tablet by mouth daily  Qty: 100 tablet, Refills: 3    Associated Diagnoses: Pregnancy test positive           Allergies   No Known Allergies

## 2017-11-30 NOTE — DISCHARGE INSTRUCTIONS
Postpartum pain control:    You will likely experience cramping for 1-2 weeks.   You can take up to 3 tabs of ibuprofen (600mg) every 6 hours as needed for pain.  You can additionally take 1-2 tabs of tylenol (325-650mg regular strength 500-1000mg extra strength), every 6 hours for additional pain control as needed.  It is best to alternate your medications so you are taking something every 3 hours if you are having continued pain.    If you have vaginal pain you can take sitz baths 1-2x/day. Fill the tub with 2-3 inches of warm water and soak the perineal and vaginal area for 10 minutes. Ice or warm packs can also be applied for comfort.    Please call the office for:  Temperature above 100.4  Severe headache, especially with changes in your vision  Heavy bleeding (more than one pad an hour for more than 2 hours in a row)  Any other new, concerning symptoms.    Constipation  You may use the following products to ease constipation:    1. Stool softeners such as metamucil or benefiber  2. senna 1-2 times daily  3. Fiber supplements  4. miralax (over the counter).  5. Dulcolax    Please be sure to keep adequately hydrated; 6-8 8oz glasses daily, more if needed to compensate for exercise, sweating, etc.      More specific dosing can be found below:    - Metamucil 28g daily PO with 8oz of water  - senna-docusate 8.6-50 MG per tablet PO 1 tablet, Oral, 2 TIMES DAILY, Start with 1 tablet PO BID, reduce to 1 tablet daily when having daily BMs. Stop for loose stools.  - docusate sodium (COLACE) capsule 100 mg, Oral, 2 TIMES DAILY, To prevent constipation. Hold for loose stools.  - bisacodyl (DULCOLAX) suppository 10 mg, Rectal, DAILY PRN, constipation, Hold for loose stools.       Please contact the clinic with any questions.  Please schedule a follow up appointment with your primary OB/Gyn provider in 6 weeks.   You can respond with Margin[employer] or call Delia at [phone].    Postpartum Vaginal Delivery  Instructions  Lactation phone number: [phone]  Follow up in 6 weeks for postpartum visit    Activity       Ask family and friends for help when you need it.    Do not place anything in your vagina for 6 weeks.    You are not restricted on other activities, but take it easy for a few weeks to allow your body to recover from delivery.  You are able to do any activities you feel up to that point.    No driving until you have stopped taking your pain medications (usually two weeks after delivery).     Call your health care provider if you have any of these symptoms:       Increased pain, swelling, redness, or fluid around your stiches from an episiotomy or perineal tear.    A fever above 100.4 F (38 C) with or without chills when placing a thermometer under your tongue.    You soak a sanitary pad with blood within 1 hour, or you see blood clots larger than a golf ball.    Bleeding that lasts more than 6 weeks.    Vaginal discharge that smells bad.    Severe pain, cramping or tenderness in your lower belly area.    A need to urinate more frequently (use the toilet more often), more urgently (use the toilet very quickly), or it burns when you urinate.    Nausea and vomiting.    Redness, swelling or pain around a vein in your leg.    Problems breastfeeding or a red or painful area on your breast.    Chest pain and cough or are gasping for air.    Problems coping with sadness, anxiety, or depression.  If you have any concerns about hurting yourself or the baby, call your provider immediately.     You have questions or concerns after you return home.     Keep your hands clean:  Always wash your hands before touching your perineal area and stitches.  This helps reduce your risk of infection.  If your hands aren't dirty, you may use an alcohol hand-rub to clean your hands. Keep your nails clean and short.

## 2017-11-30 NOTE — TELEPHONE ENCOUNTER
calling: states needs a letter:  Date and type of delivery, hospital,  Please fax to :  [phone]  Attn: Saida Sadler  Claim # [id number]  Please print letter that is saved, sign and fax.  Thank you    Frances Celis RN

## 2017-11-30 NOTE — PLAN OF CARE
Problem: Patient Care Overview  Goal: Plan of Care/Patient Progress Review  Outcome: No Change  Independent with self and baby cares. Motrin for discomfort with reported decrease.

## 2017-11-30 NOTE — PLAN OF CARE
Problem: Patient Care Overview  Goal: Plan of Care/Patient Progress Review  Outcome: Improving  Patient meeting expected goals. Is up independent in room, meeting all personal and infant needs. Is both breastfeeding infant, which is going well and also supplementing infant, by choice, with formula.  VSS. Pain is being managed with Tylenol and Ibuprofen. Patient is stable and will be ready for discharge later today. Patient is aware to follow up in 6 weeks for postpartum visit.

## 2017-11-30 NOTE — PROGRESS NOTES
Rosette, we can discuss this further when you come in for your 6 week postpartum visit  Jarvis Hernández M.D.

## 2018-01-18 ENCOUNTER — OFFICE VISIT (OUTPATIENT)
Dept: OBGYN | Facility: CLINIC | Age: 33
End: 2018-01-18
Payer: COMMERCIAL

## 2018-01-18 VITALS
HEIGHT: 65 IN | WEIGHT: 158.2 LBS | SYSTOLIC BLOOD PRESSURE: 110 MMHG | DIASTOLIC BLOOD PRESSURE: 76 MMHG | HEART RATE: 80 BPM | BODY MASS INDEX: 26.36 KG/M2

## 2018-01-18 DIAGNOSIS — Z30.011 ENCOUNTER FOR INITIAL PRESCRIPTION OF CONTRACEPTIVE PILLS: ICD-10-CM

## 2018-01-18 PROCEDURE — G0145 SCR C/V CYTO,THINLAYER,RESCR: HCPCS | Performed by: OBSTETRICS & GYNECOLOGY

## 2018-01-18 PROCEDURE — 87624 HPV HI-RISK TYP POOLED RSLT: CPT | Performed by: OBSTETRICS & GYNECOLOGY

## 2018-01-18 PROCEDURE — 99207 ZZC POST PARTUM EXAM: CPT | Performed by: OBSTETRICS & GYNECOLOGY

## 2018-01-18 RX ORDER — ACETAMINOPHEN AND CODEINE PHOSPHATE 120; 12 MG/5ML; MG/5ML
1 SOLUTION ORAL DAILY
Qty: 84 TABLET | Refills: 3 | Status: SHIPPED | OUTPATIENT
Start: 2018-01-18 | End: 2019-07-10

## 2018-01-18 ASSESSMENT — ANXIETY QUESTIONNAIRES
2. NOT BEING ABLE TO STOP OR CONTROL WORRYING: NOT AT ALL
1. FEELING NERVOUS, ANXIOUS, OR ON EDGE: NOT AT ALL
3. WORRYING TOO MUCH ABOUT DIFFERENT THINGS: NOT AT ALL
GAD7 TOTAL SCORE: 0
7. FEELING AFRAID AS IF SOMETHING AWFUL MIGHT HAPPEN: NOT AT ALL
5. BEING SO RESTLESS THAT IT IS HARD TO SIT STILL: NOT AT ALL
IF YOU CHECKED OFF ANY PROBLEMS ON THIS QUESTIONNAIRE, HOW DIFFICULT HAVE THESE PROBLEMS MADE IT FOR YOU TO DO YOUR WORK, TAKE CARE OF THINGS AT HOME, OR GET ALONG WITH OTHER PEOPLE: NOT DIFFICULT AT ALL
6. BECOMING EASILY ANNOYED OR IRRITABLE: NOT AT ALL

## 2018-01-18 ASSESSMENT — PATIENT HEALTH QUESTIONNAIRE - PHQ9
SUM OF ALL RESPONSES TO PHQ QUESTIONS 1-9: 0
5. POOR APPETITE OR OVEREATING: NOT AT ALL

## 2018-01-18 NOTE — PROGRESS NOTES
SUBJECTIVE: Rosette is here for a 6-week postpartum checkup.    Delivery date was 2017. She had a  of a viable boy, weight 7 pounds 1.2 oz., with no complications.  Since delivery, she has been breast feeding.  She has no signs of infection, bleeding or other complications.  She is not pregnant.  We discussed contraceptions and she has chosen mini pill / progesterone only pill or Patch.  She  has not had intercourse since delivery and complains of NA discomfort. Patient screened for postpartum depression and complaints are NEGATIVE. Screening has also been completed for intimate partner violence.    EXAM:  Today's Depression Rating was   PHQ-9 SCORE 2018   Total Score 0       GENERAL healthy, alert and no distress  NECK: no adenopathy, no asymmetry, masses, or scars, thyroid normal to palpation and trachea midline and normal to palpation  GI: no masses palpable, no organomegaly, soft, non-tender, symmetric and umbilicus normal  GYN PELVIC: NEGATIVE, normal external genitalia, normal urethral meatus, normal vaginal mucosa, normal cervix, normal adnexa, no masses or tenderness and uterus normal size and shape  PSYCH: NEGATIVE    ASSESSMENT:   Normal postpartum exam after .    PLAN:  Return as needed or at time of next expected pap, pelvic, or breast exam.

## 2018-01-19 ASSESSMENT — ANXIETY QUESTIONNAIRES: GAD7 TOTAL SCORE: 0

## 2018-01-23 LAB
COPATH REPORT: NORMAL
PAP: NORMAL

## 2018-01-25 LAB
FINAL DIAGNOSIS: NORMAL
HPV HR 12 DNA CVX QL NAA+PROBE: NEGATIVE
HPV16 DNA SPEC QL NAA+PROBE: NEGATIVE
HPV18 DNA SPEC QL NAA+PROBE: NEGATIVE
SPECIMEN DESCRIPTION: NORMAL
SPECIMEN SOURCE CVX/VAG CYTO: NORMAL

## 2019-07-10 ENCOUNTER — OFFICE VISIT (OUTPATIENT)
Dept: FAMILY MEDICINE | Facility: CLINIC | Age: 34
End: 2019-07-10
Payer: COMMERCIAL

## 2019-07-10 VITALS
WEIGHT: 166.3 LBS | HEIGHT: 63 IN | TEMPERATURE: 98.4 F | DIASTOLIC BLOOD PRESSURE: 72 MMHG | SYSTOLIC BLOOD PRESSURE: 124 MMHG | OXYGEN SATURATION: 98 % | BODY MASS INDEX: 29.46 KG/M2 | HEART RATE: 84 BPM

## 2019-07-10 DIAGNOSIS — R82.90 NONSPECIFIC FINDING ON EXAMINATION OF URINE: ICD-10-CM

## 2019-07-10 DIAGNOSIS — N12 PYELONEPHRITIS: Primary | ICD-10-CM

## 2019-07-10 LAB
ALBUMIN UR-MCNC: NEGATIVE MG/DL
APPEARANCE UR: ABNORMAL
BACTERIA #/AREA URNS HPF: ABNORMAL /HPF
BILIRUB UR QL STRIP: NEGATIVE
COLOR UR AUTO: YELLOW
GLUCOSE UR STRIP-MCNC: NEGATIVE MG/DL
HGB UR QL STRIP: ABNORMAL
KETONES UR STRIP-MCNC: NEGATIVE MG/DL
LEUKOCYTE ESTERASE UR QL STRIP: ABNORMAL
NITRATE UR QL: NEGATIVE
NON-SQ EPI CELLS #/AREA URNS LPF: ABNORMAL /LPF
PH UR STRIP: 6.5 PH (ref 5–7)
RBC #/AREA URNS AUTO: ABNORMAL /HPF
SOURCE: ABNORMAL
SP GR UR STRIP: 1.01 (ref 1–1.03)
UROBILINOGEN UR STRIP-ACNC: 0.2 EU/DL (ref 0.2–1)
WBC #/AREA URNS AUTO: ABNORMAL /HPF

## 2019-07-10 PROCEDURE — 99213 OFFICE O/P EST LOW 20 MIN: CPT | Performed by: PHYSICIAN ASSISTANT

## 2019-07-10 PROCEDURE — 81001 URINALYSIS AUTO W/SCOPE: CPT | Performed by: PHYSICIAN ASSISTANT

## 2019-07-10 PROCEDURE — 87086 URINE CULTURE/COLONY COUNT: CPT | Performed by: PHYSICIAN ASSISTANT

## 2019-07-10 RX ORDER — NORELGESTROMIN AND ETHINYL ESTRADIOL 35; 150 UG/MG; UG/MG
PATCH TRANSDERMAL
Qty: 9 PATCH | Refills: 3 | Status: CANCELLED | OUTPATIENT
Start: 2019-07-10

## 2019-07-10 RX ORDER — CIPROFLOXACIN 500 MG/1
500 TABLET, FILM COATED ORAL 2 TIMES DAILY
Qty: 14 TABLET | Refills: 0 | Status: SHIPPED | OUTPATIENT
Start: 2019-07-10 | End: 2019-07-17

## 2019-07-10 RX ORDER — SULFAMETHOXAZOLE/TRIMETHOPRIM 800-160 MG
1 TABLET ORAL 2 TIMES DAILY
Qty: 6 TABLET | Refills: 0 | Status: CANCELLED | OUTPATIENT
Start: 2019-07-10 | End: 2019-07-13

## 2019-07-10 ASSESSMENT — MIFFLIN-ST. JEOR: SCORE: 1418.46

## 2019-07-10 NOTE — PROGRESS NOTES
Subjective     Rosette Aguilar is a 33 year old female who presents to clinic today for the following health issues:    HPI   URINARY TRACT SYMPTOMS      Duration: 1 week    Description  dysuria, frequency, odor and back pain    Intensity:  moderate    Accompanying signs and symptoms:  Fever/chills: no   Flank pain no   Nausea and vomiting: no   Vaginal symptoms: none  Abdominal/Pelvic Pain: no     History  History of frequent UTI's: YES- with pregnancies  History of kidney stones: no   Sexually Active: YES  Possibility of pregnancy: No    Precipitating or alleviating factors: None    Therapies tried and outcome: AZO taken 3-4 days ago   Outcome: Helped some but symptoms returned    Patient is here today for evaluation of painful urination  Ongoing for about 1 week  No fever, chills, nausea or vomiting  No abdominal pain  She tried Azo with minimal relief  No blood in urine    Patient Active Problem List   Diagnosis     CARDIOVASCULAR SCREENING; LDL GOAL LESS THAN 160     Supervision of normal pregnancy     Indication for care in labor or delivery     Postpartum state     Past Surgical History:   Procedure Laterality Date     LASIK BILATERAL  2005       Social History     Tobacco Use     Smoking status: Never Smoker     Smokeless tobacco: Never Used   Substance Use Topics     Alcohol use: No     Alcohol/week: 0.0 oz     Family History   Problem Relation Age of Onset     Diabetes Maternal Grandmother      Cancer Maternal Grandmother         Ovarian     Family History Negative Paternal Grandfather      Family History Negative Paternal Grandmother      Family History Negative Mother      Prostate Cancer Maternal Grandfather      Colon Cancer Maternal Grandfather      Hyperlipidemia Father          Current Outpatient Medications   Medication Sig Dispense Refill     ciprofloxacin (CIPRO) 500 MG tablet Take 1 tablet (500 mg) by mouth 2 times daily for 7 days 14 tablet 0     Prenatal Vit-Fe Fumarate-FA (PRENATAL  "MULTIVITAMIN  PLUS IRON) 27-0.8 MG TABS per tablet Take 1 tablet by mouth daily 100 tablet 3     No Known Allergies    Reviewed and updated as needed this visit by Provider  Problems         Review of Systems   ROS COMP: Constitutional, HEENT, cardiovascular, pulmonary, gi and gu systems are negative, except as otherwise noted.      Objective    /72   Pulse 84   Temp 98.4  F (36.9  C) (Oral)   Ht 1.6 m (5' 3\")   Wt 75.4 kg (166 lb 4.8 oz)   SpO2 98%   BMI 29.46 kg/m    Body mass index is 29.46 kg/m .  Physical Exam   GENERAL: healthy, alert and no distress  NECK: no adenopathy, no asymmetry, masses, or scars and thyroid normal to palpation  RESP: lungs clear to auscultation - no rales, rhonchi or wheezes  CV: regular rate and rhythm, normal S1 S2, no S3 or S4, no murmur, click or rub, no peripheral edema and peripheral pulses strong  ABDOMEN: soft, nontender, no hepatosplenomegaly, no masses and bowel sounds normal, + CVA tenderness on right  MS: no gross musculoskeletal defects noted, no edema    Diagnostic Test Results:  Results for orders placed or performed in visit on 07/10/19 (from the past 24 hour(s))   UA reflex to Microscopic and Culture   Result Value Ref Range    Color Urine Yellow     Appearance Urine Slightly Cloudy     Glucose Urine Negative NEG^Negative mg/dL    Bilirubin Urine Negative NEG^Negative    Ketones Urine Negative NEG^Negative mg/dL    Specific Gravity Urine 1.010 1.003 - 1.035    Blood Urine Moderate (A) NEG^Negative    pH Urine 6.5 5.0 - 7.0 pH    Protein Albumin Urine Negative NEG^Negative mg/dL    Urobilinogen Urine 0.2 0.2 - 1.0 EU/dL    Nitrite Urine Negative NEG^Negative    Leukocyte Esterase Urine Large (A) NEG^Negative    Source Midstream Urine    Urine Microscopic   Result Value Ref Range    WBC Urine  (A) OTO5^0 - 5 /HPF    RBC Urine 2-5 (A) OTO2^O - 2 /HPF    Squamous Epithelial /LPF Urine Moderate (A) FEW^Few /LPF    Bacteria Urine Few (A) NEG^Negative /HPF "           Assessment & Plan     1. Pyelonephritis  New problem, UA today did show infection and the patient does have some right side CVA tenderness therefore will cover for early pyelonephritis with Cipro BID x 7 days.  Push fluids, UC sent out.  F/U if symptoms worsen or do not improve.  - UA reflex to Microscopic and Culture; Future  - UA reflex to Microscopic and Culture  - Urine Microscopic  - ciprofloxacin (CIPRO) 500 MG tablet; Take 1 tablet (500 mg) by mouth 2 times daily for 7 days  Dispense: 14 tablet; Refill: 0    2. Nonspecific finding on examination of urine  - Urine Culture Aerobic Bacterial    Risks, benefits and alternatives were discussed with patient. Agreeable to the plan of care.      Return in about 1 week (around 7/17/2019) for If symptoms worsen or fail to improve.    Lia St PA-C  [facility]

## 2019-07-11 LAB
BACTERIA SPEC CULT: NORMAL
SPECIMEN SOURCE: NORMAL

## 2020-03-02 ENCOUNTER — HEALTH MAINTENANCE LETTER (OUTPATIENT)
Age: 35
End: 2020-03-02

## 2020-11-04 ASSESSMENT — ENCOUNTER SYMPTOMS
MYALGIAS: 0
DIZZINESS: 0
BREAST MASS: 0
ABDOMINAL PAIN: 0
WEAKNESS: 0
FREQUENCY: 0
HEMATURIA: 0
FEVER: 0
SHORTNESS OF BREATH: 0
DIARRHEA: 0
HEADACHES: 0
CHILLS: 0
NERVOUS/ANXIOUS: 0
ARTHRALGIAS: 0
CONSTIPATION: 1
HEMATOCHEZIA: 0
PARESTHESIAS: 0
PALPITATIONS: 0
COUGH: 0
EYE PAIN: 0
SORE THROAT: 0
JOINT SWELLING: 0
NAUSEA: 0
HEARTBURN: 0
DYSURIA: 0

## 2020-11-05 ENCOUNTER — OFFICE VISIT (OUTPATIENT)
Dept: FAMILY MEDICINE | Facility: CLINIC | Age: 35
End: 2020-11-05
Payer: COMMERCIAL

## 2020-11-05 VITALS
SYSTOLIC BLOOD PRESSURE: 128 MMHG | DIASTOLIC BLOOD PRESSURE: 82 MMHG | HEIGHT: 63 IN | OXYGEN SATURATION: 99 % | RESPIRATION RATE: 14 BRPM | WEIGHT: 167.8 LBS | BODY MASS INDEX: 29.73 KG/M2 | TEMPERATURE: 98.1 F | HEART RATE: 88 BPM

## 2020-11-05 DIAGNOSIS — Z13.6 CARDIOVASCULAR SCREENING; LDL GOAL LESS THAN 160: ICD-10-CM

## 2020-11-05 DIAGNOSIS — Z23 NEED FOR PROPHYLACTIC VACCINATION AND INOCULATION AGAINST INFLUENZA: ICD-10-CM

## 2020-11-05 DIAGNOSIS — Z00.00 ROUTINE GENERAL MEDICAL EXAMINATION AT A HEALTH CARE FACILITY: Primary | ICD-10-CM

## 2020-11-05 PROCEDURE — 90686 IIV4 VACC NO PRSV 0.5 ML IM: CPT | Performed by: NURSE PRACTITIONER

## 2020-11-05 PROCEDURE — 90471 IMMUNIZATION ADMIN: CPT | Performed by: NURSE PRACTITIONER

## 2020-11-05 PROCEDURE — 99395 PREV VISIT EST AGE 18-39: CPT | Mod: 25 | Performed by: NURSE PRACTITIONER

## 2020-11-05 ASSESSMENT — ENCOUNTER SYMPTOMS
BREAST MASS: 0
FREQUENCY: 0
JOINT SWELLING: 0
FEVER: 0
NERVOUS/ANXIOUS: 0
PALPITATIONS: 0
ABDOMINAL PAIN: 0
HEMATURIA: 0
NAUSEA: 0
HEARTBURN: 0
WEAKNESS: 0
SORE THROAT: 0
COUGH: 0
CHILLS: 0
HEADACHES: 0
DIZZINESS: 0
DYSURIA: 0
HEMATOCHEZIA: 0
ARTHRALGIAS: 0
SHORTNESS OF BREATH: 0
MYALGIAS: 0
DIARRHEA: 0
PARESTHESIAS: 0
EYE PAIN: 0
CONSTIPATION: 1

## 2020-11-05 ASSESSMENT — MIFFLIN-ST. JEOR: SCORE: 1415.27

## 2020-11-05 NOTE — PROGRESS NOTES
SUBJECTIVE:   CC: Rosette Aguilar is an 35 year old woman who presents for preventive health visit.     Patient has been advised of split billing requirements and indicates understanding: Yes     Healthy Habits:     Getting at least 3 servings of Calcium per day:  NO    Bi-annual eye exam:  Yes    Dental care twice a year:  Yes    Sleep apnea or symptoms of sleep apnea:  None    Diet:  Regular (no restrictions), Low fat/cholesterol, Carbohydrate counting and Gluten-free/reduced    Frequency of exercise:  1 day/week    Duration of exercise:  Less than 15 minutes    Taking medications regularly:  Yes    Medication side effects:  Not applicable    PHQ-2 Total Score: 0    Additional concerns today:  No          Today's PHQ-2 Score:   PHQ-2 ( 1999 Pfizer) 11/4/2020   Q1: Little interest or pleasure in doing things 0   Q2: Feeling down, depressed or hopeless 0   PHQ-2 Score 0   Q1: Little interest or pleasure in doing things Not at all   Q2: Feeling down, depressed or hopeless Not at all   PHQ-2 Score 0       Abuse: Current or Past (Physical, Sexual or Emotional) - No  Do you feel safe in your environment? Yes        Social History     Tobacco Use     Smoking status: Never Smoker     Smokeless tobacco: Never Used   Substance Use Topics     Alcohol use: No     Alcohol/week: 0.0 standard drinks         Alcohol Use 11/4/2020   Prescreen: >3 drinks/day or >7 drinks/week? Yes   Prescreen: >3 drinks/day or >7 drinks/week? -   AUDIT SCORE  4       Reviewed orders with patient.  Reviewed health maintenance and updated orders accordingly - Yes  Patient Active Problem List   Diagnosis     CARDIOVASCULAR SCREENING; LDL GOAL LESS THAN 160     Supervision of normal pregnancy     Indication for care in labor or delivery     Postpartum state     Past Surgical History:   Procedure Laterality Date     LASIK BILATERAL  2005       Social History     Tobacco Use     Smoking status: Never Smoker     Smokeless tobacco: Never Used    Substance Use Topics     Alcohol use: No     Alcohol/week: 0.0 standard drinks     Family History   Problem Relation Age of Onset     Diabetes Maternal Grandmother      Cancer Maternal Grandmother         Ovarian     Family History Negative Paternal Grandfather      Family History Negative Paternal Grandmother      Family History Negative Mother      Prostate Cancer Maternal Grandfather      Colon Cancer Maternal Grandfather      Hyperlipidemia Father            Mammogram not appropriate for this patient based on age.    Pertinent mammograms are reviewed under the imaging tab.  History of abnormal Pap smear: NO - age 30-65 PAP every 5 years with negative HPV co-testing recommended  PAP / HPV Latest Ref Rng & Units 1/18/2018 10/1/2015   PAP - NIL NIL   HPV 16 DNA NEG:Negative Negative Negative   HPV 18 DNA NEG:Negative Negative Negative   OTHER HR HPV NEG:Negative Negative Negative     Reviewed and updated as needed this visit by clinical staff  Tobacco  Allergies  Meds   Med Hx  Surg Hx  Fam Hx  Soc Hx        Reviewed and updated as needed this visit by Provider                    Review of Systems   Constitutional: Negative for chills and fever.   HENT: Negative for congestion, ear pain, hearing loss and sore throat.    Eyes: Negative for pain and visual disturbance.   Respiratory: Negative for cough and shortness of breath.    Cardiovascular: Negative for chest pain, palpitations and peripheral edema.   Gastrointestinal: Positive for constipation. Negative for abdominal pain, diarrhea, heartburn, hematochezia and nausea.   Breasts:  Negative for tenderness, breast mass and discharge.   Genitourinary: Negative for dysuria, frequency, genital sores, hematuria, pelvic pain, urgency, vaginal bleeding and vaginal discharge.   Musculoskeletal: Negative for arthralgias, joint swelling and myalgias.   Skin: Negative for rash.   Neurological: Negative for dizziness, weakness, headaches and paresthesias.  "  Psychiatric/Behavioral: Negative for mood changes. The patient is not nervous/anxious.         OBJECTIVE:   /82 (BP Location: Right arm, Patient Position: Chair, Cuff Size: Adult Regular)   Pulse 88   Temp 98.1  F (36.7  C) (Oral)   Resp 14   Ht 1.6 m (5' 3\")   Wt 76.1 kg (167 lb 12.8 oz)   SpO2 99%   BMI 29.72 kg/m    Physical Exam  GENERAL: healthy, alert and no distress  EYES: Eyes grossly normal to inspection  HENT: ear canals and TM's normal, nose and mouth without ulcers or lesions  NECK: no adenopathy, no asymmetry, masses, or scars and thyroid normal to palpation  RESP: lungs clear to auscultation - no rales, rhonchi or wheezes  BREAST: normal without masses, tenderness or nipple discharge and no palpable axillary masses or adenopathy  CV: regular rate and rhythm, normal S1 S2, no S3 or S4, no murmur, click or rub, no peripheral edema and peripheral pulses strong  ABDOMEN: soft, nontender, no hepatosplenomegaly, no masses and bowel sounds normal  NEURO: Normal strength and tone, mentation intact and speech normal  PSYCH: mentation appears normal, affect normal/bright    Diagnostic Test Results:  Labs reviewed in Epic    ASSESSMENT/PLAN:   1. Routine general medical examination at a health care facility  - Comprehensive metabolic panel; Future    2. Need for prophylactic vaccination and inoculation against influenza  - INFLUENZA VACCINE IM > 6 MONTHS VALENT IIV4 [29988]    3. CARDIOVASCULAR SCREENING; LDL GOAL LESS THAN 160  - Lipid panel reflex to direct LDL Fasting; Future    Patient has been advised of split billing requirements and indicates understanding: Yes  COUNSELING:  Reviewed preventive health counseling, as reflected in patient instructions    Estimated body mass index is 29.72 kg/m  as calculated from the following:    Height as of this encounter: 1.6 m (5' 3\").    Weight as of this encounter: 76.1 kg (167 lb 12.8 oz).        She reports that she has never smoked. She has never " used smokeless tobacco.      Counseling Resources:  ATP IV Guidelines  Pooled Cohorts Equation Calculator  Breast Cancer Risk Calculator  BRCA-Related Cancer Risk Assessment: FHS-7 Tool  FRAX Risk Assessment  ICSI Preventive Guidelines  Dietary Guidelines for Americans, 2010  USDA's MyPlate  ASA Prophylaxis  Lung CA Screening    BRIAN Mack Ra, CNP  [facility]

## 2020-11-30 DIAGNOSIS — Z13.6 CARDIOVASCULAR SCREENING; LDL GOAL LESS THAN 160: ICD-10-CM

## 2020-11-30 DIAGNOSIS — Z00.00 ROUTINE GENERAL MEDICAL EXAMINATION AT A HEALTH CARE FACILITY: ICD-10-CM

## 2020-11-30 PROCEDURE — 80053 COMPREHEN METABOLIC PANEL: CPT | Performed by: NURSE PRACTITIONER

## 2020-11-30 PROCEDURE — 80061 LIPID PANEL: CPT | Performed by: NURSE PRACTITIONER

## 2020-11-30 PROCEDURE — 36415 COLL VENOUS BLD VENIPUNCTURE: CPT | Performed by: NURSE PRACTITIONER

## 2020-12-01 LAB
ALBUMIN SERPL-MCNC: 3.8 G/DL (ref 3.4–5)
ALP SERPL-CCNC: 84 U/L (ref 40–150)
ALT SERPL W P-5'-P-CCNC: 28 U/L (ref 0–50)
ANION GAP SERPL CALCULATED.3IONS-SCNC: 6 MMOL/L (ref 3–14)
AST SERPL W P-5'-P-CCNC: 22 U/L (ref 0–45)
BILIRUB SERPL-MCNC: 0.6 MG/DL (ref 0.2–1.3)
BUN SERPL-MCNC: 14 MG/DL (ref 7–30)
CALCIUM SERPL-MCNC: 9.1 MG/DL (ref 8.5–10.1)
CHLORIDE SERPL-SCNC: 107 MMOL/L (ref 94–109)
CHOLEST SERPL-MCNC: 195 MG/DL
CO2 SERPL-SCNC: 25 MMOL/L (ref 20–32)
CREAT SERPL-MCNC: 0.84 MG/DL (ref 0.52–1.04)
GFR SERPL CREATININE-BSD FRML MDRD: 89 ML/MIN/{1.73_M2}
GLUCOSE SERPL-MCNC: 99 MG/DL (ref 70–99)
HDLC SERPL-MCNC: 55 MG/DL
LDLC SERPL CALC-MCNC: 106 MG/DL
NONHDLC SERPL-MCNC: 140 MG/DL
POTASSIUM SERPL-SCNC: 4.2 MMOL/L (ref 3.4–5.3)
PROT SERPL-MCNC: 7.6 G/DL (ref 6.8–8.8)
SODIUM SERPL-SCNC: 138 MMOL/L (ref 133–144)
TRIGL SERPL-MCNC: 170 MG/DL

## 2021-01-06 ENCOUNTER — PRENATAL OFFICE VISIT (OUTPATIENT)
Dept: NURSING | Facility: CLINIC | Age: 36
End: 2021-01-06
Payer: COMMERCIAL

## 2021-01-06 DIAGNOSIS — Z34.80 PRENATAL CARE, SUBSEQUENT PREGNANCY, UNSPECIFIED TRIMESTER: Primary | ICD-10-CM

## 2021-01-06 PROCEDURE — 99207 PR NO CHARGE NURSE ONLY: CPT

## 2021-01-06 NOTE — PROGRESS NOTES
NPN nurse visit done over the phone. Pt will be given NPN folder and book at her upcoming appt.   Discussed optional screening available to assess chromosomal anomalies. Questions answered. Pt advised to call the clinic if she has any questions or concerns related to her pregnancy. Prenatal labs will be obtained at her upcoming appt. New prenatal visit scheduled on 1/18/21 with Dr Yu.    Unknown    Lab Results   Component Value Date    PAP NIL 01/18/2018           Patient supplied answers from flow sheet for:  Prenatal OB Questionnaire.  Past Medical History  Diabetes?: No  Hypertension : No  Heart disease, mitral valve prolapse or rheumatic fever?: No  An autoimmune disease such as lupus or rheumatoid arthritis?: No  Kidney disease or urinary tract infection?: No  Epilepsy, seizures or spells?: No  Migraine headaches?: No  A stroke or loss of function or sensation?: No  Any other neurological problems?: No  Have you ever been treated for depression?: No  Are you having problems with crying spells or loss of self-esteem?: No  Have you ever required psychiatric care?: No  Have you ever had hepatitis, liver disease or jaundice?: No  Have you been treated for blood clots in your veins, deep vein thromosis, inflammation in the veins, thrombosis, phlebitis, pulmonary embolism or varicosities?: No  Have you had excessive bleeding after surgery or dental work?: No  Do you bleed more than other women after a cut or scratch?: No  Do you have a history of anemia?: No  Have you ever had thyroid problems or taken thyroid medication?: No   Do you have any endocrine problems?: No  Have you ever been in a major accident or suffered serious trauma?: No  Within the last year, has anyone hit, slapped, kicked or otherwise hurt you?: No  In the last year, has anyone forced you to have sex when you didn't want to?: No    Past Medical History 2   Have you ever received a blood transfusion?: No  Would you refuse a blood transfusion if  a doctor judged it to be medically necessary?: No   If you answered Yes, would you rather die than receive a blood transfusion?: No  If you answered Yes, is this for Muslim reasons?: No  Does anyone in your home smoke?: No  Do you use tobacco products?: No  Do you drink beer, wine or hard liquor?: No  Do you use any of the following: marijuana, speed, cocaine, heroin, hallucinogens or other drugs?: No   Is your blood type Rh negative?: No  Have you ever had abnormal antibodies in your blood?: No  Have you ever had asthma?: No  Have you ever had tuberculosis?: No  Do you have any allergies to drugs or over-the-counter medications?: No  Allergies: Dust Mites, Aspartame, Ethanol, Venlafaxine, Hydrochloride, Sertraline: No  Have you had any breast problems?: No  Have you ever ?: No  Have you had any gynecological surgical procedures such as cervical conization, a LEEP procedure, laser treatment, cryosurgery of the cervix or a dilation and curettage, etc?: No  Have you ever had any other surgical procedures?: (!) Yes  Have you been hospitalized for a nonsurgical reason excluding normal delivery?: No  Have you ever had any anesthetic complications?: No  Have you ever had an abnormal pap smear?: No    Past Medical History (Continued)  Do you have a history of abnormalities of the uterus?: No  Did your mother take ARIK or any other hormones when she was pregnant with you?: No  Did it take you more than a year to become pregnant?: No  Have you ever been evaluated or treated for infertility?: No  Is there a history of medical problems in your family, which you feel may be important to this pregnancy?: No  Do you have any other problems we have not asked about which you feel may be important to this pregnancy?: No    Symptoms since last menstrual period  Do you have any of the following symptoms: abdominal pain, blood in stools or urine, chest pain, shortness of breath, coughing or vomiting up blood, your heart  racing or skipping beats, nausea and vomiting, pain on urination or vaginal discharge or bleed: No  Will the patient be 35 years old or older at the time of delivery?: (!) Yes    Has the patient, baby's father or anyone in either family had:  Thalassemia (Italian, Greek, Mediterranean or  background only) and an MCV result less than 80?: No  Neural tube defect such as meningomyelocele, spina bifida or anencephaly?: No  Congenital heart defect?: No  Down's Syndrome?: No  Sanchez-Sachs disease (Christianity, Cajun, Polish-[place])?: No  Sickle cell disease or trait ()?: No  Hemophilia or other inherited problems of blood?: No  Muscular dystrophy?: No  Cystic fibrosis?: No  Kayleigh's chorea?: No  Mental retardation/autism?: No  If yes, was the person tested for fragile X?: No  Any other inherited genetic or chromosomal disorder?: No  Maternal metabolic disorder (e.g Insulin-dependent diabetes, PKU)?: No  A child with birth defects not listed above?: No  Recurrent pregnancy loss or stillbirth?: No   Has the patient had any medications/street drugs/alcohol since her last menstrual period?: No  Does the patient or baby's father have any other genetic risks?: No    Infection History   Do you object to being tested for Hepatitis B?: No  Do you object to being tested for HIV?: No   Do you feel that you are at high risk for coming in contact with the AIDS virus?: No  Have you ever been treated for tuberculosis?: No  Have you ever had a positive skin test for tuberculosis?: No  Do you live with someone who has tuberculosis?: No  Have you ever been exposed to tuberculosis?: No  Do you have genital herpes?: No  Does your partner have genital herpes?: No  Have you had a viral illness since your last period?: No  Have you ever had gonorrhea, chlamydia, syphilis, venereal warts, trichomoniasis, pelvic inflammatory disease or any other sexually transmitted disease?: No  Do you know if you are a genital group B streptococcus  carrier?: No  Have you had chicken pox/varicella?: (!) Yes   Have you been vaccinated against chicken Pox?: No  Have you had any other infectious diseases?: No

## 2021-01-13 DIAGNOSIS — Z34.80 PRENATAL CARE, SUBSEQUENT PREGNANCY, UNSPECIFIED TRIMESTER: ICD-10-CM

## 2021-01-13 LAB
ABO + RH BLD: NORMAL
ABO + RH BLD: NORMAL
BLD GP AB SCN SERPL QL: NORMAL
BLOOD BANK CMNT PATIENT-IMP: NORMAL
ERYTHROCYTE [DISTWIDTH] IN BLOOD BY AUTOMATED COUNT: 14.6 % (ref 10–15)
HBV SURFACE AG SERPL QL IA: NONREACTIVE
HCT VFR BLD AUTO: 37.4 % (ref 35–47)
HGB BLD-MCNC: 12 G/DL (ref 11.7–15.7)
HIV 1+2 AB+HIV1 P24 AG SERPL QL IA: NONREACTIVE
MCH RBC QN AUTO: 29.9 PG (ref 26.5–33)
MCHC RBC AUTO-ENTMCNC: 32.1 G/DL (ref 31.5–36.5)
MCV RBC AUTO: 93 FL (ref 78–100)
PLATELET # BLD AUTO: 275 10E9/L (ref 150–450)
RBC # BLD AUTO: 4.01 10E12/L (ref 3.8–5.2)
SPECIMEN EXP DATE BLD: NORMAL
WBC # BLD AUTO: 9.1 10E9/L (ref 4–11)

## 2021-01-13 PROCEDURE — 86780 TREPONEMA PALLIDUM: CPT | Mod: 90 | Performed by: OBSTETRICS & GYNECOLOGY

## 2021-01-13 PROCEDURE — 86850 RBC ANTIBODY SCREEN: CPT | Performed by: OBSTETRICS & GYNECOLOGY

## 2021-01-13 PROCEDURE — 86900 BLOOD TYPING SEROLOGIC ABO: CPT | Performed by: OBSTETRICS & GYNECOLOGY

## 2021-01-13 PROCEDURE — 99000 SPECIMEN HANDLING OFFICE-LAB: CPT | Performed by: OBSTETRICS & GYNECOLOGY

## 2021-01-13 PROCEDURE — 36415 COLL VENOUS BLD VENIPUNCTURE: CPT | Performed by: OBSTETRICS & GYNECOLOGY

## 2021-01-13 PROCEDURE — 76817 TRANSVAGINAL US OBSTETRIC: CPT | Performed by: OBSTETRICS & GYNECOLOGY

## 2021-01-13 PROCEDURE — 85027 COMPLETE CBC AUTOMATED: CPT | Performed by: OBSTETRICS & GYNECOLOGY

## 2021-01-13 PROCEDURE — 87086 URINE CULTURE/COLONY COUNT: CPT | Performed by: OBSTETRICS & GYNECOLOGY

## 2021-01-13 PROCEDURE — 87340 HEPATITIS B SURFACE AG IA: CPT | Performed by: OBSTETRICS & GYNECOLOGY

## 2021-01-13 PROCEDURE — 87389 HIV-1 AG W/HIV-1&-2 AB AG IA: CPT | Performed by: OBSTETRICS & GYNECOLOGY

## 2021-01-13 PROCEDURE — 86762 RUBELLA ANTIBODY: CPT | Performed by: OBSTETRICS & GYNECOLOGY

## 2021-01-13 PROCEDURE — 76801 OB US < 14 WKS SINGLE FETUS: CPT | Performed by: OBSTETRICS & GYNECOLOGY

## 2021-01-13 PROCEDURE — 86901 BLOOD TYPING SEROLOGIC RH(D): CPT | Performed by: OBSTETRICS & GYNECOLOGY

## 2021-01-14 LAB
BACTERIA SPEC CULT: NORMAL
Lab: NORMAL
RUBV IGG SERPL IA-ACNC: 48 IU/ML
SPECIMEN SOURCE: NORMAL
T PALLIDUM AB SER QL: NONREACTIVE

## 2021-02-08 ENCOUNTER — TRANSCRIBE ORDERS (OUTPATIENT)
Dept: MATERNAL FETAL MEDICINE | Facility: CLINIC | Age: 36
End: 2021-02-08

## 2021-02-08 ENCOUNTER — PRENATAL OFFICE VISIT (OUTPATIENT)
Dept: OBGYN | Facility: CLINIC | Age: 36
End: 2021-02-08
Payer: COMMERCIAL

## 2021-02-08 VITALS — DIASTOLIC BLOOD PRESSURE: 80 MMHG | SYSTOLIC BLOOD PRESSURE: 126 MMHG | WEIGHT: 174 LBS | BODY MASS INDEX: 30.82 KG/M2

## 2021-02-08 DIAGNOSIS — Z11.3 SCREEN FOR STD (SEXUALLY TRANSMITTED DISEASE): ICD-10-CM

## 2021-02-08 DIAGNOSIS — O09.299 PREVIOUS GESTATIONAL DIABETES MELLITUS, ANTEPARTUM: ICD-10-CM

## 2021-02-08 DIAGNOSIS — O26.90 PREGNANCY RELATED CONDITION, ANTEPARTUM: Primary | ICD-10-CM

## 2021-02-08 DIAGNOSIS — Z12.4 SCREENING FOR MALIGNANT NEOPLASM OF CERVIX: ICD-10-CM

## 2021-02-08 DIAGNOSIS — O09.521 MULTIGRAVIDA OF ADVANCED MATERNAL AGE IN FIRST TRIMESTER: Primary | ICD-10-CM

## 2021-02-08 DIAGNOSIS — Z86.32 PREVIOUS GESTATIONAL DIABETES MELLITUS, ANTEPARTUM: ICD-10-CM

## 2021-02-08 PROBLEM — O09.529 AMA (ADVANCED MATERNAL AGE) MULTIGRAVIDA 35+: Status: ACTIVE | Noted: 2021-02-08

## 2021-02-08 PROBLEM — Z34.90 SUPERVISION OF NORMAL PREGNANCY: Status: RESOLVED | Noted: 2017-04-18 | Resolved: 2021-02-08

## 2021-02-08 PROCEDURE — 87624 HPV HI-RISK TYP POOLED RSLT: CPT | Performed by: OBSTETRICS & GYNECOLOGY

## 2021-02-08 PROCEDURE — 99207 PR FIRST OB VISIT: CPT | Performed by: OBSTETRICS & GYNECOLOGY

## 2021-02-08 PROCEDURE — 87491 CHLMYD TRACH DNA AMP PROBE: CPT | Performed by: OBSTETRICS & GYNECOLOGY

## 2021-02-08 PROCEDURE — 87591 N.GONORRHOEAE DNA AMP PROB: CPT | Performed by: OBSTETRICS & GYNECOLOGY

## 2021-02-08 PROCEDURE — G0145 SCR C/V CYTO,THINLAYER,RESCR: HCPCS | Performed by: OBSTETRICS & GYNECOLOGY

## 2021-02-08 NOTE — NURSING NOTE
"Chief Complaint   Patient presents with     Prenatal Care     10 weeks 2 days- no concerns        Initial /80 (BP Location: Right arm, Patient Position: Sitting, Cuff Size: Adult Regular)   Wt 78.9 kg (174 lb)   LMP 2020   BMI 30.82 kg/m   Estimated body mass index is 30.82 kg/m  as calculated from the following:    Height as of 20: 1.6 m (5' 3\").    Weight as of this encounter: 78.9 kg (174 lb).  BP completed using cuff size: large    Questioned patient about current smoking habits.  Pt. has never smoked.          The following HM Due: NONE    10w2d  Long Leggett CMA                "

## 2021-02-08 NOTE — PROGRESS NOTES
This is a 35 year old female patient,   who presents for her first obstetrical visit. This pregnancy is Planned, Desired.    EDC Sep 4, 2021 by Previous US which makes her 10w2d  today.  Her cycles are regular.  Her last menstrual period was normal but uncertain on actual day. Since her LMP, she has had no complaints.  She denies nausea, pelvic pain and vaginal bleeding. Ultrasound in the 1st trimester showed EDC consistent with dates by LMP.     OB History    Para Term  AB Living   4 3 3 0 0 3   SAB TAB Ectopic Multiple Live Births   0 0 0 0 3      # Outcome Date GA Lbr Jose/2nd Weight Sex Delivery Anes PTL Lv   4 Current            3 Term 17 38w4d 03:25 / 00:11 3.21 kg (7 lb 1.2 oz) M Vag-Spont EPI N EVENS      Complications: Excessive Vaginal Bleeding      Name: John      Apgar1: 9  Apgar5: 10   2 Term 07 41w0d  3.856 kg (8 lb 8 oz) M  None N EVENS      Name: Eddie   1 Term 04 40w0d  2.948 kg (6 lb 8 oz) M IVD EPI N EVENS      Complications: GDM (gestational diabetes mellitus)      Name: Ron       History of GDM: Yes - 1st preg (diet controlled), no GDM 2nd & 3rd pregs  PTL : No,  History of HTN in pregnancy: No,  Thrombocytopenia: No,  Shoulder dystocia: No,  Vacuum Extraction: No  PPH: Yes - 3rd preg   3rd of 4th degree laceration: No.   Other complications: No      Since her last LMP she denies use of alcohol, tobacco and street drugs.    HISTORY:  Past Medical History:   Diagnosis Date     History of gestational diabetes      Past Surgical History:   Procedure Laterality Date     LASIK BILATERAL       Family History   Problem Relation Age of Onset     Diabetes Maternal Grandmother      Cancer Maternal Grandmother         Ovarian     Family History Negative Paternal Grandfather      Family History Negative Paternal Grandmother      Family History Negative Mother      Prostate Cancer Maternal Grandfather      Colon Cancer Maternal Grandfather      Hyperlipidemia  Father      Social History     Socioeconomic History     Marital status:      Spouse name: None     Number of children: None     Years of education: None     Highest education level: None   Occupational History     None   Social Needs     Financial resource strain: None     Food insecurity     Worry: None     Inability: None     Transportation needs     Medical: None     Non-medical: None   Tobacco Use     Smoking status: Never Smoker     Smokeless tobacco: Never Used   Substance and Sexual Activity     Alcohol use: No     Alcohol/week: 0.0 standard drinks     Drug use: No     Sexual activity: Yes     Partners: Male     Birth control/protection: None   Lifestyle     Physical activity     Days per week: None     Minutes per session: None     Stress: None   Relationships     Social connections     Talks on phone: None     Gets together: None     Attends Scientologist service: None     Active member of club or organization: None     Attends meetings of clubs or organizations: None     Relationship status: None     Intimate partner violence     Fear of current or ex partner: None     Emotionally abused: None     Physically abused: None     Forced sexual activity: None   Other Topics Concern     Parent/sibling w/ CABG, MI or angioplasty before 65F 55M? No   Social History Narrative     None     Current Outpatient Medications   Medication Sig     Prenatal Vit-Fe Fumarate-FA (PRENATAL MULTIVITAMIN  PLUS IRON) 27-0.8 MG TABS per tablet Take 1 tablet by mouth daily     No current facility-administered medications for this visit.      No Known Allergies    Past medical, surgical, social and family history were reviewed and updated in EPIC.    ROS:   12 point review of systems negative other than symptoms noted below.    EXAM:  /80 (BP Location: Right arm, Patient Position: Sitting, Cuff Size: Adult Regular)   Wt 78.9 kg (174 lb)   LMP 11/28/2020 (Within Days)   BMI 30.82 kg/m     BMI: Body mass index is 30.82  kg/m .    EXAM:  Constitutional: Appearance: Well nourished, well developed alert, in no acute distress  Chest:  Respiratory Effort:  Breathing unlabored  Cardiovascular:Heart    Auscultation:  Regular rate, normal rhythm, no murmurs present  Gastrointestinal:  Abdominal Examination:  Abdomen nontender to palpation, tone normal without     rigidity or guarding, no masses present, umbilicus without lesions    Liver and speen:  No hepatomegaly present, liver nontender to palpation    Hernias:  No hernias present    FHTs auscultated at 163.  Skin:  General Inspection:  No rashes present, no lesions present, no areas of  discoloration.  Neurologic/Psychiatric:    Mental Status:  Oriented X3       Pelvis: External genitalia, Bartholin, urethral, and Caputa glands within normal limits. Urethra is without lesion and nontender to palpation. Bladder is nontender. On speculum exam, cervix is without lesion and vagina is normal without lesion or discharge. Pap smear/GC/Chlam obtained without incident.    ASSESSMENT:      ICD-10-CM    1. Multigravida of advanced maternal age in first trimester  O09.521 MAT FETAL MED CTR REFERRAL-PREGNANCY   2. Previous gestational diabetes mellitus, antepartum  O09.299     Z86.32    3. Screening for malignant neoplasm of cervix  Z12.4 Pap imaged thin layer screen with HPV - recommended age 30 - 65 years (select HPV order below)     HPV High Risk Types DNA Cervical   4. Screen for STD (sexually transmitted disease)  Z11.3 NEISSERIA GONORRHOEA PCR     CHLAMYDIA TRACHOMATIS PCR       PLAN:    Prenatal labs reviewed. She has no questions.    Pap/HPV done.  Next due in 3 years if normal.    GC/Chlam done.    Discussed options for screening for and diagnosis of chromosomal anomalies, including first trimester screen, noninvasive prenatal testing/cell-free fetal DNA testing, CVS/amniocentesis, quad screen, and ultrasound or comprehensive Level II US at 18-20 weeks. She is electing genetic counseling w/  MFM, and will need Level 2 U/S w/ MFM as well.    Reviewed early pregnancy education, diet, exercise, prenatal vitamins, intercourse. Reviewed the call schedule, labor and delivery, and the schedule of prenatal visits.    RTC 4 weeks. She is encouraged to call sooner with questions or concerns.      Orlando Yu MD  [facility]

## 2021-02-09 LAB
C TRACH DNA SPEC QL NAA+PROBE: NEGATIVE
N GONORRHOEA DNA SPEC QL NAA+PROBE: NEGATIVE
SPECIMEN SOURCE: NORMAL
SPECIMEN SOURCE: NORMAL

## 2021-02-10 LAB
COPATH REPORT: NORMAL
PAP: NORMAL

## 2021-03-09 ENCOUNTER — PRENATAL OFFICE VISIT (OUTPATIENT)
Dept: OBGYN | Facility: CLINIC | Age: 36
End: 2021-03-09
Payer: COMMERCIAL

## 2021-03-09 VITALS — DIASTOLIC BLOOD PRESSURE: 74 MMHG | BODY MASS INDEX: 31.5 KG/M2 | WEIGHT: 177.8 LBS | SYSTOLIC BLOOD PRESSURE: 118 MMHG

## 2021-03-09 DIAGNOSIS — O09.521 MULTIGRAVIDA OF ADVANCED MATERNAL AGE IN FIRST TRIMESTER: Primary | ICD-10-CM

## 2021-03-09 DIAGNOSIS — O09.299 PREVIOUS GESTATIONAL DIABETES MELLITUS, ANTEPARTUM: ICD-10-CM

## 2021-03-09 DIAGNOSIS — Z86.32 PREVIOUS GESTATIONAL DIABETES MELLITUS, ANTEPARTUM: ICD-10-CM

## 2021-03-09 PROCEDURE — 99207 PR PRENATAL VISIT: CPT | Performed by: OBSTETRICS & GYNECOLOGY

## 2021-03-09 NOTE — PROGRESS NOTES
PROBLEM LIST  LABS: Apos/RI    1. AMA: plans level II US.  2. History gestational diabetes 1st preg: plan early glucose tolerance test at 18-20 weeks.    Early GCT next visit. If normal, repeat at 26-28w.  US is scheduled already for MFM.    Sharmila Ramirez MD

## 2021-03-09 NOTE — NURSING NOTE
"Chief Complaint   Patient presents with     Prenatal Care     14 weeks 3 days, no questions or concerns. Feeling good. No c/o VB. Some minor cramping/stretching.       Initial /74   Wt 80.6 kg (177 lb 12.8 oz)   LMP 2020 (Within Days)   BMI 31.50 kg/m   Estimated body mass index is 31.5 kg/m  as calculated from the following:    Height as of 20: 1.6 m (5' 3\").    Weight as of this encounter: 80.6 kg (177 lb 12.8 oz).  BP completed using cuff size: regular    Questioned patient about current smoking habits.  Pt. has never smoked.          The following HM Due: NONE      Kriss Duncan CMA             "

## 2021-04-02 ENCOUNTER — PRE VISIT (OUTPATIENT)
Dept: MATERNAL FETAL MEDICINE | Facility: CLINIC | Age: 36
End: 2021-04-02

## 2021-04-06 ENCOUNTER — PRENATAL OFFICE VISIT (OUTPATIENT)
Dept: OBGYN | Facility: CLINIC | Age: 36
End: 2021-04-06
Payer: COMMERCIAL

## 2021-04-06 VITALS — SYSTOLIC BLOOD PRESSURE: 122 MMHG | BODY MASS INDEX: 32.24 KG/M2 | DIASTOLIC BLOOD PRESSURE: 74 MMHG | WEIGHT: 182 LBS

## 2021-04-06 DIAGNOSIS — Z86.32 HISTORY OF GESTATIONAL DIABETES MELLITUS (GDM) IN PRIOR PREGNANCY, CURRENTLY PREGNANT: ICD-10-CM

## 2021-04-06 DIAGNOSIS — R73.09 ABNORMAL GLUCOSE TOLERANCE TEST: ICD-10-CM

## 2021-04-06 DIAGNOSIS — O09.299 HISTORY OF GESTATIONAL DIABETES MELLITUS (GDM) IN PRIOR PREGNANCY, CURRENTLY PREGNANT: ICD-10-CM

## 2021-04-06 DIAGNOSIS — O09.529 ANTEPARTUM MULTIGRAVIDA OF ADVANCED MATERNAL AGE: Primary | ICD-10-CM

## 2021-04-06 LAB — GLUCOSE 1H P 50 G GLC PO SERPL-MCNC: 147 MG/DL (ref 60–129)

## 2021-04-06 PROCEDURE — 99207 PR PRENATAL VISIT: CPT | Performed by: OBSTETRICS & GYNECOLOGY

## 2021-04-06 PROCEDURE — 82950 GLUCOSE TEST: CPT | Performed by: OBSTETRICS & GYNECOLOGY

## 2021-04-06 PROCEDURE — 36415 COLL VENOUS BLD VENIPUNCTURE: CPT | Performed by: OBSTETRICS & GYNECOLOGY

## 2021-04-06 NOTE — PROGRESS NOTES
PROBLEM LIST  LABS: Apos/RI    1. AMA: plans level II US.  2. History gestational diabetes 1st preg: plan early glucose tolerance test at 18-20 weeks.    Early GCT today. If normal, repeat at 26-28w.  US is scheduled already for MFM--tomorrow.  Follow up with me in 4 weeks.    Sharmila Ramirez MD

## 2021-04-06 NOTE — NURSING NOTE
"Chief Complaint   Patient presents with     Prenatal Care       Initial /74   Wt 82.6 kg (182 lb)   LMP 2020 (Within Days)   Breastfeeding No   BMI 32.24 kg/m   Estimated body mass index is 32.24 kg/m  as calculated from the following:    Height as of 20: 1.6 m (5' 3\").    Weight as of this encounter: 82.6 kg (182 lb).  BP completed using cuff size: regular    Questioned patient about current smoking habits.  Pt. has never smoked.          18w3d  + FM noted   - concerns  Tess Lyn LPN               " fair balance

## 2021-04-07 ENCOUNTER — OFFICE VISIT (OUTPATIENT)
Dept: MATERNAL FETAL MEDICINE | Facility: CLINIC | Age: 36
End: 2021-04-07
Attending: OBSTETRICS & GYNECOLOGY
Payer: COMMERCIAL

## 2021-04-07 ENCOUNTER — HOSPITAL ENCOUNTER (OUTPATIENT)
Dept: ULTRASOUND IMAGING | Facility: CLINIC | Age: 36
End: 2021-04-07
Attending: OBSTETRICS & GYNECOLOGY
Payer: COMMERCIAL

## 2021-04-07 DIAGNOSIS — O26.90 PREGNANCY RELATED CONDITION, ANTEPARTUM: ICD-10-CM

## 2021-04-07 DIAGNOSIS — O09.529 AMA (ADVANCED MATERNAL AGE) MULTIGRAVIDA 35+, UNSPECIFIED TRIMESTER: Primary | ICD-10-CM

## 2021-04-07 PROCEDURE — 76811 OB US DETAILED SNGL FETUS: CPT

## 2021-04-07 PROCEDURE — 76811 OB US DETAILED SNGL FETUS: CPT | Mod: 26 | Performed by: OBSTETRICS & GYNECOLOGY

## 2021-04-07 NOTE — RESULT ENCOUNTER NOTE
Abnormal one hour glucose tolerance test; please order and help her schedule 3 hr glucose tolerance test.  Sharmila Ramirez MD

## 2021-04-08 NOTE — PROGRESS NOTES
Rosette Aguilar was seen for an ultrasound today at the [facility] center.      For the details of the ultrasound please see the report which can be found under the imaging tab.      Nayla Stewart MD  , OB/GYN  Maternal-Fetal Medicine  [email or web address]  [phone] (Main M Office)  [phone] or [phone] (for 24 hour MFM questions)  [phone] (Pager)

## 2021-05-04 ENCOUNTER — HOSPITAL ENCOUNTER (OUTPATIENT)
Dept: ULTRASOUND IMAGING | Facility: CLINIC | Age: 36
End: 2021-05-04
Attending: OBSTETRICS & GYNECOLOGY
Payer: COMMERCIAL

## 2021-05-04 ENCOUNTER — OFFICE VISIT (OUTPATIENT)
Dept: MATERNAL FETAL MEDICINE | Facility: CLINIC | Age: 36
End: 2021-05-04
Attending: OBSTETRICS & GYNECOLOGY
Payer: COMMERCIAL

## 2021-05-04 ENCOUNTER — PRENATAL OFFICE VISIT (OUTPATIENT)
Dept: OBGYN | Facility: CLINIC | Age: 36
End: 2021-05-04
Payer: COMMERCIAL

## 2021-05-04 VITALS — WEIGHT: 188 LBS | DIASTOLIC BLOOD PRESSURE: 70 MMHG | BODY MASS INDEX: 33.3 KG/M2 | SYSTOLIC BLOOD PRESSURE: 122 MMHG

## 2021-05-04 DIAGNOSIS — R73.09 ABNORMAL GLUCOSE TOLERANCE TEST: ICD-10-CM

## 2021-05-04 DIAGNOSIS — O09.299 HISTORY OF GESTATIONAL DIABETES MELLITUS (GDM) IN PRIOR PREGNANCY, CURRENTLY PREGNANT: ICD-10-CM

## 2021-05-04 DIAGNOSIS — Z86.32 HISTORY OF GESTATIONAL DIABETES MELLITUS (GDM) IN PRIOR PREGNANCY, CURRENTLY PREGNANT: ICD-10-CM

## 2021-05-04 DIAGNOSIS — O09.522 MULTIGRAVIDA OF ADVANCED MATERNAL AGE IN SECOND TRIMESTER: Primary | ICD-10-CM

## 2021-05-04 DIAGNOSIS — O09.529 ANTEPARTUM MULTIGRAVIDA OF ADVANCED MATERNAL AGE: Primary | ICD-10-CM

## 2021-05-04 PROCEDURE — 76816 OB US FOLLOW-UP PER FETUS: CPT | Mod: 26 | Performed by: OBSTETRICS & GYNECOLOGY

## 2021-05-04 PROCEDURE — 76816 OB US FOLLOW-UP PER FETUS: CPT

## 2021-05-04 PROCEDURE — 99207 PR PRENATAL VISIT: CPT | Performed by: OBSTETRICS & GYNECOLOGY

## 2021-05-04 NOTE — NURSING NOTE
"Chief Complaint   Patient presents with     Prenatal Care       Initial /70   Wt 85.3 kg (188 lb)   LMP 2020 (Within Days)   Breastfeeding No   BMI 33.30 kg/m   Estimated body mass index is 33.3 kg/m  as calculated from the following:    Height as of 20: 1.6 m (5' 3\").    Weight as of this encounter: 85.3 kg (188 lb).  BP completed using cuff size: regular    Questioned patient about current smoking habits.  Pt. has never smoked.          22w3d  + FM daily  + heartburn  - headache  - cramping or bleeding  Tess Lyn LPN               "

## 2021-05-04 NOTE — PROGRESS NOTES
PROBLEM LIST  LABS: Apos/RI    1. AMA: plans level II US.  2. History gestational diabetes 1st preg: plan early glucose tolerance test at 18-20 weeks.    Early GCT was high, 3 hr pending Friday. Discussed that if normal, repeat 3 hr only at 26-28w.  US today in [facility], see report (nl).  Follow up with me in 4 weeks.    Sharmila Ramirez MD

## 2021-05-04 NOTE — PROGRESS NOTES
Please see full imaging report from [employer] program under imaging tab.    Rain Barrett MD  Maternal Fetal Medicine

## 2021-05-07 DIAGNOSIS — R73.09 ABNORMAL GLUCOSE TOLERANCE TEST: ICD-10-CM

## 2021-05-07 LAB
GLUCOSE 1H P 100 G GLC PO SERPL-MCNC: 178 MG/DL (ref 60–179)
GLUCOSE 2H P 100 G GLC PO SERPL-MCNC: 149 MG/DL (ref 60–154)
GLUCOSE 3H P 100 G GLC PO SERPL-MCNC: 92 MG/DL (ref 60–139)
GLUCOSE P FAST SERPL-MCNC: 92 MG/DL (ref 60–94)

## 2021-05-07 PROCEDURE — 82952 GTT-ADDED SAMPLES: CPT | Performed by: OBSTETRICS & GYNECOLOGY

## 2021-05-07 PROCEDURE — 36415 COLL VENOUS BLD VENIPUNCTURE: CPT | Performed by: OBSTETRICS & GYNECOLOGY

## 2021-05-07 PROCEDURE — 82951 GLUCOSE TOLERANCE TEST (GTT): CPT | Performed by: OBSTETRICS & GYNECOLOGY

## 2021-06-01 ENCOUNTER — PRENATAL OFFICE VISIT (OUTPATIENT)
Dept: OBGYN | Facility: CLINIC | Age: 36
End: 2021-06-01
Payer: COMMERCIAL

## 2021-06-01 VITALS — BODY MASS INDEX: 33.3 KG/M2 | WEIGHT: 188 LBS | DIASTOLIC BLOOD PRESSURE: 62 MMHG | SYSTOLIC BLOOD PRESSURE: 112 MMHG

## 2021-06-01 DIAGNOSIS — O09.529 ANTEPARTUM MULTIGRAVIDA OF ADVANCED MATERNAL AGE: Primary | ICD-10-CM

## 2021-06-01 DIAGNOSIS — Z86.32 HISTORY OF GESTATIONAL DIABETES MELLITUS (GDM) IN PRIOR PREGNANCY, CURRENTLY PREGNANT: ICD-10-CM

## 2021-06-01 DIAGNOSIS — O09.299 HISTORY OF GESTATIONAL DIABETES MELLITUS (GDM) IN PRIOR PREGNANCY, CURRENTLY PREGNANT: ICD-10-CM

## 2021-06-01 PROCEDURE — 99207 PR PRENATAL VISIT: CPT | Performed by: OBSTETRICS & GYNECOLOGY

## 2021-06-01 NOTE — PROGRESS NOTES
PROBLEM LIST  LABS: Apos/RI    1. AMA: plans level II US.  2. History gestational diabetes 1st preg: plan early glucose tolerance test at 18-20 weeks.    Early GCT was high, 3 hr was normal Repeat 3 hr test in 1-2 weeks.  Normal level II.  Follow up with me in 2 weeks.    Sharmila Ramirez MD

## 2021-06-14 DIAGNOSIS — O09.299 HISTORY OF GESTATIONAL DIABETES MELLITUS (GDM) IN PRIOR PREGNANCY, CURRENTLY PREGNANT: ICD-10-CM

## 2021-06-14 DIAGNOSIS — Z86.32 HISTORY OF GESTATIONAL DIABETES MELLITUS (GDM) IN PRIOR PREGNANCY, CURRENTLY PREGNANT: ICD-10-CM

## 2021-06-14 PROCEDURE — 82952 GTT-ADDED SAMPLES: CPT | Performed by: OBSTETRICS & GYNECOLOGY

## 2021-06-14 PROCEDURE — 82951 GLUCOSE TOLERANCE TEST (GTT): CPT | Performed by: OBSTETRICS & GYNECOLOGY

## 2021-06-14 PROCEDURE — 36415 COLL VENOUS BLD VENIPUNCTURE: CPT | Performed by: OBSTETRICS & GYNECOLOGY

## 2021-06-15 LAB
GLUCOSE 1H P 100 G GLC PO SERPL-MCNC: 179 MG/DL (ref 60–179)
GLUCOSE 2H P 100 G GLC PO SERPL-MCNC: 158 MG/DL (ref 60–154)
GLUCOSE 3H P 100 G GLC PO SERPL-MCNC: 102 MG/DL (ref 60–139)
GLUCOSE P FAST SERPL-MCNC: 92 MG/DL (ref 60–94)

## 2021-06-29 ENCOUNTER — PRENATAL OFFICE VISIT (OUTPATIENT)
Dept: OBGYN | Facility: CLINIC | Age: 36
End: 2021-06-29
Payer: COMMERCIAL

## 2021-06-29 ENCOUNTER — ALLIED HEALTH/NURSE VISIT (OUTPATIENT)
Dept: NURSING | Facility: CLINIC | Age: 36
End: 2021-06-29
Payer: COMMERCIAL

## 2021-06-29 VITALS — DIASTOLIC BLOOD PRESSURE: 68 MMHG | WEIGHT: 192 LBS | SYSTOLIC BLOOD PRESSURE: 114 MMHG | BODY MASS INDEX: 34.01 KG/M2

## 2021-06-29 DIAGNOSIS — O09.529 ANTEPARTUM MULTIGRAVIDA OF ADVANCED MATERNAL AGE: Primary | ICD-10-CM

## 2021-06-29 DIAGNOSIS — Z23 NEED FOR VIRAL IMMUNIZATION: Primary | ICD-10-CM

## 2021-06-29 PROCEDURE — 99207 PR PRENATAL VISIT: CPT | Performed by: OBSTETRICS & GYNECOLOGY

## 2021-06-29 PROCEDURE — 0011A PR COVID VAC MODERNA 100 MCG/0.5 ML IM: CPT

## 2021-06-29 PROCEDURE — 91301 PR COVID VAC MODERNA 100 MCG/0.5 ML IM: CPT

## 2021-06-29 NOTE — NURSING NOTE
"Chief Complaint   Patient presents with     Prenatal Care       Initial /68   Wt 87.1 kg (192 lb)   LMP 2020 (Within Days)   Breastfeeding No   BMI 34.01 kg/m   Estimated body mass index is 34.01 kg/m  as calculated from the following:    Height as of 20: 1.6 m (5' 3\").    Weight as of this encounter: 87.1 kg (192 lb).  BP completed using cuff size: regular    Questioned patient about current smoking habits.  Pt. has never smoked.          30w3d  + FM daily  - cramping or bleeding  + heartburn  - headache  + mild edema  Tess Lyn LPN               "

## 2021-06-29 NOTE — PROGRESS NOTES
PROBLEM LIST  LABS: Apos/RI    1. AMA: plans level II US.  2. History gestational diabetes 1st preg: plan early glucose tolerance test at 18-20 weeks.    Early GCT was high, 3 hr was with one michael value. Discussed COVID vaccine, and she is planning to do this. Follow up with me in 2 weeks.    Sharmila Ramirez MD

## 2021-07-13 ENCOUNTER — PRENATAL OFFICE VISIT (OUTPATIENT)
Dept: OBGYN | Facility: CLINIC | Age: 36
End: 2021-07-13
Payer: COMMERCIAL

## 2021-07-13 VITALS — BODY MASS INDEX: 34.19 KG/M2 | WEIGHT: 193 LBS | DIASTOLIC BLOOD PRESSURE: 60 MMHG | SYSTOLIC BLOOD PRESSURE: 108 MMHG

## 2021-07-13 DIAGNOSIS — R73.09 ABNORMAL GLUCOSE TOLERANCE TEST: ICD-10-CM

## 2021-07-13 DIAGNOSIS — O09.529 ANTEPARTUM MULTIGRAVIDA OF ADVANCED MATERNAL AGE: ICD-10-CM

## 2021-07-13 DIAGNOSIS — O36.60X0 EXCESSIVE FETAL GROWTH AFFECTING MANAGEMENT OF PREGNANCY, ANTEPARTUM, SINGLE OR UNSPECIFIED FETUS: ICD-10-CM

## 2021-07-13 DIAGNOSIS — Z23 NEED FOR TDAP VACCINATION: Primary | ICD-10-CM

## 2021-07-13 PROCEDURE — 90471 IMMUNIZATION ADMIN: CPT | Performed by: OBSTETRICS & GYNECOLOGY

## 2021-07-13 PROCEDURE — 99207 PR PRENATAL VISIT: CPT | Performed by: OBSTETRICS & GYNECOLOGY

## 2021-07-13 PROCEDURE — 90715 TDAP VACCINE 7 YRS/> IM: CPT | Performed by: OBSTETRICS & GYNECOLOGY

## 2021-07-13 NOTE — NURSING NOTE
"Chief Complaint   Patient presents with     Prenatal Care       Initial /60   Wt 87.5 kg (193 lb)   LMP 2020 (Within Days)   Breastfeeding No   BMI 34.19 kg/m   Estimated body mass index is 34.19 kg/m  as calculated from the following:    Height as of 20: 1.6 m (5' 3\").    Weight as of this encounter: 87.5 kg (193 lb).  BP completed using cuff size: regular    Questioned patient about current smoking habits.  Pt. has never smoked.        32w3d  + FM daily  - cramping or bleeding  + heartburn  - headache  + mild edema  Tess Lyn LPN               "

## 2021-07-27 ENCOUNTER — IMMUNIZATION (OUTPATIENT)
Dept: NURSING | Facility: CLINIC | Age: 36
End: 2021-07-27
Attending: INTERNAL MEDICINE
Payer: COMMERCIAL

## 2021-07-27 DIAGNOSIS — Z23 NEED FOR VACCINATION: Primary | ICD-10-CM

## 2021-07-27 PROCEDURE — 99207 PR NO CHARGE NURSE ONLY: CPT

## 2021-07-27 PROCEDURE — 0012A PR COVID VAC MODERNA 100 MCG/0.5 ML IM: CPT

## 2021-07-27 PROCEDURE — 91301 PR COVID VAC MODERNA 100 MCG/0.5 ML IM: CPT

## 2021-07-28 ENCOUNTER — ANCILLARY PROCEDURE (OUTPATIENT)
Dept: ULTRASOUND IMAGING | Facility: CLINIC | Age: 36
End: 2021-07-28
Attending: OBSTETRICS & GYNECOLOGY
Payer: COMMERCIAL

## 2021-07-28 DIAGNOSIS — O36.60X0 EXCESSIVE FETAL GROWTH AFFECTING MANAGEMENT OF PREGNANCY, ANTEPARTUM, SINGLE OR UNSPECIFIED FETUS: ICD-10-CM

## 2021-07-28 PROCEDURE — 76816 OB US FOLLOW-UP PER FETUS: CPT | Performed by: OBSTETRICS & GYNECOLOGY

## 2021-07-30 ENCOUNTER — PRENATAL OFFICE VISIT (OUTPATIENT)
Dept: OBGYN | Facility: CLINIC | Age: 36
End: 2021-07-30
Payer: COMMERCIAL

## 2021-07-30 VITALS — SYSTOLIC BLOOD PRESSURE: 102 MMHG | BODY MASS INDEX: 34.54 KG/M2 | DIASTOLIC BLOOD PRESSURE: 70 MMHG | WEIGHT: 195 LBS

## 2021-07-30 DIAGNOSIS — O09.529 ANTEPARTUM MULTIGRAVIDA OF ADVANCED MATERNAL AGE: Primary | ICD-10-CM

## 2021-07-30 PROCEDURE — 99207 PR PRENATAL VISIT: CPT | Performed by: OBSTETRICS & GYNECOLOGY

## 2021-07-30 RX ORDER — BREAST PUMP
1 EACH MISCELLANEOUS DAILY PRN
Qty: 1 EACH | Refills: 0 | Status: SHIPPED | OUTPATIENT
Start: 2021-07-30

## 2021-07-30 NOTE — PROGRESS NOTES
PROBLEM LIST  LABS: Apos/RI    1. AMA: plans level II US.  2. History gestational diabetes 1st preg: early GCT high, normal 3 hr. Repeat 3 hr one elevated value (28w).  Growth US at 34 weeks within normal limits.  Good fetal movement, no concerns.   COVID vaccine done.  Follow up in 2 weeks.    Sharmila Ramirez MD

## 2021-07-30 NOTE — NURSING NOTE
"Chief Complaint   Patient presents with     Prenatal Care       Initial /70   Wt 88.5 kg (195 lb)   LMP 2020 (Within Days)   Breastfeeding No   BMI 34.54 kg/m   Estimated body mass index is 34.54 kg/m  as calculated from the following:    Height as of 20: 1.6 m (5' 3\").    Weight as of this encounter: 88.5 kg (195 lb).  BP completed using cuff size: regular    Questioned patient about current smoking habits.  Pt. has never smoked.          34w6d  + FM daily  + nl discharge  - bleeding  + fermín judge contractions  + HB  -ha  + edema in hands    Tess Lyn LPN               "

## 2021-08-17 ENCOUNTER — PRENATAL OFFICE VISIT (OUTPATIENT)
Dept: OBGYN | Facility: CLINIC | Age: 36
End: 2021-08-17
Payer: COMMERCIAL

## 2021-08-17 VITALS — BODY MASS INDEX: 35.43 KG/M2 | DIASTOLIC BLOOD PRESSURE: 80 MMHG | WEIGHT: 200 LBS | SYSTOLIC BLOOD PRESSURE: 120 MMHG

## 2021-08-17 DIAGNOSIS — O09.529 ANTEPARTUM MULTIGRAVIDA OF ADVANCED MATERNAL AGE: Primary | ICD-10-CM

## 2021-08-17 PROCEDURE — 87653 STREP B DNA AMP PROBE: CPT | Performed by: OBSTETRICS & GYNECOLOGY

## 2021-08-17 PROCEDURE — 99207 PR PRENATAL VISIT: CPT | Performed by: OBSTETRICS & GYNECOLOGY

## 2021-08-17 NOTE — NURSING NOTE
"Chief Complaint   Patient presents with     Prenatal Care       Initial /80   Wt 90.7 kg (200 lb)   LMP 2020 (Within Days)   Breastfeeding No   BMI 35.43 kg/m   Estimated body mass index is 35.43 kg/m  as calculated from the following:    Height as of 20: 1.6 m (5' 3\").    Weight as of this encounter: 90.7 kg (200 lb).  BP completed using cuff size: regular    Questioned patient about current smoking habits.  Pt. has never smoked.          37w3d  + FM daily  + fermín judge contractions  + discharge   - edema  + heartburn  - headache  Tess Lyn LPN               "

## 2021-08-17 NOTE — PROGRESS NOTES
PROBLEM LIST  LABS: Apos/RI    1. AMA: plans level II US.  2. History gestational diabetes 1st preg: early GCT high, normal 3 hr. Repeat 3 hr one elevated value (28w).  Growth US at 34 weeks within normal limits.  Good fetal movement, no concerns.   COVID vaccine done.  Follow up weekly until delivery. Signs and symptoms of labor reviewed, including when to call or come in for evaluation.     Sharmila Ramirez MD

## 2021-08-18 ENCOUNTER — NURSE TRIAGE (OUTPATIENT)
Dept: NURSING | Facility: CLINIC | Age: 36
End: 2021-08-18

## 2021-08-18 LAB
GP B STREP DNA SPEC QL NAA+PROBE: NEGATIVE
PATIENT PENICILLIN, AMOXICILLIN, CEPHALOSPORINS ALLERGY: NO

## 2021-08-19 NOTE — TELEPHONE ENCOUNTER
" calling for wife. Patient was seen yesterday and after check up she has been uncomfortable with a little bit of fluid and blood that looks old. Contractions yesterday and today more calm. Yesterday and today it has been brown. Yesterday had some fluid that filled one panty liner. Today the same. Patient states it is brown Contractions every hour for 3 hours then a break for 3 hours and then they return. The contractions last less than a minute.   Call to [facility] and spoke to Dr. Wolff for second level triage. [facility] Follow up in clinic tomorrow if symptoms persist. Call back with worsening symptoms.  Relayed recommendations to  and patient.   Joanne Agustin RN   21 11:47 PM  [facility] Nurse Advisor      Reason for Disposition    [1] Leakage of fluid from vagina AND [2] green or brown in color    Additional Information    Negative: Passed out (i.e., lost consciousness, collapsed and was not responding)    Negative: Shock suspected (e.g., cold/pale/clammy skin, too weak to stand, low BP, rapid pulse)    Negative: Difficult to awaken or acting confused (e.g., disoriented, slurred speech)    Negative: [1] SEVERE abdominal pain (e.g., excruciating) AND [2] constant AND [3] present > 1 hour    Negative: Severe bleeding (e.g., continuous red blood from vagina, or large blood clots)    Negative: Umbilical cord hanging out of the vagina (shiny, white, curled appearance, \"like telephone cord\")    Negative: Uncontrollable urge to push (i.e., feels like baby is coming out now)    Negative: Can see baby    Negative: Sounds like a life-threatening emergency to the triager    Negative: Pregnant < 37 weeks (i.e., )    Negative: [1] Uncertain delivery date AND [2] possibly pregnant < 37 weeks (i.e., )    Negative: [1] First baby (primipara) AND [2] contractions < 6 minutes apart  AND [3] present 2 hours    Negative: [1] History of prior delivery " (multipara) AND [2] contractions < 10 minutes apart AND [3] present 1 hour    Negative: [1] History of rapid prior delivery AND [2] contractions < 10 minutes apart    Protocols used: PREGNANCY - LABOR-A-AH

## 2021-08-25 ENCOUNTER — HOSPITAL ENCOUNTER (INPATIENT)
Facility: CLINIC | Age: 36
LOS: 1 days | Discharge: HOME | End: 2021-08-26
Attending: OBSTETRICS & GYNECOLOGY | Admitting: OBSTETRICS & GYNECOLOGY
Payer: COMMERCIAL

## 2021-08-25 ENCOUNTER — ANESTHESIA (OUTPATIENT)
Dept: OBGYN | Facility: CLINIC | Age: 36
End: 2021-08-25
Payer: COMMERCIAL

## 2021-08-25 ENCOUNTER — NURSE TRIAGE (OUTPATIENT)
Dept: NURSING | Facility: CLINIC | Age: 36
End: 2021-08-25

## 2021-08-25 ENCOUNTER — ANESTHESIA EVENT (OUTPATIENT)
Dept: OBGYN | Facility: CLINIC | Age: 36
End: 2021-08-25
Payer: COMMERCIAL

## 2021-08-25 LAB
ABO/RH(D): NORMAL
ANTIBODY SCREEN: NEGATIVE
HGB BLD-MCNC: 12.1 G/DL (ref 11.7–15.7)
SARS-COV-2 RNA RESP QL NAA+PROBE: NEGATIVE
SPECIMEN EXPIRATION DATE: NORMAL

## 2021-08-25 PROCEDURE — 00HU33Z INSERTION OF INFUSION DEVICE INTO SPINAL CANAL, PERCUTANEOUS APPROACH: ICD-10-PCS | Performed by: ANESTHESIOLOGY

## 2021-08-25 PROCEDURE — 250N000009 HC RX 250: Performed by: ANESTHESIOLOGY

## 2021-08-25 PROCEDURE — 250N000011 HC RX IP 250 OP 636: Performed by: ANESTHESIOLOGY

## 2021-08-25 PROCEDURE — 250N000011 HC RX IP 250 OP 636: Performed by: OBSTETRICS & GYNECOLOGY

## 2021-08-25 PROCEDURE — 258N000003 HC RX IP 258 OP 636: Performed by: OBSTETRICS & GYNECOLOGY

## 2021-08-25 PROCEDURE — 10907ZC DRAINAGE OF AMNIOTIC FLUID, THERAPEUTIC FROM PRODUCTS OF CONCEPTION, VIA NATURAL OR ARTIFICIAL OPENING: ICD-10-PCS | Performed by: OBSTETRICS & GYNECOLOGY

## 2021-08-25 PROCEDURE — 87635 SARS-COV-2 COVID-19 AMP PRB: CPT | Performed by: OBSTETRICS & GYNECOLOGY

## 2021-08-25 PROCEDURE — 86900 BLOOD TYPING SEROLOGIC ABO: CPT | Performed by: OBSTETRICS & GYNECOLOGY

## 2021-08-25 PROCEDURE — 250N000009 HC RX 250: Performed by: OBSTETRICS & GYNECOLOGY

## 2021-08-25 PROCEDURE — 120N000001 HC R&B MED SURG/OB

## 2021-08-25 PROCEDURE — 59400 OBSTETRICAL CARE: CPT | Performed by: OBSTETRICS & GYNECOLOGY

## 2021-08-25 PROCEDURE — 85018 HEMOGLOBIN: CPT | Performed by: OBSTETRICS & GYNECOLOGY

## 2021-08-25 PROCEDURE — 370N000003 HC ANESTHESIA WARD SERVICE

## 2021-08-25 PROCEDURE — 3E0R3BZ INTRODUCTION OF ANESTHETIC AGENT INTO SPINAL CANAL, PERCUTANEOUS APPROACH: ICD-10-PCS | Performed by: ANESTHESIOLOGY

## 2021-08-25 PROCEDURE — 250N000013 HC RX MED GY IP 250 OP 250 PS 637: Performed by: OBSTETRICS & GYNECOLOGY

## 2021-08-25 PROCEDURE — 722N000001 HC LABOR CARE VAGINAL DELIVERY SINGLE

## 2021-08-25 PROCEDURE — 86780 TREPONEMA PALLIDUM: CPT | Performed by: OBSTETRICS & GYNECOLOGY

## 2021-08-25 RX ORDER — CALCIUM CARBONATE 500 MG/1
1000 TABLET, CHEWABLE ORAL DAILY PRN
Status: DISCONTINUED | OUTPATIENT
Start: 2021-08-25 | End: 2021-08-27 | Stop reason: HOSPADM

## 2021-08-25 RX ORDER — MISOPROSTOL 200 UG/1
400 TABLET ORAL
Status: DISCONTINUED | OUTPATIENT
Start: 2021-08-25 | End: 2021-08-27 | Stop reason: HOSPADM

## 2021-08-25 RX ORDER — OXYTOCIN/0.9 % SODIUM CHLORIDE 30/500 ML
340 PLASTIC BAG, INJECTION (ML) INTRAVENOUS CONTINUOUS PRN
Status: DISCONTINUED | OUTPATIENT
Start: 2021-08-25 | End: 2021-08-27 | Stop reason: HOSPADM

## 2021-08-25 RX ORDER — FENTANYL CITRATE 50 UG/ML
50-100 INJECTION, SOLUTION INTRAMUSCULAR; INTRAVENOUS
Status: DISCONTINUED | OUTPATIENT
Start: 2021-08-25 | End: 2021-08-25

## 2021-08-25 RX ORDER — NALOXONE HYDROCHLORIDE 0.4 MG/ML
0.2 INJECTION, SOLUTION INTRAMUSCULAR; INTRAVENOUS; SUBCUTANEOUS
Status: DISCONTINUED | OUTPATIENT
Start: 2021-08-25 | End: 2021-08-25

## 2021-08-25 RX ORDER — PROCHLORPERAZINE 25 MG
25 SUPPOSITORY, RECTAL RECTAL EVERY 12 HOURS PRN
Status: DISCONTINUED | OUTPATIENT
Start: 2021-08-25 | End: 2021-08-25

## 2021-08-25 RX ORDER — KETOROLAC TROMETHAMINE 30 MG/ML
30 INJECTION, SOLUTION INTRAMUSCULAR; INTRAVENOUS
Status: DISCONTINUED | OUTPATIENT
Start: 2021-08-25 | End: 2021-08-25

## 2021-08-25 RX ORDER — CARBOPROST TROMETHAMINE 250 UG/ML
250 INJECTION, SOLUTION INTRAMUSCULAR
Status: DISCONTINUED | OUTPATIENT
Start: 2021-08-25 | End: 2021-08-25

## 2021-08-25 RX ORDER — MISOPROSTOL 200 UG/1
400 TABLET ORAL
Status: DISCONTINUED | OUTPATIENT
Start: 2021-08-25 | End: 2021-08-25

## 2021-08-25 RX ORDER — CARBOPROST TROMETHAMINE 250 UG/ML
250 INJECTION, SOLUTION INTRAMUSCULAR
Status: DISCONTINUED | OUTPATIENT
Start: 2021-08-25 | End: 2021-08-27 | Stop reason: HOSPADM

## 2021-08-25 RX ORDER — OXYTOCIN 10 [USP'U]/ML
10 INJECTION, SOLUTION INTRAMUSCULAR; INTRAVENOUS
Status: DISCONTINUED | OUTPATIENT
Start: 2021-08-25 | End: 2021-08-25

## 2021-08-25 RX ORDER — LIDOCAINE HYDROCHLORIDE AND EPINEPHRINE 15; 5 MG/ML; UG/ML
INJECTION, SOLUTION EPIDURAL PRN
Status: DISCONTINUED | OUTPATIENT
Start: 2021-08-25 | End: 2021-08-25

## 2021-08-25 RX ORDER — ONDANSETRON 2 MG/ML
4 INJECTION INTRAMUSCULAR; INTRAVENOUS EVERY 6 HOURS PRN
Status: DISCONTINUED | OUTPATIENT
Start: 2021-08-25 | End: 2021-08-25

## 2021-08-25 RX ORDER — OXYTOCIN/0.9 % SODIUM CHLORIDE 30/500 ML
340 PLASTIC BAG, INJECTION (ML) INTRAVENOUS CONTINUOUS PRN
Status: COMPLETED | OUTPATIENT
Start: 2021-08-25 | End: 2021-08-25

## 2021-08-25 RX ORDER — MODIFIED LANOLIN
OINTMENT (GRAM) TOPICAL
Status: DISCONTINUED | OUTPATIENT
Start: 2021-08-25 | End: 2021-08-27 | Stop reason: HOSPADM

## 2021-08-25 RX ORDER — BISACODYL 10 MG
10 SUPPOSITORY, RECTAL RECTAL DAILY PRN
Status: DISCONTINUED | OUTPATIENT
Start: 2021-08-25 | End: 2021-08-27 | Stop reason: HOSPADM

## 2021-08-25 RX ORDER — METOCLOPRAMIDE HYDROCHLORIDE 5 MG/ML
10 INJECTION INTRAMUSCULAR; INTRAVENOUS EVERY 6 HOURS PRN
Status: DISCONTINUED | OUTPATIENT
Start: 2021-08-25 | End: 2021-08-25

## 2021-08-25 RX ORDER — ACETAMINOPHEN 325 MG/1
650 TABLET ORAL EVERY 4 HOURS PRN
Status: DISCONTINUED | OUTPATIENT
Start: 2021-08-25 | End: 2021-08-27 | Stop reason: HOSPADM

## 2021-08-25 RX ORDER — METOCLOPRAMIDE 10 MG/1
10 TABLET ORAL EVERY 6 HOURS PRN
Status: DISCONTINUED | OUTPATIENT
Start: 2021-08-25 | End: 2021-08-25

## 2021-08-25 RX ORDER — IBUPROFEN 800 MG/1
800 TABLET, FILM COATED ORAL EVERY 6 HOURS PRN
Status: DISCONTINUED | OUTPATIENT
Start: 2021-08-25 | End: 2021-08-27 | Stop reason: HOSPADM

## 2021-08-25 RX ORDER — METHYLERGONOVINE MALEATE 0.2 MG/ML
200 INJECTION INTRAVENOUS
Status: DISCONTINUED | OUTPATIENT
Start: 2021-08-25 | End: 2021-08-27 | Stop reason: HOSPADM

## 2021-08-25 RX ORDER — NALOXONE HYDROCHLORIDE 0.4 MG/ML
0.4 INJECTION, SOLUTION INTRAMUSCULAR; INTRAVENOUS; SUBCUTANEOUS
Status: DISCONTINUED | OUTPATIENT
Start: 2021-08-25 | End: 2021-08-25

## 2021-08-25 RX ORDER — PROCHLORPERAZINE MALEATE 10 MG
10 TABLET ORAL EVERY 6 HOURS PRN
Status: DISCONTINUED | OUTPATIENT
Start: 2021-08-25 | End: 2021-08-25

## 2021-08-25 RX ORDER — HYDROCORTISONE 2.5 %
CREAM (GRAM) TOPICAL 3 TIMES DAILY PRN
Status: DISCONTINUED | OUTPATIENT
Start: 2021-08-25 | End: 2021-08-27 | Stop reason: HOSPADM

## 2021-08-25 RX ORDER — OXYTOCIN/0.9 % SODIUM CHLORIDE 30/500 ML
100-340 PLASTIC BAG, INJECTION (ML) INTRAVENOUS CONTINUOUS PRN
Status: DISCONTINUED | OUTPATIENT
Start: 2021-08-25 | End: 2021-08-25

## 2021-08-25 RX ORDER — NALBUPHINE HYDROCHLORIDE 10 MG/ML
2.5-5 INJECTION, SOLUTION INTRAMUSCULAR; INTRAVENOUS; SUBCUTANEOUS EVERY 6 HOURS PRN
Status: DISCONTINUED | OUTPATIENT
Start: 2021-08-25 | End: 2021-08-25

## 2021-08-25 RX ORDER — TRANEXAMIC ACID 10 MG/ML
1 INJECTION, SOLUTION INTRAVENOUS EVERY 30 MIN PRN
Status: DISCONTINUED | OUTPATIENT
Start: 2021-08-25 | End: 2021-08-25

## 2021-08-25 RX ORDER — ONDANSETRON 4 MG/1
4 TABLET, ORALLY DISINTEGRATING ORAL EVERY 6 HOURS PRN
Status: DISCONTINUED | OUTPATIENT
Start: 2021-08-25 | End: 2021-08-25

## 2021-08-25 RX ORDER — TRANEXAMIC ACID 10 MG/ML
1 INJECTION, SOLUTION INTRAVENOUS EVERY 30 MIN PRN
Status: DISCONTINUED | OUTPATIENT
Start: 2021-08-25 | End: 2021-08-27 | Stop reason: HOSPADM

## 2021-08-25 RX ORDER — DOCUSATE SODIUM 100 MG/1
100 CAPSULE, LIQUID FILLED ORAL DAILY
Status: DISCONTINUED | OUTPATIENT
Start: 2021-08-25 | End: 2021-08-27 | Stop reason: HOSPADM

## 2021-08-25 RX ORDER — CALCIUM CARBONATE 500 MG/1
1 TABLET, CHEWABLE ORAL 2 TIMES DAILY
COMMUNITY

## 2021-08-25 RX ORDER — MISOPROSTOL 200 UG/1
800 TABLET ORAL
Status: DISCONTINUED | OUTPATIENT
Start: 2021-08-25 | End: 2021-08-25

## 2021-08-25 RX ORDER — IBUPROFEN 600 MG/1
600 TABLET, FILM COATED ORAL
Status: DISCONTINUED | OUTPATIENT
Start: 2021-08-25 | End: 2021-08-25

## 2021-08-25 RX ORDER — MISOPROSTOL 200 UG/1
800 TABLET ORAL
Status: DISCONTINUED | OUTPATIENT
Start: 2021-08-25 | End: 2021-08-27 | Stop reason: HOSPADM

## 2021-08-25 RX ORDER — BUPIVACAINE HYDROCHLORIDE 2.5 MG/ML
INJECTION, SOLUTION EPIDURAL; INFILTRATION; INTRACAUDAL PRN
Status: DISCONTINUED | OUTPATIENT
Start: 2021-08-25 | End: 2021-08-25

## 2021-08-25 RX ORDER — OXYTOCIN 10 [USP'U]/ML
10 INJECTION, SOLUTION INTRAMUSCULAR; INTRAVENOUS
Status: DISCONTINUED | OUTPATIENT
Start: 2021-08-25 | End: 2021-08-27 | Stop reason: HOSPADM

## 2021-08-25 RX ORDER — EPHEDRINE SULFATE 50 MG/ML
5 INJECTION, SOLUTION INTRAMUSCULAR; INTRAVENOUS; SUBCUTANEOUS
Status: DISCONTINUED | OUTPATIENT
Start: 2021-08-25 | End: 2021-08-25

## 2021-08-25 RX ORDER — METHYLERGONOVINE MALEATE 0.2 MG/ML
200 INJECTION INTRAVENOUS
Status: DISCONTINUED | OUTPATIENT
Start: 2021-08-25 | End: 2021-08-25

## 2021-08-25 RX ADMIN — LIDOCAINE HYDROCHLORIDE,EPINEPHRINE BITARTRATE 2 ML: 15; .005 INJECTION, SOLUTION EPIDURAL; INFILTRATION; INTRACAUDAL; PERINEURAL at 12:04

## 2021-08-25 RX ADMIN — DOCUSATE SODIUM 100 MG: 100 CAPSULE, LIQUID FILLED ORAL at 17:37

## 2021-08-25 RX ADMIN — SODIUM CHLORIDE, POTASSIUM CHLORIDE, SODIUM LACTATE AND CALCIUM CHLORIDE 1000 ML: 600; 310; 30; 20 INJECTION, SOLUTION INTRAVENOUS at 11:22

## 2021-08-25 RX ADMIN — METHYLERGONOVINE MALEATE 200 MCG: 0.2 INJECTION, SOLUTION INTRAMUSCULAR; INTRAVENOUS at 14:46

## 2021-08-25 RX ADMIN — BUPIVACAINE HYDROCHLORIDE 5 ML: 2.5 INJECTION, SOLUTION EPIDURAL; INFILTRATION; INTRACAUDAL at 12:03

## 2021-08-25 RX ADMIN — ACETAMINOPHEN 650 MG: 325 TABLET, FILM COATED ORAL at 17:37

## 2021-08-25 RX ADMIN — IBUPROFEN 800 MG: 800 TABLET, FILM COATED ORAL at 21:26

## 2021-08-25 RX ADMIN — LIDOCAINE HYDROCHLORIDE,EPINEPHRINE BITARTRATE 3 ML: 15; .005 INJECTION, SOLUTION EPIDURAL; INFILTRATION; INTRACAUDAL; PERINEURAL at 12:01

## 2021-08-25 RX ADMIN — Medication: at 12:18

## 2021-08-25 RX ADMIN — ACETAMINOPHEN 650 MG: 325 TABLET, FILM COATED ORAL at 21:26

## 2021-08-25 RX ADMIN — Medication 340 ML/HR: at 14:38

## 2021-08-25 RX ADMIN — Medication 10 ML/HR: at 12:07

## 2021-08-25 RX ADMIN — CALCIUM CARBONATE (ANTACID) CHEW TAB 500 MG 1000 MG: 500 CHEW TAB at 15:58

## 2021-08-25 RX ADMIN — IBUPROFEN 600 MG: 600 TABLET, FILM COATED ORAL at 15:18

## 2021-08-25 ASSESSMENT — MIFFLIN-ST. JEOR: SCORE: 1572.19

## 2021-08-25 NOTE — PLAN OF CARE
Data: Vital signs within normal limits. Postpartum checks within normal limits - see flow record. Patient eating and drinking normally. Patient able to empty bladder independently and is up ambulating with assist of 1. No apparent signs of infection. Patient performing self cares, is able to care for infant and is breastfeeding every 2-3 hours. Perineum intact, using ice for discomfort. External hemorrhoids observed, patient states she did have hemorrhoids in pregnancy as well.   Action: Patient medicated with PRN tylenol during the shift for pain. See MAR. Patient education done, see flow record.  Response: Parents bonding well with baby. Father of the baby at bedside, supportive.  Plan: Continue current plan of care.

## 2021-08-25 NOTE — L&D DELIVERY NOTE
Delivery Summary    Rosette Aguilar MRN# [id number]   Age: 36 year old YOB: 1985     ASSESSMENT & PLAN: 35 yo  presented in spontaneous, active labor.  GBS neg.  COVID neg       Jeff, Male-Rosette [[id number]]    Labor Event Times    Labor onset date: 21 Onset time:  8:30 AM   Dilation complete date: 21 Complete time:  2:13 PM   Start pushing date/time: 2021 1420      Labor Length    1st Stage (hrs): 5 (min): 43   2nd Stage (hrs): 0 (min): 16   3rd Stage (hrs): 0 (min): 5      Rupture date/time: 21 1413   Rupture type: Artificial Rupture of Membranes  Fluid color: Clear  Fluid odor: Normal     1:1 continuous labor support provided by?: RN       Delivery/Placenta Date and Time    Delivery Date: 21 Delivery Time:  2:29 PM   Placenta Date/Time: 2021  2:35 PM  Oxytocin given at the time of delivery: after delivery of placenta  Delivering clinician: Hao Houston MD   Other personnel present at delivery:  Provider Role   Divya Velasquez RN          Vaginal Counts     Initial count performed by 2 team members:  Two Team Members   SEBASTIAN HOUSTON       [place] Suture Needles Sponges (RETIRED) Instruments   Initial counts 2  5    Added to count       Relief counts       Final counts 2  5          Placed during labor Accounted for at the end of labor   FSE NA    IUPC NA    Cervadil NA               Final count performed by 2 team members:  Two Team Members   erica houston      Final count correct?: Yes     Apgars    Living status: Living   1 Minute 5 Minute 10 Minute 15 Minute 20 Minute   Skin color: 0  1       Heart rate: 2  2       Reflex irritability: 2  2       Muscle tone: 2  2       Respiratory effort: 2  2       Total: 8  9       Apgars assigned by: ANDREW GARCIA RNC     Cord    Vessels: 3 Vessels    Cord Complications: Nuchal   Nuchal Intervention: clamped and cut         Nuchal cord description: tight nuchal cord         Cord Blood  "Disposition: Lab    Gases Sent?: No    Delayed cord clamping?: No       [place] Resuscitation    Methods: None  Output in Delivery Room: Voided, Stool     [place] Measurements    Weight: 7 lb 4.1 oz Length: 1' 10\"   Head circumference: 35.6 cm    Output in delivery room: Voided, Stool     Skin to Skin and Feeding Plan    Skin to skin initiation date/time: 1841    Skin to skin with: Mother  Skin to skin end date/time:        Delivery (Maternal) (Provider to Complete) ([id number])       Blood Loss  Mother: Rosette Aguilar #[id number]   Start of Mother's Information    Delivery Blood Loss  21 0830 - 21 1534    None           End of Mother's Information  Mother: Rosette Aguilar #[id number]          Delivery - Provider to Complete ([id number])    Delivering clinician: Hao Wolff MD  Attempted Delivery Types (Choose all that apply): Spontaneous Vaginal Delivery  Delivery Type (Choose the 1 that will go to the Birth History): Vaginal, Spontaneous                   Other personnel:  Provider Role   Divya Velasquez RN                 Placenta    Date/Time: 2021  2:35 PM  Removal: Spontaneous  Disposition: Hospital disposal           Anesthesia    Method: Epidural                       Hao Wolff MD  "

## 2021-08-25 NOTE — ANESTHESIA PROCEDURE NOTES
Epidural catheter Procedure Note  Pre-Procedure   Staff -        Anesthesiologist:  Humaira Billings MD       Performed By: anesthesiologist       Referred By: New       Location: OB       Pre-Anesthestic Checklist: patient identified, IV checked, risks and benefits discussed, informed consent, monitors and equipment checked, pre-op evaluation and at physician/surgeon's request  Timeout:       Correct Patient: Yes        Correct Procedure: Yes        Correct Site: Yes        Correct Position: Yes   Procedure Documentation  Procedure: epidural catheter       Patient Position: sitting       Skin prep: DuraPrep       Local skin infiltrated with 3 mL of 1% lidocaine.        Insertion Site: L3-4. (midline approach).       Technique: LORT saline        MARCELLA at 7 cm.       Needle Type: Touhy needle       Needle Gauge: 17.        Needle Length (Inches): 3.5        Catheter: 19 G.         Catheter threaded easily.         Threaded 12 cm at skin.         # of attempts: 1 and  # of redirects:  1    Assessment/Narrative         Paresthesias: No.     Test dose of 3 mL at.         Test dose negative, 3 minutes after injection, for signs of intravascular, subdural, or intrathecal injection.       Insertion/Infusion Method: LORT saline       Aspiration negative for Heme or CSF via Epidural Catheter.    Comments:  Procedure tolerated well without apparent complications. Initial MDA bolus of 5cc 0.25% bupivacaine and 8cc from pump given. Epidural infusion (0.125% bupi with fentanyl 2mcg/ml) started at 10 ml/hr with PCEA of 5 ml q15min with a max cumulative dose of 20 ml/hr. PCEA instructions given and use encouraged PRN. Epidural expectations again reviewed and questions answered. Patient hemodynamically stable.  Patient and epidural functionality to be reassessed later via vital sign flowsheet, nursing communication, and/or patient report.  Anesthesiologist immediately available for ongoing assessment and management.

## 2021-08-25 NOTE — PROVIDER NOTIFICATION
08/25/21 1142   Provider Notification   Provider Name/Title Dr. Wolff   Method of Notification In Department   Request Evaluate - Remote   Notification Reason Maternal Vital Sign Change   Dr. Wolff at nurse station and updated on elevated BPs [phone].  MD aware and no additional orders at this time.  Primary RN Krista updated.

## 2021-08-25 NOTE — PROVIDER NOTIFICATION
08/25/21 1120   Provider Notification   Provider Name/Title Dr. Wolff   Method of Notification In Department   Request Evaluate - Remote   Notification Reason Patient Arrived;Labor Status;Uterine Activity;SVE   Dr. Wolff at nurse station and updated on patient arrival, SVE 7/85/-2, FHR tracing broken but moderate variability observed, paul every 2-2.5 minutes, GBS negative. Intrapartum orders received. MD planning to go see patient and discuss plan of care.

## 2021-08-25 NOTE — PLAN OF CARE
Data: Rosette Aguilar transferred to 426 via wheelchair at 1655. Baby transferred via parent's arms.  Action: Receiving unit notified of transfer: Yes. Patient and family notified of room change. Report given to Humaira HESS at 1656. Belongings sent to receiving unit. Accompanied by Registered Nurse. Oriented patient to surroundings. Call light within reach. ID bands double-checked with Humaira HESS.  Response: Patient tolerated transfer and is stable. Fall risk band active.    Patients mobililty level scored using the bedside mobility assistance tool (BMAT). Patient is at a mobility level test number: 4. Mobility equipment used: none required. Required assist of 2 staff members. Further use of BMAT scoring required.

## 2021-08-25 NOTE — PLAN OF CARE
Pre delivery teaching including EES, Vit K, and Hepatitis vaccine education-risk/benifits.  Parents stated an understanding & consented to medications.    New Parent booklet discussed and questions answered.  Parents stated an understanding on how to use the resource including self care, infant care and the breast feeding log.

## 2021-08-25 NOTE — PROVIDER NOTIFICATION
08/25/21 1545   Provider Notification   Provider Name/Title    Method of Notification In Department   Request Evaluate - Remote   Notification Reason Maternal Vital Sign Change    in the department and updated on elevated blood pressure. 150/88 then recheck 141/83. No new orders received. Continue to monitor.

## 2021-08-25 NOTE — PLAN OF CARE
@ 38 4/7 weeks here in spontaneous labor with onset at 0630.  Patient stated that her frequency and intensity had increased since onset.    Patient states she has felt baby moving and denies LOF or VB.  VTX by [place] - EFM applied & explained.  SVE found cervical change from the last office visit.  Patient plan for pain management is to have an epidural.  IV was established and fluid bolus was started.  See Doc flow sheets for assessment.  MD notified and orders received.  MDA notified & epidural orders were requested.

## 2021-08-25 NOTE — PLAN OF CARE
Dr Wolff at the bedside to delivery infant.  CAN times 1 tight clamped and cut prior to delivery.  Apgars 8 & 9.

## 2021-08-25 NOTE — ANESTHESIA PREPROCEDURE EVALUATION
Anesthesia Pre-Procedure Evaluation    Patient: Rosette Aguilar   MRN: [id number] : 1985        Preoperative Diagnosis: * No pre-op diagnosis entered *   Procedure : * No procedures listed *     Past Medical History:   Diagnosis Date     History of gestational diabetes       Past Surgical History:   Procedure Laterality Date     LASIK BILATERAL        No Known Allergies   Social History     Tobacco Use     Smoking status: Never Smoker     Smokeless tobacco: Never Used   Substance Use Topics     Alcohol use: No     Alcohol/week: 0.0 standard drinks      Wt Readings from Last 1 Encounters:   21 90.7 kg (200 lb)        Anesthesia Evaluation            ROS/MED HX  ENT/Pulmonary:  - neg pulmonary ROS     Neurologic:  - neg neurologic ROS     Cardiovascular:  - neg cardiovascular ROS     METS/Exercise Tolerance:     Hematologic:       Musculoskeletal:   (+) kyphoscoliosis lumbar spine     GI/Hepatic:     (+) GERD,     Renal/Genitourinary:       Endo:     (+) Obesity,     Psychiatric/Substance Use:       Infectious Disease:       Malignancy:       Other:            Physical Exam    Airway        Mallampati: II   TM distance: > 3 FB   Neck ROM: full   Mouth opening: > 3 cm    Respiratory Devices and Support         Dental           Cardiovascular   cardiovascular exam normal          Pulmonary   pulmonary exam normal                OUTSIDE LABS:  CBC:   Lab Results   Component Value Date    WBC 9.1 2021    WBC 19.0 (H) 2017    HGB 12.0 2021    HGB 10.7 (L) 2017    HCT 37.4 2021    HCT 35.2 2017     2021     2017     BMP:   Lab Results   Component Value Date     2020    POTASSIUM 4.2 2020    CHLORIDE 107 2020    CO2 25 2020    BUN 14 2020    CR 0.84 2020    CR 0.57 2017    GLC 99 2020     COAGS: No results found for: PTT, INR, FIBR  POC: No results found for: BGM, HCG, HCGS  HEPATIC:    Lab Results   Component Value Date    ALBUMIN 3.8 11/30/2020    PROTTOTAL 7.6 11/30/2020    ALT 28 11/30/2020    AST 22 11/30/2020    ALKPHOS 84 11/30/2020    BILITOTAL 0.6 11/30/2020     OTHER:   Lab Results   Component Value Date    CARLITOS 9.1 11/30/2020       Anesthesia Plan    ASA Status:  2   NPO Status:  ELEVATED Aspiration Risk/Unknown    Anesthesia Type: Epidural.              Consents    Anesthesia Plan(s) and associated risks, benefits, and realistic alternatives discussed. Questions answered and patient/representative(s) expressed understanding.     - Discussed with:  Patient         Postoperative Care            Comments:    Continuous Labor Epidural: Indication is for labor pain. Following an discussion of the procedure, epidural expectations, and risks and benefits (risks including, but not limited to, nerve damage, infection, bleeding/hematoma, inadvertent dural puncture, spinal headache, partial or failed block), the patient appears to understand and consents to proceed. Questions were encouraged and answered.          neg OB ROS.       Humaira Billings MD

## 2021-08-25 NOTE — H&P
No significant change in general health status based on exam of the patient, review of Nursing database and prenatal.     35 yo  at 38w4d presents in spontaneous active labor.  Ctx began at 0600 and have steadily increased in frequency and intensity.    On arrival pt /-2 and uncomfortable, requesting epidural.  BOWI.  GBS neg.    Monitor progress  Anticipate     Luiz Wolff MD

## 2021-08-25 NOTE — TELEPHONE ENCOUNTER
" reporting,   Wife is feeling contractions every 7-10 minutes and last 1-2 minutes and getting stronger.    Started at 6 AM this morning.   It is there fourth baby and a boy.      Sent Pt and  to [facility].    Called Charge Nurse on the labor and delivery unit to let them know the Pt was coming to the Unit.     Daphnie Garcia RN  Central Triage Red Flags/Med Refills      Reason for Disposition    Having contractions or other symptoms of labor (such as vaginal pressure) and > 36 weeks pregnant (i.e., term pregnancy)    MODERATE-SEVERE abdominal pain    Additional Information    Negative: Passed out (i.e., fainted, collapsed and was not responding)    Negative: Shock suspected (e.g., cold/pale/clammy skin, too weak to stand, low BP, rapid pulse)    Negative: Difficult to awaken or acting confused (e.g., disoriented, slurred speech)    Negative: SEVERE abdominal pain (e.g., excruciating), constant, and present > 1 hour    Negative: SEVERE vaginal bleeding (e.g., continuous red blood from vagina, large blood clots)    Negative: Sounds like a life-threatening emergency to the triager    Negative: Passed out (i.e., fainted, collapsed and was not responding)    Negative: Shock suspected (e.g., cold/pale/clammy skin, too weak to stand, low BP, rapid pulse)    Negative: Difficult to awaken or acting confused (e.g., disoriented, slurred speech)    Negative: SEVERE constant abdominal pain (e.g., excruciating) and present > 1 hour    Negative: SEVERE bleeding (e.g., continuous red blood from vagina, or large blood clots)    Negative: Umbilical cord hanging out of the vagina (shiny, white, curled appearance, \"like telephone cord\")    Negative: Uncontrollable urge to push (i.e., feels like baby is coming out now)    Negative: Can see baby    Negative: Sounds like a life-threatening emergency to the triager    Negative: Pregnant < 37 weeks (i.e., )    Negative: Uncertain delivery date " and possibly pregnant < 37 weeks (i.e., )    Protocols used: PREGNANCY - ABDOMINAL PAIN GREATER THAN 20 WEEKS EGA-A-OH, PREGNANCY - LABOR-A-OH

## 2021-08-25 NOTE — PROVIDER NOTIFICATION
08/25/21 1425   Provider Notification   Provider Name/Title sabal    Method of Notification In Department   Request Attend Delivery

## 2021-08-26 VITALS
WEIGHT: 200 LBS | DIASTOLIC BLOOD PRESSURE: 79 MMHG | OXYGEN SATURATION: 97 % | HEART RATE: 97 BPM | HEIGHT: 64 IN | RESPIRATION RATE: 16 BRPM | TEMPERATURE: 97.8 F | SYSTOLIC BLOOD PRESSURE: 130 MMHG | BODY MASS INDEX: 34.15 KG/M2

## 2021-08-26 LAB — T PALLIDUM AB SER QL: NONREACTIVE

## 2021-08-26 PROCEDURE — 120N000001 HC R&B MED SURG/OB

## 2021-08-26 PROCEDURE — 250N000013 HC RX MED GY IP 250 OP 250 PS 637: Performed by: OBSTETRICS & GYNECOLOGY

## 2021-08-26 RX ORDER — ACETAMINOPHEN 500 MG
500-1000 TABLET ORAL EVERY 6 HOURS PRN
Qty: 30 TABLET | Refills: 0 | Status: SHIPPED | OUTPATIENT
Start: 2021-08-26

## 2021-08-26 RX ORDER — SENNA AND DOCUSATE SODIUM 50; 8.6 MG/1; MG/1
1 TABLET, FILM COATED ORAL AT BEDTIME
Qty: 30 TABLET | Refills: 0 | Status: SHIPPED | OUTPATIENT
Start: 2021-08-26

## 2021-08-26 RX ORDER — IBUPROFEN 600 MG/1
600 TABLET, FILM COATED ORAL EVERY 6 HOURS PRN
Qty: 30 TABLET | Refills: 0 | Status: SHIPPED | OUTPATIENT
Start: 2021-08-26

## 2021-08-26 RX ADMIN — ACETAMINOPHEN 650 MG: 325 TABLET, FILM COATED ORAL at 01:39

## 2021-08-26 RX ADMIN — IBUPROFEN 800 MG: 800 TABLET, FILM COATED ORAL at 10:04

## 2021-08-26 RX ADMIN — IBUPROFEN 800 MG: 800 TABLET, FILM COATED ORAL at 16:56

## 2021-08-26 RX ADMIN — ACETAMINOPHEN 650 MG: 325 TABLET, FILM COATED ORAL at 16:56

## 2021-08-26 RX ADMIN — ACETAMINOPHEN 650 MG: 325 TABLET, FILM COATED ORAL at 07:47

## 2021-08-26 RX ADMIN — DOCUSATE SODIUM 100 MG: 100 CAPSULE, LIQUID FILLED ORAL at 07:48

## 2021-08-26 RX ADMIN — ACETAMINOPHEN 650 MG: 325 TABLET, FILM COATED ORAL at 11:54

## 2021-08-26 RX ADMIN — IBUPROFEN 800 MG: 800 TABLET, FILM COATED ORAL at 03:54

## 2021-08-26 ASSESSMENT — ACTIVITIES OF DAILY LIVING (ADL)
DIFFICULTY_COMMUNICATING: NO
FALL_HISTORY_WITHIN_LAST_SIX_MONTHS: NO
WEAR_GLASSES_OR_BLIND: NO
DRESSING/BATHING_DIFFICULTY: NO
WALKING_OR_CLIMBING_STAIRS_DIFFICULTY: NO
CONCENTRATING,_REMEMBERING_OR_MAKING_DECISIONS_DIFFICULTY: NO
TOILETING_ISSUES: NO
DOING_ERRANDS_INDEPENDENTLY_DIFFICULTY: NO
DIFFICULTY_EATING/SWALLOWING: NO

## 2021-08-26 NOTE — PROGRESS NOTES
PPD#1  Doing well. Pain well controlled on oral pain medication. Tolerating regular diet. Voiding well. Ambulating without difficulty. Lochia is scant. Breast feeding.   Desires to home today.     Vitals:    21 1631 21 1701 21 2126 21 2342   BP: (!) 143/85 135/89 136/77 134/78   Pulse:  88 91 86   Resp:  16 16 16   Temp:  98.5  F (36.9  C)  98.3  F (36.8  C)   TempSrc:  Oral  Oral   SpO2:       Weight:       Height:         General Appearance: NAD  Abdomen: Soft, NT, ND. Fundus firm at U-2  Extremities: NT, trace edema    Hemoglobin   Date Value Ref Range Status   2021 12.1 11.7 - 15.7 g/dL Final   2021 12.0 11.7 - 15.7 g/dL Final   2017 10.7 (L) 11.7 - 15.7 g/dL Final   ]    A/P: 36 year old  PPD#1 s/p . Hemodynamically stable.   -- outpatient precautions, expectations reviewed  -- stable for discharge home today     Sage Bowles MD  [facility]

## 2021-08-26 NOTE — PLAN OF CARE
Patient vital signs stable and meeting expected outcomes.  Breast and bottle feeding per pt request.   Up independently and voiding adequately.  Pain well controlled with tylenol and ibuprofen.  Able to perform all cares for self and infant.  Bonding well with baby.  FOB present and supportive at bedside.  Will continue to monitor.

## 2021-08-26 NOTE — ANESTHESIA POSTPROCEDURE EVALUATION
Patient: Rosette Aguialr    * No procedures listed *    Diagnosis:* No pre-op diagnosis entered *  Diagnosis Additional Information: No value filed.    Anesthesia Type:  Epidural    Note:  Disposition: Inpatient   Postop Pain Control:    PONV:    Neuro/Psych:    Airway/Respiratory:    CV/Hemodynamics:    Other NRE:    DID A NON-ROUTINE EVENT OCCUR? No    Event details/Postop Comments:  I or my partner was immediately available for management of this patient during epidural analgesia infusion.   Patient post labor epidural catheter, doing well.  She reports good pain relief with epidural catheter.  She denies ongoing sensorimotor block, headache, fever, chills or other complaints.  All questions answered, understanding voiced.  She will have us contacted for any questions or problems.           Last vitals:  Vitals Value Taken Time   BP     Temp     Pulse     Resp     SpO2         Electronically Signed By: Micah Wellington MD  August 26, 2021  12:23 PM

## 2021-08-26 NOTE — PLAN OF CARE
Data: Vital signs within normal limits. Postpartum checks within normal limits - see flow record. Patient eating and drinking normally. Patient able to empty bladder independently and is up ambulating. No apparent signs of infection. Patient performing self cares, is able to care for infant and is breastfeeding every 2-3 hours, did give formula once last night. Perineum intact, Is using medicated pads for perineal discomfort. Patient was laying with infant in hospital bed during bedside handoff with eyes intermittently closing. Educated mother and father on safe sleep and putting baby in bassinet if becoming sleep, parents verbalized understanding.  Action: Patient medicated with PRN tylenol and ibuprofen during the shift for cramping. See MAR.  Patient education done, see flow record.  Response: Parents bonding well with baby. Father of the baby Jaydon at bedside, supportive.  Plan: Continue current plan of care.  Anticipate discharge later today.

## 2021-08-26 NOTE — PLAN OF CARE
Discharge instructions completed.  Patient states she understands all discharge instructions and all her questions have been answered.  Verbalizes when she needs to return to clinic for follow up for herself and baby and to follow up with [facility] if they do not call.  She is caring for herself and her baby independently.  Prescriptions reviewed and sent to pharmacy.  Postpartum depression symptoms reviewed and encouraged frequent review of depression scale. Patient, , and baby discharged today.

## 2021-08-26 NOTE — LACTATION NOTE
Lactation in to see patient. Patient and bedside nurse states infant nursing well. No concerns. Joi used formula once, not feeling changes in breasts yet. Encouraged frequent feeds. No issues with milk supply with her other children. Has pump for home. Encouraged to call for assistance before home as needed.

## 2021-08-26 NOTE — DISCHARGE SUMMARY
[facility] Discharge Summary    Rosette Aguilar MRN# [id number]   Age: 36 year old YOB: 1985     Date of Admission:  2021  Date of Discharge::  2021    Admitting Physician:  Hao Wolff MD  Discharge Physician:  Sage Bowles MD     Home clinic: [facility]          Admission Diagnoses:     Intrauterine pregnancy at 38w4d  GBS negative status  Active labor          Discharge Diagnosis:     Viable male infant, delivered  S/p           Procedures:   Procedure(s): Spontaneous vaginal delivery        No other procedures performed during this admission           Medications Prior to Admission:     Medications Prior to Admission   Medication Sig Dispense Refill Last Dose     calcium carbonate (TUMS) 500 MG chewable tablet Take 1 chew tab by mouth 2 times daily   2021 at Unknown time     Prenatal Vit-Fe Fumarate-FA (PRENATAL MULTIVITAMIN  PLUS IRON) 27-0.8 MG TABS per tablet Take 1 tablet by mouth daily 100 tablet 3 2021 at Unknown time     Misc. Devices (BREAST PUMP) MISC 1 each daily as needed (breastfeeding) 1 each 0              Discharge Medications:     Current Discharge Medication List      START taking these medications    Details   acetaminophen (TYLENOL) 500 MG tablet Take 1-2 tablets (500-1,000 mg) by mouth every 6 hours as needed for mild pain  Qty: 30 tablet, Refills: 0    Associated Diagnoses:  (spontaneous vaginal delivery)      ibuprofen (ADVIL/MOTRIN) 600 MG tablet Take 1 tablet (600 mg) by mouth every 6 hours as needed for moderate pain or other (cramping)  Qty: 30 tablet, Refills: 0    Associated Diagnoses:  (spontaneous vaginal delivery)      SENNA-docusate sodium (SENNA S) 8.6-50 MG tablet Take 1 tablet by mouth At Bedtime  Qty: 30 tablet, Refills: 0    Associated Diagnoses:  (spontaneous vaginal delivery)         CONTINUE these medications which have NOT CHANGED    Details   calcium carbonate  (TUMS) 500 MG chewable tablet Take 1 chew tab by mouth 2 times daily      Prenatal Vit-Fe Fumarate-FA (PRENATAL MULTIVITAMIN  PLUS IRON) 27-0.8 MG TABS per tablet Take 1 tablet by mouth daily  Qty: 100 tablet, Refills: 3    Associated Diagnoses: Pregnancy test positive      Misc. Devices (BREAST PUMP) MISC 1 each daily as needed (breastfeeding)  Qty: 1 each, Refills: 0    Associated Diagnoses: Antepartum multigravida of advanced maternal age                   Consultations:   No consultations were requested during this admission          Brief History of Labor:     Refer to H&P for further details.            Hospital Course:       Refer to intrapartum notes and delivery summary for further details.   The patient's hospital course was unremarkable.  On discharge, her pain was well controlled. Vaginal bleeding is similar to peak menstrual flow.  Voiding without difficulty.  Ambulating well and tolerating a normal diet.  No fever.  Breastfeeding well.  Infant is stable.  No bowel movement yet.*  She was discharged on post-partum day #1.    Post-partum hemoglobin:   Hemoglobin   Date Value Ref Range Status   08/25/2021 12.1 11.7 - 15.7 g/dL Final   01/13/2021 12.0 11.7 - 15.7 g/dL Final             Discharge Instructions and Follow-Up:   Discharge diet: Regular   Discharge activity: Activity as tolerated   Discharge follow-up: Follow up with primary care provider in 6 weeks   Wound care: Drink plenty of fluids           Discharge Disposition:   Discharged to home      Attestation:  I have reviewed today's vital signs, notes, medications, labs and imaging.    Sage Bowles MD   [facility]

## 2021-08-26 NOTE — DISCHARGE INSTRUCTIONS
Postpartum Vaginal Delivery Instructions  Follow up with OBGYN in 6 weeks for post-delivery checks   [facility]: [phone]    Your activity upon discharge: Activity as tolerated   No lifting restrictions   No driving for 2 weeks or no driving while on narcotic pain medications   Nothing in the vagina including sex, douching or tampons for 6 weeks       Call  if you have any of the following:   Temperature > 100.4   Foul smelling vaginal discharge   Bleeding > 1 pad per hour x 2 hrs,   Pain not controlled by oral pain meds   Severe constipation or severe nausea or vomiting.      Activity       Ask family and friends for help when you need it.    Do not place anything in your vagina for 6 weeks.    You are not restricted on other activities, but take it easy for a few weeks to allow your body to recover from delivery.  You are able to do any activities you feel up to that point.    No driving until you have stopped taking your pain medications (usually two weeks after delivery).     Call your health care provider if you have any of these symptoms:       Increased pain, swelling, redness, or fluid around your stiches from an episiotomy or perineal tear.    A fever above 100.4 F (38 C) with or without chills when placing a thermometer under your tongue.    You soak a sanitary pad with blood within 1 hour, or you see blood clots larger than a golf ball.    Bleeding that lasts more than 6 weeks.    Vaginal discharge that smells bad.    Severe pain, cramping or tenderness in your lower belly area.    A need to urinate more frequently (use the toilet more often), more urgently (use the toilet very quickly), or it burns when you urinate.    Nausea and vomiting.    Redness, swelling or pain around a vein in your leg.    Problems breastfeeding or a red or painful area on your breast.    Chest pain and cough or are gasping for air.    Problems coping with sadness, anxiety, or depression.  If you have any concerns  about hurting yourself or the baby, call your provider immediately.     You have questions or concerns after you return home.     Keep your hands clean:  Always wash your hands before touching your perineal area and stitches.  This helps reduce your risk of infection.  If your hands aren't dirty, you may use an alcohol hand-rub to clean your hands. Keep your nails clean and short.

## 2021-08-27 ENCOUNTER — TELEPHONE (OUTPATIENT)
Dept: OBGYN | Facility: CLINIC | Age: 36
End: 2021-08-27

## 2021-08-27 NOTE — TELEPHONE ENCOUNTER
Reason for Call:  Other appointment    Detailed comments: PT looking for post partum appt    Phone Number Patient can be reached at: Cell number on file:    Telephone Information:   Mobile [phone]       Best Time: Anytime    Can we leave a detailed message on this number? YES    Call taken on 8/27/2021 at 12:19 PM by Sidra Kay

## 2021-10-03 ENCOUNTER — HEALTH MAINTENANCE LETTER (OUTPATIENT)
Age: 36
End: 2021-10-03

## 2021-10-08 ENCOUNTER — PRENATAL OFFICE VISIT (OUTPATIENT)
Dept: OBGYN | Facility: CLINIC | Age: 36
End: 2021-10-08
Payer: COMMERCIAL

## 2021-10-08 VITALS
BODY MASS INDEX: 29.53 KG/M2 | HEIGHT: 64 IN | HEART RATE: 80 BPM | SYSTOLIC BLOOD PRESSURE: 126 MMHG | WEIGHT: 173 LBS | DIASTOLIC BLOOD PRESSURE: 84 MMHG

## 2021-10-08 PROCEDURE — 99207 PR POST PARTUM EXAM: CPT | Performed by: OBSTETRICS & GYNECOLOGY

## 2021-10-08 ASSESSMENT — MIFFLIN-ST. JEOR: SCORE: 1449.72

## 2021-10-08 ASSESSMENT — PATIENT HEALTH QUESTIONNAIRE - PHQ9: SUM OF ALL RESPONSES TO PHQ QUESTIONS 1-9: 0

## 2021-10-08 NOTE — NURSING NOTE
"Chief Complaint   Patient presents with     Postpartum Care       Initial /84   Pulse 80   Ht 1.626 m (5' 4\")   Wt 78.5 kg (173 lb)   LMP 2020 (Within Days)   Breastfeeding Yes   BMI 29.70 kg/m   Estimated body mass index is 29.7 kg/m  as calculated from the following:    Height as of this encounter: 1.626 m (5' 4\").    Weight as of this encounter: 78.5 kg (173 lb).  BP completed using cuff size: regular    Questioned patient about current smoking habits.  Pt. has never smoked.          The following HM Due: NONE      The following patient reported/Care Every where data was sent to:  P ABSTRACT QUALITY INITIATIVES [[id number]]  Tess Lyn LPN                "

## 2021-10-08 NOTE — PROGRESS NOTES
"Rosette is here for a 6-week postpartum checkup.    She had a  of a viable boy, weight 7 pounds 4 oz., with none complications. Date of delivery was 21. Since delivery, she has been breast feeding.  She has no signs of infection, bleeding or other complications.  She is not pregnant.  We discussed contraception and she has chosen unsure.      Post partum tubal: No  History of Gestational Diabetes? No  Type of Delivery:  Vaginal  Feeding Method:  Breast  If initiated breast feeding and stopped, how long did you breast feed?:  Not applicable    REVIEW OF SYSTEMS:  Negative in all systems.  Is not thinking of more kids, would like to try minipill.    EXAM:  /84   Pulse 80   Ht 1.626 m (5' 4\")   Wt 78.5 kg (173 lb)   LMP 2020 (Within Days)   Breastfeeding Yes   BMI 29.70 kg/m    HEENT: grossly normal.  ABDOMEN: soft, non tender, good bowel sounds, without masses rebound, guarding or tenderness.  PELVIC:  External genitalia; normal without lesion, repair well healed.   Vagina: normal mucosa and rugae, no discharge.    EXTREMITIES:  warm to touch, good pulses, no ankle edema or calf tenderness.  NEUROLOGIC: grossly normal.    ASSESSMENT:   6-week postpartum exam after .    PLAN:    Contraception methods discussed  Discussed calcium intake, vitamins and supplements  Exercise encouraged  Follow up in 1 year.  One-hour glucose tolerance test needed? No  mini pill / progesterone only pill for contraception.    Sharmila Ramirez MD    "

## 2021-10-11 RX ORDER — ACETAMINOPHEN AND CODEINE PHOSPHATE 120; 12 MG/5ML; MG/5ML
0.35 SOLUTION ORAL DAILY
Qty: 84 TABLET | Refills: 3 | Status: SHIPPED | OUTPATIENT
Start: 2021-10-11 | End: 2023-05-22

## 2021-10-13 ENCOUNTER — OFFICE VISIT (OUTPATIENT)
Dept: PEDIATRICS | Facility: CLINIC | Age: 36
End: 2021-10-13
Payer: COMMERCIAL

## 2021-10-13 VITALS
OXYGEN SATURATION: 96 % | WEIGHT: 173.25 LBS | DIASTOLIC BLOOD PRESSURE: 62 MMHG | SYSTOLIC BLOOD PRESSURE: 100 MMHG | TEMPERATURE: 99.3 F | BODY MASS INDEX: 29.74 KG/M2 | HEART RATE: 110 BPM

## 2021-10-13 DIAGNOSIS — N61.0 MASTITIS: Primary | ICD-10-CM

## 2021-10-13 PROCEDURE — 99213 OFFICE O/P EST LOW 20 MIN: CPT | Performed by: PHYSICIAN ASSISTANT

## 2021-10-13 RX ORDER — DICLOXACILLIN SODIUM 500 MG
500 CAPSULE ORAL 4 TIMES DAILY
Qty: 40 CAPSULE | Refills: 0 | Status: SHIPPED | OUTPATIENT
Start: 2021-10-13 | End: 2024-06-28

## 2021-10-13 NOTE — PROGRESS NOTES
Assessment & Plan     Mastitis  Ice, ibuprofen/tylenol and antibiotics. Continue pumping as directed.   May apply clotrimazole to nipples after feeds.   Mother to continue to monitor temps and call in 48 hours if no improvement.   - dicloxacillin (DYNAPEN) 500 MG capsule; Take 1 capsule (500 mg) by mouth 4 times daily    KATHIE Whittaker [facility] BOB Dinero is a 36 year old who presents for the following health issue:   HPI   Patient is breastfeeding 7 week old son  Breast Concern  Onset/Duration: 4 days  Description: pain right  Location: lower outer quadrant  Pain or tenderness: YES  Redness: no  Intensity: severe  Progression of Symptoms: worsening  Accompanying Signs & Symptoms:  Any lumps in axillary region: no  Back hurts  Fevers and chills. Subjective fevers  Movable: not applicable  Nipple discharge: none  Changes in the skin or nipple: none  On Hormone therapy:  no   Does it change with menstrual cycle: not applicable  Previous history of similar problem: none  First degree relative with breast cancer: a negative family history for breast cancer.  Precipitating factors:           Worsened by: pain all the time, when baby nurses  Alleviating factors:            Improved by: tylenol   Therapies tried and outcome: tylenol helps    Patient's last menstrual period was 11/28/2020 (within days).    Review of Systems   Constitutional, HEENT, cardiovascular, pulmonary, gi and gu systems are negative, except as otherwise noted.      Objective    /62 (BP Location: Right arm, Patient Position: Sitting, Cuff Size: Adult Regular)   Pulse 110   Temp 99.3  F (37.4  C) (Temporal)   Wt 78.6 kg (173 lb 4 oz)   LMP 11/28/2020 (Within Days)   SpO2 96%   BMI 29.74 kg/m    Body mass index is 29.74 kg/m .  Physical Exam   GENERAL: alert and no distress  BREAST: enlarged bilaterally; very tender in the right lateral and inferior breast. Warm. No discharge.  CV:  regular rate and rhythm, normal S1 S2, no S3 or S4

## 2021-10-13 NOTE — PATIENT INSTRUCTIONS
1. Keep breastfeeding  2. Begin ice at site  3. Begin antibiotics  4. Apply clotrimazole to nipples (OTC)  5. Keep taking tylenol or ibuprofen as needed

## 2021-10-29 ENCOUNTER — MEDICAL CORRESPONDENCE (OUTPATIENT)
Dept: HEALTH INFORMATION MANAGEMENT | Facility: CLINIC | Age: 36
End: 2021-10-29
Payer: COMMERCIAL

## 2021-12-29 ENCOUNTER — MEDICAL CORRESPONDENCE (OUTPATIENT)
Dept: HEALTH INFORMATION MANAGEMENT | Facility: CLINIC | Age: 36
End: 2021-12-29
Payer: COMMERCIAL

## 2022-01-28 NOTE — PROGRESS NOTES
SUBJECTIVE:   Rosette Aguilar is a 31 year old female presenting with a chief complaint of abdominal cramping.    Patient with irregular menses, thinks LMP 2/20, obtain home pregnancy test and was positive.  Last evening, slipped and fell, developed abdominal cramping sensation.  Denies any bleeding.  Denies any dysuria or frequency.  Previous 2 pregnancy were vaginal delivery, full term with no complications except for GDM in first pregnancy    History reviewed. No pertinent past medical history.  Current Outpatient Prescriptions   Medication Sig Dispense Refill     NO ACTIVE MEDICATIONS   0     Social History   Substance Use Topics     Smoking status: Never Smoker     Smokeless tobacco: Never Used     Alcohol use No       ROS:  CONSTITUTIONAL:NEGATIVE for fever, chills, change in weight  INTEGUMENTARY/SKIN: NEGATIVE for worrisome rashes, moles or lesions  ENT/MOUTH: NEGATIVE for ear, mouth and throat problems  RESP:NEGATIVE for significant cough or SOB  CV: NEGATIVE for chest pain, palpitations or peripheral edema  GI: POSITIVE for abdominal pain   : negative for, dysuria and frequency    OBJECTIVE  :/84 (BP Location: Right arm, Cuff Size: Adult Regular)  Pulse 86  Temp 99  F (37.2  C) (Oral)  Wt 154 lb 6.4 oz (70 kg)  SpO2 100%  BMI 27.35 kg/m2  GENERAL APPEARANCE: healthy, alert and no distress  RESP: lungs clear to auscultation - no rales, rhonchi or wheezes  CV: regular rates and rhythm, normal S1 S2, no murmur noted  ABDOMEN:  soft, nontender, no HSM or masses and bowel sounds normal  SKIN: no suspicious lesions or rashes  PSYCH: mentation appears normal and affect normal/bright    Results for orders placed or performed in visit on 04/05/17   Beta HCG qual IFA urine   Result Value Ref Range    Beta HCG Qual IFA Urine Positive (A) NEG       ASSESSMENT/PLAN:  (Z32.01) Pregnancy test positive  (primary encounter diagnosis)  Comment: 6 wks gestation  Plan: Prenatal Vit-Fe Fumarate-FA (PRENATAL          MULTIVITAMIN  PLUS IRON) 27-0.8 MG TABS per         tablet            (Z32.00) Possible pregnancy  Comment: pregnant  Plan: Beta HCG qual IFA urine            Reviewed that discomfort may be part of early pregnancy, discussed potential for miscarriage and reviewed signs and symptoms.  RX PNV given.  Patient has appointment in 1 1/2 weeks for prenatal care and encourage to follow up.  If develops bleeding that spontaneous miscarriage will be more likely possible and should follow up with primary provider sooner.    Return to clinic if any further concerns.    Marquise Chacko MD  April 5, 2017 11:34 AM               generalized

## 2022-02-25 ENCOUNTER — MEDICAL CORRESPONDENCE (OUTPATIENT)
Dept: HEALTH INFORMATION MANAGEMENT | Facility: CLINIC | Age: 37
End: 2022-02-25

## 2022-04-11 ENCOUNTER — MYC MEDICAL ADVICE (OUTPATIENT)
Dept: OBGYN | Facility: CLINIC | Age: 37
End: 2022-04-11
Payer: COMMERCIAL

## 2022-04-11 DIAGNOSIS — Z30.016 ENCOUNTER FOR INITIAL PRESCRIPTION OF TRANSDERMAL PATCH HORMONAL CONTRACEPTIVE DEVICE: ICD-10-CM

## 2022-04-12 RX ORDER — NORELGESTROMIN AND ETHINYL ESTRADIOL 35; 150 UG/MG; UG/MG
PATCH TRANSDERMAL
Qty: 9 PATCH | Refills: 2 | Status: SHIPPED | OUTPATIENT
Start: 2022-04-12 | End: 2022-09-15

## 2022-09-04 ENCOUNTER — HEALTH MAINTENANCE LETTER (OUTPATIENT)
Age: 37
End: 2022-09-04

## 2022-09-15 DIAGNOSIS — Z30.016 ENCOUNTER FOR INITIAL PRESCRIPTION OF TRANSDERMAL PATCH HORMONAL CONTRACEPTIVE DEVICE: ICD-10-CM

## 2022-09-15 RX ORDER — NORELGESTROMIN AND ETHINYL ESTRADIOL 35; 150 UG/MG; UG/MG
PATCH TRANSDERMAL
Qty: 9 PATCH | Refills: 0 | Status: SHIPPED | OUTPATIENT
Start: 2022-09-15 | End: 2023-03-10

## 2022-09-15 NOTE — TELEPHONE ENCOUNTER
Pt just scheduled annual exam with provider, needs refill of birth control patch.    Mindy Hester RN

## 2022-11-21 ENCOUNTER — OFFICE VISIT (OUTPATIENT)
Dept: URGENT CARE | Facility: URGENT CARE | Age: 37
End: 2022-11-21
Payer: COMMERCIAL

## 2022-11-21 ENCOUNTER — NURSE TRIAGE (OUTPATIENT)
Dept: NURSING | Facility: CLINIC | Age: 37
End: 2022-11-21

## 2022-11-21 VITALS
TEMPERATURE: 98.4 F | SYSTOLIC BLOOD PRESSURE: 138 MMHG | DIASTOLIC BLOOD PRESSURE: 86 MMHG | OXYGEN SATURATION: 98 % | HEART RATE: 93 BPM

## 2022-11-21 DIAGNOSIS — R30.0 DYSURIA: Primary | ICD-10-CM

## 2022-11-21 LAB
ALBUMIN UR-MCNC: 30 MG/DL
APPEARANCE UR: CLEAR
BACTERIA #/AREA URNS HPF: ABNORMAL /HPF
BILIRUB UR QL STRIP: NEGATIVE
CLUE CELLS: ABNORMAL
COLOR UR AUTO: YELLOW
GLUCOSE UR STRIP-MCNC: NEGATIVE MG/DL
HGB UR QL STRIP: ABNORMAL
KETONES UR STRIP-MCNC: NEGATIVE MG/DL
LEUKOCYTE ESTERASE UR QL STRIP: ABNORMAL
NITRATE UR QL: POSITIVE
PH UR STRIP: 5.5 [PH] (ref 5–7)
RBC #/AREA URNS AUTO: ABNORMAL /HPF
SP GR UR STRIP: 1.01 (ref 1–1.03)
SQUAMOUS #/AREA URNS AUTO: ABNORMAL /LPF
TRICHOMONAS, WET PREP: ABNORMAL
UROBILINOGEN UR STRIP-ACNC: 0.2 E.U./DL
WBC #/AREA URNS AUTO: ABNORMAL /HPF
WBC'S/HIGH POWER FIELD, WET PREP: ABNORMAL
YEAST, WET PREP: ABNORMAL

## 2022-11-21 PROCEDURE — 87086 URINE CULTURE/COLONY COUNT: CPT | Performed by: NURSE PRACTITIONER

## 2022-11-21 PROCEDURE — 87210 SMEAR WET MOUNT SALINE/INK: CPT

## 2022-11-21 PROCEDURE — 81001 URINALYSIS AUTO W/SCOPE: CPT

## 2022-11-21 PROCEDURE — 99213 OFFICE O/P EST LOW 20 MIN: CPT | Performed by: NURSE PRACTITIONER

## 2022-11-21 PROCEDURE — 87186 SC STD MICRODIL/AGAR DIL: CPT | Performed by: NURSE PRACTITIONER

## 2022-11-21 RX ORDER — NITROFURANTOIN 25; 75 MG/1; MG/1
100 CAPSULE ORAL 2 TIMES DAILY
Qty: 10 CAPSULE | Refills: 0 | Status: SHIPPED | OUTPATIENT
Start: 2022-11-21 | End: 2024-06-28

## 2022-11-21 RX ORDER — NITROFURANTOIN 25; 75 MG/1; MG/1
100 CAPSULE ORAL 2 TIMES DAILY
Qty: 10 CAPSULE | Refills: 0 | Status: SHIPPED | OUTPATIENT
Start: 2022-11-21 | End: 2022-11-21

## 2022-11-21 NOTE — TELEPHONE ENCOUNTER
Nurse Triage SBAR    Is this a 2nd Level Triage? YES, LICENSED PRACTITIONER REVIEW IS REQUIRED    Situation: Pain with urination    Background: Patient calling, states that she has pain and burning with urination. Also states that she has lower back pain. She states that she has tried OTC remedies but is not feeling any better. She denies fever, denies blood in her urine. Denies recent injury.     Assessment: Probable UTI    Protocol Recommended Disposition:   Go To Office Now, See More Appropriate Protocol    Recommendation:     patient is electing to go to urgent care. She will call back if she has any further questions.      N/A     DELMAR CURRY RN      Does the patient meet one of the following criteria for ADS visit consideration? 16+ years old, with an [facility] PCP     TIP  Providers, please consider if this condition is appropriate for management at one of our Acute and Diagnostic Services sites.     If patient is a good candidate, please use dotphrase <dot>triageresponse and select Refer to ADS to document.    Reason for Disposition    Pain or burning with passing urine (urination) and female    Side (flank) or lower back pain present    Additional Information    Negative: Shock suspected (e.g., cold/pale/clammy skin, too weak to stand, low BP, rapid pulse)    Negative: Sounds like a life-threatening emergency to the triager    Negative: Followed a female genital area injury (e.g., vagina, vulva)    Negative: Followed a male genital area injury (penis, scrotum)    Negative: Vaginal discharge    Negative: Pus (white, yellow) or bloody discharge from end of penis    Negative: Pain or burning with passing urine (urination) and pregnant    Negative: Shock suspected (e.g., cold/pale/clammy skin, too weak to stand, low BP, rapid pulse)    Negative: Sounds like a life-threatening emergency to the triager    Negative: Unable to urinate (or only a few drops) and bladder feels very full    Negative: Vomiting     Negative: Patient sounds very sick or weak to the triager    Negative: SEVERE pain with urination    Negative: Fever > 100.4 F (38.0 C)    Protocols used: URINARY SYMPTOMS-A-OH, URINATION PAIN - FEMALE-A-OH

## 2022-11-21 NOTE — PROGRESS NOTES
Assessment & Plan     Dysuria    - UA Macro with Reflex to Micro and Culture - lab collect  - Wet prep - Clinic Collect  - UA Macro with Reflex to Micro and Culture - lab collect  - Urine Microscopic Exam  - Urine Culture  - nitroFURantoin macrocrystal-monohydrate (MACROBID) 100 MG capsule  Dispense: 10 capsule; Refill: 0     Macrobid, culture pending    Patient Instructions     Results for orders placed or performed in visit on 11/21/22   UA Macro with Reflex to Micro and Culture - lab collect     Status: Abnormal    Specimen: Urine, Midstream   Result Value Ref Range    Color Urine Yellow Colorless, Straw, Light Yellow, Yellow    Appearance Urine Clear Clear    Glucose Urine Negative Negative mg/dL    Bilirubin Urine Negative Negative    Ketones Urine Negative Negative mg/dL    Specific Gravity Urine 1.010 1.003 - 1.035    Blood Urine Small (A) Negative    pH Urine 5.5 5.0 - 7.0    Protein Albumin Urine 30 (A) Negative mg/dL    Urobilinogen Urine 0.2 0.2, 1.0 E.U./dL    Nitrite Urine Positive (A) Negative    Leukocyte Esterase Urine Moderate (A) Negative   Urine Microscopic Exam     Status: Abnormal   Result Value Ref Range    Bacteria Urine Few (A) None Seen /HPF    RBC Urine 2-5 (A) 0-2 /HPF /HPF    WBC Urine 10-25 (A) 0-5 /HPF /HPF    Squamous Epithelials Urine Few (A) None Seen /LPF   Wet prep - Clinic Collect     Status: Abnormal    Specimen: Vagina; Swab   Result Value Ref Range    Trichomonas Absent Absent    Yeast Absent Absent    Clue Cells Absent Absent    WBCs/high power field 2+ (A) None               Return in about 1 week (around 11/28/2022) for with regular provider if symptoms persist.    BRIAN Raygoza [facility] BOB Dinero is a 37 year old female who presents to clinic today for the following health issues:  Chief Complaint   Patient presents with     UTI     Poss UTI X4     HPI    UTI    Onset of symptoms was 4day(s).  Course of illness is  same  Severity moderate  Current and associated symptoms dysuria, frequency, urgency and burning  Treatment and measures tried Uristat (Pyridium) and Cranberry juice  Predisposing factors include none  Patient denies temperature > 101 degrees F., vomiting, vaginal discharge, vaginal odor and vaginal itching        Review of Systems  Constitutional, HEENT, cardiovascular, pulmonary, gi and gu systems are negative, except as otherwise noted.      Objective    /86   Pulse 93   Temp 98.4  F (36.9  C)   SpO2 98%   Physical Exam   GENERAL: healthy, alert and no distress  NECK: no adenopathy, no asymmetry, masses, or scars and thyroid normal to palpation  RESP: lungs clear to auscultation - no rales, rhonchi or wheezes  CV: regular rate and rhythm, normal S1 S2, no S3 or S4, no murmur, click or rub, no peripheral edema and peripheral pulses strong  ABDOMEN: soft, nontender, no hepatosplenomegaly, no masses and bowel sounds normal  MS: no gross musculoskeletal defects noted, no edema  BACK: no CVA tenderness, no paralumbar tenderness

## 2022-11-21 NOTE — PATIENT INSTRUCTIONS
Results for orders placed or performed in visit on 11/21/22   UA Macro with Reflex to Micro and Culture - lab collect     Status: Abnormal    Specimen: Urine, Midstream   Result Value Ref Range    Color Urine Yellow Colorless, Straw, Light Yellow, Yellow    Appearance Urine Clear Clear    Glucose Urine Negative Negative mg/dL    Bilirubin Urine Negative Negative    Ketones Urine Negative Negative mg/dL    Specific Gravity Urine 1.010 1.003 - 1.035    Blood Urine Small (A) Negative    pH Urine 5.5 5.0 - 7.0    Protein Albumin Urine 30 (A) Negative mg/dL    Urobilinogen Urine 0.2 0.2, 1.0 E.U./dL    Nitrite Urine Positive (A) Negative    Leukocyte Esterase Urine Moderate (A) Negative   Urine Microscopic Exam     Status: Abnormal   Result Value Ref Range    Bacteria Urine Few (A) None Seen /HPF    RBC Urine 2-5 (A) 0-2 /HPF /HPF    WBC Urine 10-25 (A) 0-5 /HPF /HPF    Squamous Epithelials Urine Few (A) None Seen /LPF   Wet prep - Clinic Collect     Status: Abnormal    Specimen: Vagina; Swab   Result Value Ref Range    Trichomonas Absent Absent    Yeast Absent Absent    Clue Cells Absent Absent    WBCs/high power field 2+ (A) None

## 2022-11-22 LAB — BACTERIA UR CULT: ABNORMAL

## 2022-12-16 ENCOUNTER — ALLIED HEALTH/NURSE VISIT (OUTPATIENT)
Dept: FAMILY MEDICINE | Facility: CLINIC | Age: 37
End: 2022-12-16
Payer: COMMERCIAL

## 2022-12-16 DIAGNOSIS — Z23 NEED FOR PROPHYLACTIC VACCINATION AND INOCULATION AGAINST INFLUENZA: Primary | ICD-10-CM

## 2022-12-16 PROCEDURE — 99207 PR NO CHARGE NURSE ONLY: CPT

## 2022-12-16 PROCEDURE — 90686 IIV4 VACC NO PRSV 0.5 ML IM: CPT

## 2022-12-16 PROCEDURE — 90471 IMMUNIZATION ADMIN: CPT

## 2023-01-21 ENCOUNTER — HEALTH MAINTENANCE LETTER (OUTPATIENT)
Age: 38
End: 2023-01-21

## 2023-02-27 NOTE — NURSING NOTE
"Chief Complaint   Patient presents with     Prenatal Care     26 3/7 weeks       Initial /62   Wt 85.3 kg (188 lb)   LMP 2020 (Within Days)   BMI 33.30 kg/m   Estimated body mass index is 33.3 kg/m  as calculated from the following:    Height as of 20: 1.6 m (5' 3\").    Weight as of this encounter: 85.3 kg (188 lb).  BP completed using cuff size: regular    Questioned patient about current smoking habits.  Pt. has never smoked.          The following HM Due: NONE  +fetal movement  -swelling    Nanda Lal, CMA    " Returns after episode of lightheadedness/dizziness after standing up from her desk at home.     Will hold furosemide and carvedilol.  Observation overnight  Check "orthostatic" BPs  If feeling better in AM may be dismissed with outpatient follow up with  Cardiologist, Dr. Fink.

## 2023-03-10 ENCOUNTER — TELEPHONE (OUTPATIENT)
Dept: FAMILY MEDICINE | Facility: CLINIC | Age: 38
End: 2023-03-10
Payer: COMMERCIAL

## 2023-03-10 DIAGNOSIS — Z30.016 ENCOUNTER FOR INITIAL PRESCRIPTION OF TRANSDERMAL PATCH HORMONAL CONTRACEPTIVE DEVICE: ICD-10-CM

## 2023-03-10 RX ORDER — NORELGESTROMIN AND ETHINYL ESTRADIOL 35; 150 UG/MG; UG/MG
PATCH TRANSDERMAL
Qty: 9 PATCH | Refills: 0 | Status: SHIPPED | OUTPATIENT
Start: 2023-03-10 | End: 2023-05-22

## 2023-03-10 NOTE — TELEPHONE ENCOUNTER
Medication Question or Refill        What medication are you calling about (include dose and sig)?: PT needs refill on her birth control, she uses the patch    Preferred Pharmacy:   [facility] - [address]  [address] [address] [place] [address]  Phone: [phone] Fax: [phone]      Controlled Substance Agreement on file:   CSA -- Patient Level:    CSA: None found at the patient level.       Who prescribed the medication?: PCP    Do you need a refill? Yes    When did you use the medication last? Change weekly    Patient offered an appointment? Yes: Physical on 5.22.23    Do you have any questions or concerns? PT will need refil prior to the week of 3.19.23      Could we send this information to you in [employer] or would you prefer to receive a phone call?:   Patient would like to be contacted via [employer]

## 2023-03-13 RX ORDER — NORELGESTROMIN AND ETHINYL ESTRADIOL 150; 35 UG/D; UG/D
PATCH TRANSDERMAL
Qty: 9 PATCH | Refills: 2 | OUTPATIENT
Start: 2023-03-13

## 2023-05-22 ENCOUNTER — OFFICE VISIT (OUTPATIENT)
Dept: FAMILY MEDICINE | Facility: CLINIC | Age: 38
End: 2023-05-22
Payer: COMMERCIAL

## 2023-05-22 VITALS
RESPIRATION RATE: 16 BRPM | WEIGHT: 225 LBS | TEMPERATURE: 98.4 F | SYSTOLIC BLOOD PRESSURE: 149 MMHG | HEART RATE: 82 BPM | HEIGHT: 64 IN | BODY MASS INDEX: 38.41 KG/M2 | OXYGEN SATURATION: 100 % | DIASTOLIC BLOOD PRESSURE: 96 MMHG

## 2023-05-22 DIAGNOSIS — R03.0 ELEVATED BLOOD PRESSURE READING WITHOUT DIAGNOSIS OF HYPERTENSION: ICD-10-CM

## 2023-05-22 DIAGNOSIS — B07.8 OTHER VIRAL WARTS: ICD-10-CM

## 2023-05-22 DIAGNOSIS — Z13.6 CARDIOVASCULAR SCREENING; LDL GOAL LESS THAN 160: ICD-10-CM

## 2023-05-22 DIAGNOSIS — Z30.016 ENCOUNTER FOR INITIAL PRESCRIPTION OF TRANSDERMAL PATCH HORMONAL CONTRACEPTIVE DEVICE: ICD-10-CM

## 2023-05-22 DIAGNOSIS — Z11.59 NEED FOR HEPATITIS C SCREENING TEST: Primary | ICD-10-CM

## 2023-05-22 LAB
ALBUMIN SERPL BCG-MCNC: 4.3 G/DL (ref 3.5–5.2)
ALP SERPL-CCNC: 78 U/L (ref 35–104)
ALT SERPL W P-5'-P-CCNC: 13 U/L (ref 10–35)
ANION GAP SERPL CALCULATED.3IONS-SCNC: 13 MMOL/L (ref 7–15)
AST SERPL W P-5'-P-CCNC: 19 U/L (ref 10–35)
BILIRUB SERPL-MCNC: 0.3 MG/DL
BUN SERPL-MCNC: 15.9 MG/DL (ref 6–20)
CALCIUM SERPL-MCNC: 9.4 MG/DL (ref 8.6–10)
CHLORIDE SERPL-SCNC: 104 MMOL/L (ref 98–107)
CHOLEST SERPL-MCNC: 246 MG/DL
CREAT SERPL-MCNC: 0.79 MG/DL (ref 0.51–0.95)
DEPRECATED HCO3 PLAS-SCNC: 23 MMOL/L (ref 22–29)
GFR SERPL CREATININE-BSD FRML MDRD: >90 ML/MIN/1.73M2
GLUCOSE SERPL-MCNC: 98 MG/DL (ref 70–99)
HDLC SERPL-MCNC: 72 MG/DL
LDLC SERPL CALC-MCNC: 141 MG/DL
NONHDLC SERPL-MCNC: 174 MG/DL
POTASSIUM SERPL-SCNC: 4.8 MMOL/L (ref 3.4–5.3)
PROT SERPL-MCNC: 7.2 G/DL (ref 6.4–8.3)
SODIUM SERPL-SCNC: 140 MMOL/L (ref 136–145)
TRIGL SERPL-MCNC: 166 MG/DL

## 2023-05-22 PROCEDURE — 99395 PREV VISIT EST AGE 18-39: CPT | Mod: 25 | Performed by: NURSE PRACTITIONER

## 2023-05-22 PROCEDURE — 86803 HEPATITIS C AB TEST: CPT | Performed by: NURSE PRACTITIONER

## 2023-05-22 PROCEDURE — 17110 DESTRUCTION B9 LES UP TO 14: CPT | Performed by: NURSE PRACTITIONER

## 2023-05-22 PROCEDURE — 80053 COMPREHEN METABOLIC PANEL: CPT | Performed by: NURSE PRACTITIONER

## 2023-05-22 PROCEDURE — 99213 OFFICE O/P EST LOW 20 MIN: CPT | Mod: 25 | Performed by: NURSE PRACTITIONER

## 2023-05-22 PROCEDURE — 17000 DESTRUCT PREMALG LESION: CPT | Mod: 59 | Performed by: NURSE PRACTITIONER

## 2023-05-22 PROCEDURE — 80061 LIPID PANEL: CPT | Performed by: NURSE PRACTITIONER

## 2023-05-22 PROCEDURE — 36415 COLL VENOUS BLD VENIPUNCTURE: CPT | Performed by: NURSE PRACTITIONER

## 2023-05-22 RX ORDER — NORELGESTROMIN AND ETHINYL ESTRADIOL 35; 150 UG/MG; UG/MG
PATCH TRANSDERMAL
Qty: 9 PATCH | Refills: 3 | Status: SHIPPED | OUTPATIENT
Start: 2023-05-22 | End: 2024-05-29

## 2023-05-22 ASSESSMENT — ENCOUNTER SYMPTOMS
BREAST MASS: 0
HEMATURIA: 0
FEVER: 0
COUGH: 0
EYE PAIN: 0
JOINT SWELLING: 0
DIARRHEA: 0
WEAKNESS: 0
DIZZINESS: 0
ABDOMINAL PAIN: 0
ARTHRALGIAS: 0
DYSURIA: 0
FREQUENCY: 0
NAUSEA: 0
SHORTNESS OF BREATH: 0
PARESTHESIAS: 0
CONSTIPATION: 0
NERVOUS/ANXIOUS: 0
MYALGIAS: 0
HEMATOCHEZIA: 0
HEARTBURN: 0
HEADACHES: 0
PALPITATIONS: 0
SORE THROAT: 0
CHILLS: 0

## 2023-05-22 ASSESSMENT — PAIN SCALES - GENERAL: PAINLEVEL: NO PAIN (0)

## 2023-05-22 NOTE — PROGRESS NOTES
SUBJECTIVE:   CC: Rosette is an 37 year old who presents for preventive health visit.     She reports a small wart on her right 4th ring finger, she has tried two home freeze treatments but it remains unchanged.     BP is elevated today, last known high BP reading was during an acute visit 11/22. Denies chronic HA, vision changes. Hx of gestational diabetes but no issues with blood pressure.           5/22/2023    10:11 AM   Additional Questions   Roomed by EVY ELLIS CMA   Accompanied by SELF         5/22/2023    10:11 AM   Patient Reported Additional Medications   Patient reports taking the following new medications NA   Patient has been advised of split billing requirements and indicates understanding: Yes  4    Healthy Habits:     Getting at least 3 servings of Calcium per day:  Yes    Bi-annual eye exam:  Yes    Dental care twice a year:  Yes    Sleep apnea or symptoms of sleep apnea:  None    Diet:  Regular (no restrictions)    Frequency of exercise:  2-3 days/week    Duration of exercise:  15-30 minutes    Taking medications regularly:  Yes    Medication side effects:  None    PHQ-2 Total Score: 0    Additional concerns today:  No        Refill birth control patch.  No smoking, migraines, blood clots.  She does not check her bp outside of clinic.  Was previously elevated due to pain.  She is not having any cardiac or respiratory symptoms.  She has been exercising regularly, 2-3 times per week, walks for 30 minutes.        Today's PHQ-2 Score:       5/22/2023     8:39 AM   PHQ-2 ( 1999 Pfizer)   Q1: Little interest or pleasure in doing things 0   Q2: Feeling down, depressed or hopeless 0   PHQ-2 Score 0   Q1: Little interest or pleasure in doing things Not at all   Q2: Feeling down, depressed or hopeless Not at all   PHQ-2 Score 0       Have you ever done Advance Care Planning? (For example, a Health Directive, POLST, or a discussion with a medical provider or your loved ones about your wishes): No,  advance care planning information given to patient to review.  Patient declined advance care planning discussion at this time.    Social History     Tobacco Use     Smoking status: Never     Smokeless tobacco: Never   Vaping Use     Vaping status: Never Used   Substance Use Topics     Alcohol use: No     Alcohol/week: 0.0 standard drinks of alcohol             5/22/2023     8:38 AM   Alcohol Use   Prescreen: >3 drinks/day or >7 drinks/week? No     Reviewed orders with patient.  Reviewed health maintenance and updated orders accordingly - Yes  Lab work is in process    Breast Cancer Screening:    FHS-7: Grandmother had ovarian and colon cancer.      5/22/2023     8:42 AM   Breast CA Risk Assessment (FHS-7)   Did any of your first-degree relatives have breast or ovarian cancer? Yes   Did any of your relatives have bilateral breast cancer? No   Did any man in your family have breast cancer? No   Did any woman in your family have breast and ovarian cancer? Yes   Did any woman in your family have breast cancer before age 50 y? No   Do you have 2 or more relatives with breast and/or ovarian cancer? No   Do you have 2 or more relatives with breast and/or bowel cancer? No     click delete button to remove this line now  Patient under 40 years of age: Routine Mammogram Screening not recommended.   Pertinent mammograms are reviewed under the imaging tab.    History of abnormal Pap smear: NO - age 30-65 PAP every 5 years with negative HPV co-testing recommended      Latest Ref Rng & Units 2/8/2021     9:40 AM 2/8/2021     9:38 AM 1/18/2018    12:22 PM   PAP / HPV   PAP (Historical)  NIL       HPV 16 DNA NEG^Negative  Negative   Negative     HPV 18 DNA NEG^Negative  Negative   Negative     Other HR HPV NEG^Negative  Negative   Negative       Reviewed and updated as needed this visit by clinical staff   Tobacco  Allergies  Meds              Reviewed and updated as needed this visit by Provider                 Past Medical  "History:   Diagnosis Date     Diabetes (H)     gdm     History of gestational diabetes 2004        Review of Systems   Constitutional: Negative for chills and fever.   HENT: Negative for congestion, ear pain, hearing loss and sore throat.    Eyes: Negative for pain and visual disturbance.   Respiratory: Negative for cough and shortness of breath.    Cardiovascular: Negative for chest pain, palpitations and peripheral edema.   Gastrointestinal: Negative for abdominal pain, constipation, diarrhea, heartburn, hematochezia and nausea.   Breasts:  Negative for tenderness, breast mass and discharge.   Genitourinary: Negative for dysuria, frequency, genital sores, hematuria, pelvic pain, urgency, vaginal bleeding and vaginal discharge.   Musculoskeletal: Negative for arthralgias, joint swelling and myalgias.   Skin: Negative for rash.   Neurological: Negative for dizziness, weakness, headaches and paresthesias.   Psychiatric/Behavioral: Negative for mood changes. The patient is not nervous/anxious.         OBJECTIVE:    BP (!) 149/96 (BP Location: Right arm, Patient Position: Sitting, Cuff Size: Adult Large)   Pulse 82   Temp 98.4  F (36.9  C) (Oral)   Resp 16   Ht 1.626 m (5' 4\")   Wt 102.1 kg (225 lb)   LMP 05/17/2023 (Exact Date)   SpO2 100%   BMI 38.62 kg/m       Physical Exam  Constitutional:       General: She is not in acute distress.     Appearance: Normal appearance. She is not ill-appearing.   HENT:      Right Ear: Tympanic membrane and ear canal normal.      Left Ear: Tympanic membrane and ear canal normal.      Nose: Nose normal.      Mouth/Throat:      Mouth: Mucous membranes are moist.      Pharynx: Oropharynx is clear.   Eyes:      Pupils: Pupils are equal, round, and reactive to light.   Neck:      Thyroid: No thyroid mass, thyromegaly or thyroid tenderness.   Cardiovascular:      Rate and Rhythm: Normal rate and regular rhythm.      Pulses: Normal pulses.      Heart sounds: Normal heart sounds. No " murmur heard.     No friction rub. No gallop.      Comments: Varicose veins right ankle, developed during pregnancy. Can become swollen during her menstrual cycles.   Pulmonary:      Effort: Pulmonary effort is normal. No respiratory distress.      Breath sounds: Normal breath sounds. No stridor. No wheezing or rales.   Abdominal:      General: Abdomen is flat. Bowel sounds are normal. There is no distension.      Palpations: Abdomen is soft. There is no mass.   Musculoskeletal:         General: No swelling.      Cervical back: No rigidity or tenderness.      Right lower leg: No edema.      Left lower leg: No edema.   Lymphadenopathy:      Cervical: No cervical adenopathy.   Skin:     General: Skin is warm and dry.      Capillary Refill: Capillary refill takes less than 2 seconds.          Neurological:      General: No focal deficit present.      Mental Status: She is alert.   Psychiatric:         Mood and Affect: Mood normal.         Behavior: Behavior normal.           Diagnostic Test Results:  Labs reviewed in Epic    ASSESSMENT/PLAN:   Rosette was seen today for physical.    Diagnoses and all orders for this visit:    Need for hepatitis C screening test  -     Hepatitis C Screen Reflex to HCV RNA Quant and Genotype; Future  -     Hepatitis C Screen Reflex to HCV RNA Quant and Genotype    Encounter for initial prescription of transdermal patch hormonal contraceptive device  -Working well, refilled. Patient is considering a tubal ligation will discuss alternative to patch if blood pressure remains elevated at recheck.   -     norelgestromin-ethinyl estradiol (ORTHO EVRA) 150-35 MCG/24HR patch; Remove old patch and apply new patch onto the skin once a week for 3 weeks (21 days). Do not wear patch week 4 (days 22-28), then repeat.    CARDIOVASCULAR SCREENING; LDL GOAL LESS THAN 160  -Disposition pending results.   -     Lipid panel reflex to direct LDL Fasting; Future  -     Comprehensive metabolic panel (BMP + Alb,  Alk Phos, ALT, AST, Total. Bili, TP); Future  -     Lipid panel reflex to direct LDL Fasting  -     Comprehensive metabolic panel (BMP + Alb, Alk Phos, ALT, AST, Total. Bili, TP)    Elevated blood pressure reading without diagnosis of hypertension  -Return in 2 week for a recheck at the clinic.     Other orders  -     REVIEW OF HEALTH MAINTENANCE PROTOCOL ORDERS  -     PRIMARY CARE FOLLOW-UP SCHEDULING; Future      Routine physical  Labs ordered.    Wart  Treated in clinic, see procedure note.        COUNSELING:  Reviewed preventive health counseling, as reflected in patient instructions  Special attention given to:        Regular exercise       Healthy diet/nutrition       Contraception        She reports that she has never smoked. She has never used smokeless tobacco.    I have discussed the patient's presenting complaint(s) with the np student and agree with the history, physical exam and plan as documented above. Her progress note reflects our assessment and plan.      BRIAN Mack Ra, CNP [facility] [place]

## 2023-05-22 NOTE — PROGRESS NOTES
Procedure: Curettage and cryotherapy  Diagnosis: viral wart  Location: R hand 4th digit  Specimen number: 1  Lesion size: 0.2mm    cryotherapy performed x3.  Wound care instructions given. Follow up with any wound problems, poor healing, or signs of infection.

## 2023-05-23 LAB — HCV AB SERPL QL IA: NONREACTIVE

## 2023-06-05 ENCOUNTER — ALLIED HEALTH/NURSE VISIT (OUTPATIENT)
Dept: FAMILY MEDICINE | Facility: CLINIC | Age: 38
End: 2023-06-05
Attending: NURSE PRACTITIONER
Payer: COMMERCIAL

## 2023-06-05 VITALS — HEART RATE: 79 BPM | OXYGEN SATURATION: 99 % | DIASTOLIC BLOOD PRESSURE: 80 MMHG | SYSTOLIC BLOOD PRESSURE: 130 MMHG

## 2023-06-05 DIAGNOSIS — R03.0 ELEVATED BLOOD PRESSURE READING WITHOUT DIAGNOSIS OF HYPERTENSION: Primary | ICD-10-CM

## 2023-06-05 PROCEDURE — 99207 PR NO CHARGE NURSE ONLY: CPT

## 2023-06-05 NOTE — PROGRESS NOTES
In for blood pressure check.    Today's reading: LMP 05/17/2023 (Exact Date)      History   Smoking Status     Never   Smokeless Tobacco     Never       Current Outpatient Medications   Medication Sig     acetaminophen (TYLENOL) 500 MG tablet Take 1-2 tablets (500-1,000 mg) by mouth every 6 hours as needed for mild pain     calcium carbonate (TUMS) 500 MG chewable tablet Take 1 chew tab by mouth 2 times daily (Patient not taking: Reported on 10/8/2021)     dicloxacillin (DYNAPEN) 500 MG capsule Take 1 capsule (500 mg) by mouth 4 times daily     ibuprofen (ADVIL/MOTRIN) 600 MG tablet Take 1 tablet (600 mg) by mouth every 6 hours as needed for moderate pain or other (cramping)     Misc. Devices (BREAST PUMP) MISC 1 each daily as needed (breastfeeding)     nitroFURantoin macrocrystal-monohydrate (MACROBID) 100 MG capsule Take 1 capsule (100 mg) by mouth 2 times daily     norelgestromin-ethinyl estradiol (ORTHO EVRA) 150-35 MCG/24HR patch Remove old patch and apply new patch onto the skin once a week for 3 weeks (21 days). Do not wear patch week 4 (days 22-28), then repeat.     Prenatal Vit-Fe Fumarate-FA (PRENATAL MULTIVITAMIN  PLUS IRON) 27-0.8 MG TABS per tablet Take 1 tablet by mouth daily     SENNA-docusate sodium (SENNA S) 8.6-50 MG tablet Take 1 tablet by mouth At Bedtime (Patient not taking: Reported on 10/8/2021)     No current facility-administered medications for this visit.        She is having her blood pressure monitored because it has been borderline elevated and has not been on medication.    Rosette has had the following symptoms: none     We discussed getting exercise and monitoring blood pressure at home.

## 2024-02-18 ENCOUNTER — HEALTH MAINTENANCE LETTER (OUTPATIENT)
Age: 39
End: 2024-02-18

## 2024-04-22 ENCOUNTER — PATIENT OUTREACH (OUTPATIENT)
Dept: CARE COORDINATION | Facility: CLINIC | Age: 39
End: 2024-04-22
Payer: COMMERCIAL

## 2024-05-06 ENCOUNTER — PATIENT OUTREACH (OUTPATIENT)
Dept: CARE COORDINATION | Facility: CLINIC | Age: 39
End: 2024-05-06
Payer: COMMERCIAL

## 2024-06-28 ENCOUNTER — OFFICE VISIT (OUTPATIENT)
Dept: FAMILY MEDICINE | Facility: CLINIC | Age: 39
End: 2024-06-28
Payer: COMMERCIAL

## 2024-06-28 VITALS
WEIGHT: 138 LBS | RESPIRATION RATE: 16 BRPM | HEIGHT: 64 IN | OXYGEN SATURATION: 98 % | BODY MASS INDEX: 23.56 KG/M2 | DIASTOLIC BLOOD PRESSURE: 97 MMHG | SYSTOLIC BLOOD PRESSURE: 139 MMHG | HEART RATE: 80 BPM

## 2024-06-28 DIAGNOSIS — R03.0 ELEVATED BLOOD PRESSURE READING WITHOUT DIAGNOSIS OF HYPERTENSION: Primary | ICD-10-CM

## 2024-06-28 DIAGNOSIS — Z86.19 HISTORY OF SHINGLES: ICD-10-CM

## 2024-06-28 LAB
ALBUMIN SERPL BCG-MCNC: 4.2 G/DL (ref 3.5–5.2)
ALP SERPL-CCNC: 74 U/L (ref 40–150)
ALT SERPL W P-5'-P-CCNC: 16 U/L (ref 0–50)
ANION GAP SERPL CALCULATED.3IONS-SCNC: 10 MMOL/L (ref 7–15)
AST SERPL W P-5'-P-CCNC: 22 U/L (ref 0–45)
BILIRUB SERPL-MCNC: 0.5 MG/DL
BUN SERPL-MCNC: 12.4 MG/DL (ref 6–20)
CALCIUM SERPL-MCNC: 9.4 MG/DL (ref 8.6–10)
CHLORIDE SERPL-SCNC: 105 MMOL/L (ref 98–107)
CHOLEST SERPL-MCNC: 259 MG/DL
CREAT SERPL-MCNC: 0.78 MG/DL (ref 0.51–0.95)
DEPRECATED HCO3 PLAS-SCNC: 25 MMOL/L (ref 22–29)
EGFRCR SERPLBLD CKD-EPI 2021: >90 ML/MIN/1.73M2
FASTING STATUS PATIENT QL REPORTED: YES
FASTING STATUS PATIENT QL REPORTED: YES
GLUCOSE SERPL-MCNC: 102 MG/DL (ref 70–99)
HDLC SERPL-MCNC: 59 MG/DL
LDLC SERPL CALC-MCNC: 144 MG/DL
NONHDLC SERPL-MCNC: 200 MG/DL
POTASSIUM SERPL-SCNC: 4.5 MMOL/L (ref 3.4–5.3)
PROT SERPL-MCNC: 7.1 G/DL (ref 6.4–8.3)
SODIUM SERPL-SCNC: 140 MMOL/L (ref 135–145)
TRIGL SERPL-MCNC: 280 MG/DL

## 2024-06-28 PROCEDURE — 80061 LIPID PANEL: CPT | Performed by: NURSE PRACTITIONER

## 2024-06-28 PROCEDURE — 80053 COMPREHEN METABOLIC PANEL: CPT | Performed by: NURSE PRACTITIONER

## 2024-06-28 PROCEDURE — G2211 COMPLEX E/M VISIT ADD ON: HCPCS | Performed by: NURSE PRACTITIONER

## 2024-06-28 PROCEDURE — 99213 OFFICE O/P EST LOW 20 MIN: CPT | Performed by: NURSE PRACTITIONER

## 2024-06-28 PROCEDURE — 36415 COLL VENOUS BLD VENIPUNCTURE: CPT | Performed by: NURSE PRACTITIONER

## 2024-06-28 ASSESSMENT — PAIN SCALES - GENERAL: PAINLEVEL: NO PAIN (0)

## 2024-06-28 NOTE — PROGRESS NOTES
"  Assessment & Plan     Elevated blood pressure reading without diagnosis of hypertension  Has stopped birth control.  Making diet changes.  Increase exercise.  Has physical scheduled next month- will recheck at that time.  - Comprehensive metabolic panel (BMP + Alb, Alk Phos, ALT, AST, Total. Bili, TP); Future  - Lipid panel reflex to direct LDL Fasting; Future  - Comprehensive metabolic panel (BMP + Alb, Alk Phos, ALT, AST, Total. Bili, TP)  - Lipid panel reflex to direct LDL Fasting    History of shingles  Improved as anticipated.    The longitudinal plan of care for the diagnosis(es)/condition(s) as documented were addressed during this visit. Due to the added complexity in care, I will continue to support Rosette in the subsequent management and with ongoing continuity of care.     MED REC REQUIRED  Post Medication Reconciliation Status:         Subjective   Rosette is a 38 year old, presenting for the following health issues:  UC Follow-Up        6/28/2024     8:18 AM   Additional Questions   Roomed by lawrence     HPI       ED/UC Followup:    Facility:  [facility]  Date of visit: 6/14/2024  Reason for visit: Rash  Current Status: improved.    Doing well.  Completed meds as directed.  Only occasional nerve pain but almost none.    Her bp is elevated.  Was anxious about visit.  She has stopped her birth control.  Her  will have a vasectomy.  Also, she has decreased salt in her diet.      Review of Systems  Constitutional, HEENT, cardiovascular, pulmonary, gi and gu systems are negative, except as otherwise noted.      Objective    BP (!) 139/97   Pulse 80   Resp 16   Ht 1.626 m (5' 4\")   Wt 62.6 kg (138 lb)   SpO2 98%   BMI 23.69 kg/m    Body mass index is 23.69 kg/m .  Physical Exam   GENERAL: alert and no distress  PSYCH: mentation appears normal, affect normal/bright            Signed Electronically by: BRIAN Mack Ra CNP    "

## 2024-07-07 ENCOUNTER — HEALTH MAINTENANCE LETTER (OUTPATIENT)
Age: 39
End: 2024-07-07

## 2024-07-16 ENCOUNTER — OFFICE VISIT (OUTPATIENT)
Dept: FAMILY MEDICINE | Facility: CLINIC | Age: 39
End: 2024-07-16
Payer: COMMERCIAL

## 2024-07-16 VITALS
TEMPERATURE: 98 F | HEART RATE: 81 BPM | WEIGHT: 135 LBS | BODY MASS INDEX: 23.05 KG/M2 | OXYGEN SATURATION: 100 % | DIASTOLIC BLOOD PRESSURE: 103 MMHG | HEIGHT: 64 IN | RESPIRATION RATE: 16 BRPM | SYSTOLIC BLOOD PRESSURE: 152 MMHG

## 2024-07-16 DIAGNOSIS — I83.813 VARICOSE VEINS OF BOTH LOWER EXTREMITIES WITH PAIN: ICD-10-CM

## 2024-07-16 DIAGNOSIS — E78.00 ELEVATED CHOLESTEROL: ICD-10-CM

## 2024-07-16 DIAGNOSIS — Z86.32 HISTORY OF GESTATIONAL DIABETES: ICD-10-CM

## 2024-07-16 DIAGNOSIS — Z00.00 ROUTINE GENERAL MEDICAL EXAMINATION AT A HEALTH CARE FACILITY: ICD-10-CM

## 2024-07-16 DIAGNOSIS — Z12.31 VISIT FOR SCREENING MAMMOGRAM: Primary | ICD-10-CM

## 2024-07-16 PROCEDURE — 99213 OFFICE O/P EST LOW 20 MIN: CPT | Mod: 25 | Performed by: NURSE PRACTITIONER

## 2024-07-16 PROCEDURE — 99396 PREV VISIT EST AGE 40-64: CPT | Performed by: NURSE PRACTITIONER

## 2024-07-16 SDOH — HEALTH STABILITY: PHYSICAL HEALTH: ON AVERAGE, HOW MANY MINUTES DO YOU ENGAGE IN EXERCISE AT THIS LEVEL?: 40 MIN

## 2024-07-16 SDOH — HEALTH STABILITY: PHYSICAL HEALTH: ON AVERAGE, HOW MANY DAYS PER WEEK DO YOU ENGAGE IN MODERATE TO STRENUOUS EXERCISE (LIKE A BRISK WALK)?: 4 DAYS

## 2024-07-16 ASSESSMENT — SOCIAL DETERMINANTS OF HEALTH (SDOH): HOW OFTEN DO YOU GET TOGETHER WITH FRIENDS OR RELATIVES?: ONCE A WEEK

## 2024-07-16 ASSESSMENT — PAIN SCALES - GENERAL: PAINLEVEL: NO PAIN (0)

## 2024-07-16 NOTE — PATIENT INSTRUCTIONS
Patient Education   Preventive Care Advice   This is general advice given by our system to help you stay healthy. However, your care team may have specific advice just for you. Please talk to your care team about your preventive care needs.  Nutrition  Eat 5 or more servings of fruits and vegetables each day.  Try wheat bread, brown rice and whole grain pasta (instead of white bread, rice, and pasta).  Get enough calcium and vitamin D. Check the label on foods and aim for 100% of the RDA (recommended daily allowance).  Lifestyle  Exercise at least 150 minutes each week  (30 minutes a day, 5 days a week).  Do muscle strengthening activities 2 days a week. These help control your weight and prevent disease.  No smoking.  Wear sunscreen to prevent skin cancer.  Have a dental exam and cleaning every 6 months.  Yearly exams  See your health care team every year to talk about:  Any changes in your health.  Any medicines your care team has prescribed.  Preventive care, family planning, and ways to prevent chronic diseases.  Shots (vaccines)   HPV shots (up to age 26), if you've never had them before.  Hepatitis B shots (up to age 59), if you've never had them before.  COVID-19 shot: Get this shot when it's due.  Flu shot: Get a flu shot every year.  Tetanus shot: Get a tetanus shot every 10 years.  Pneumococcal, hepatitis A, and RSV shots: Ask your care team if you need these based on your risk.  Shingles shot (for age 50 and up)  General health tests  Diabetes screening:  Starting at age 35, Get screened for diabetes at least every 3 years.  If you are younger than age 35, ask your care team if you should be screened for diabetes.  Cholesterol test: At age 39, start having a cholesterol test every 5 years, or more often if advised.  Bone density scan (DEXA): At age 50, ask your care team if you should have this scan for osteoporosis (brittle bones).  Hepatitis C: Get tested at least once in your life.  STIs (sexually  transmitted infections)  Before age 24: Ask your care team if you should be screened for STIs.  After age 24: Get screened for STIs if you're at risk. You are at risk for STIs (including HIV) if:  You are sexually active with more than one person.  You don't use condoms every time.  You or a partner was diagnosed with a sexually transmitted infection.  If you are at risk for HIV, ask about PrEP medicine to prevent HIV.  Get tested for HIV at least once in your life, whether you are at risk for HIV or not.  Cancer screening tests  Cervical cancer screening: If you have a cervix, begin getting regular cervical cancer screening tests starting at age 21.  Breast cancer scan (mammogram): If you've ever had breasts, begin having regular mammograms starting at age 40. This is a scan to check for breast cancer.  Colon cancer screening: It is important to start screening for colon cancer at age 45.  Have a colonoscopy test every 10 years (or more often if you're at risk) Or, ask your provider about stool tests like a FIT test every year or Cologuard test every 3 years.  To learn more about your testing options, visit:   .  For help making a decision, visit:   https://bit.ly/mq13424.  Prostate cancer screening test: If you have a prostate, ask your care team if a prostate cancer screening test (PSA) at age 55 is right for you.  Lung cancer screening: If you are a current or former smoker ages 50 to 80, ask your care team if ongoing lung cancer screenings are right for you.  For informational purposes only. Not to replace the advice of your health care provider. Copyright   2023 [place] [employer]. All rights reserved. Clinically reviewed by the [facility]. [place] [id number] - REV 01/24.

## 2024-07-16 NOTE — PROGRESS NOTES
Preventive Care Visit  [facility] BRIAN Doll Ra, CNP, Family Practice  Jul 16, 2024      Assessment & Plan     Visit for screening mammogram  - MA Screening Bilateral w/ Earl; Future    Routine general medical examination at a health care facility  Return for bp check with nurse as at home bps have improved.    History of gestational diabetes    - Hemoglobin A1c; Future    Elevated cholesterol  Continue with lifestyle changes.  Recheck in 3 months.  - Lipid panel reflex to direct LDL Fasting; Future    Varicose veins of both lower extremities with pain    - Vascular Surgery Referral; Future            Counseling  Appropriate preventive services were discussed with this patient, including applicable screening as appropriate for fall prevention, nutrition, physical activity, Tobacco-use cessation, weight loss and cognition.  Checklist reviewing preventive services available has been given to the patient.  Reviewed patient's diet, addressing concerns and/or questions.           Kathie Dinero is a 38 year old, presenting for the following:  Physical        7/16/2024     9:45 AM   Additional Questions   Roomed by MR   Accompanied by NA         7/16/2024     9:45 AM   Patient Reported Additional Medications   Patient reports taking the following new medications NA        Health Care Directive  Patient does not have a Health Care Directive or Living Will: Discussed advance care planning with patient; however, patient declined at this time.    HPI      Continuing with diet modifications and exercise.  She has been checking her bp at home and it has been well controlled.  Systolic running between 110-120.  Diastolic in the 80s.  She denies any chest pain, palpitations, sob.  She does note she has had chronic issues with varicose veins R>L.  Pain after long periods of standing.  Worse when she is menstruating.          7/16/2024   General Health   How would you rate your overall  physical health? Good   Feel stress (tense, anxious, or unable to sleep) Not at all            7/16/2024   Nutrition   Three or more servings of calcium each day? Yes   Diet: Regular (no restrictions)    Low salt   How many servings of fruit and vegetables per day? 4 or more   How many sweetened beverages each day? 0-1       Multiple values from one day are sorted in reverse-chronological order         7/16/2024   Exercise   Days per week of moderate/strenous exercise 4 days   Average minutes spent exercising at this level 40 min            7/16/2024   Social Factors   Frequency of gathering with friends or relatives Once a week   Worry food won't last until get money to buy more No   Food not last or not have enough money for food? No   Do you have housing? (Housing is defined as stable permanent housing and does not include staying ouside in a car, in a tent, in an abandoned building, in an overnight shelter, or couch-surfing.) No   Are you worried about losing your housing? No   Lack of transportation? No   Unable to get utilities (heat,electricity)? No   Want help with housing or utility concern? No      (!) HOUSING CONCERN PRESENT      7/16/2024   Dental   Dentist two times every year? Yes            7/16/2024   TB Screening   Were you born outside of the [place]? Yes              Today's PHQ-2 Score:       6/28/2024     1:15 AM   PHQ-2 ( 1999 Pfizer)   Q1: Little interest or pleasure in doing things 0   Q2: Feeling down, depressed or hopeless 0   PHQ-2 Score 0   Q1: Little interest or pleasure in doing things Not at all   Q2: Feeling down, depressed or hopeless Not at all   PHQ-2 Score 0         7/16/2024   Substance Use   Alcohol more than 3/day or more than 7/wk No   Do you use any other substances recreationally? No        Social History     Tobacco Use    Smoking status: Never    Smokeless tobacco: Never   Vaping Use    Vaping status: Never Used   Substance Use Topics    Alcohol use: No    Drug use: No            5/22/2023   LAST FHS-7 RESULTS   1st degree relative breast or ovarian cancer Yes   Any relative bilateral breast cancer No   Any male have breast cancer No   Any ONE woman have BOTH breast AND ovarian cancer Yes   Any woman with breast cancer before 50yrs No   2 or more relatives with breast AND/OR ovarian cancer No   2 or more relatives with breast AND/OR bowel cancer No           Ordered.        7/16/2024   STI Screening   New sexual partner(s) since last STI/HIV test? No        History of abnormal Pap smear: No - age 30- 64 PAP with HPV every 5 years recommended        Latest Ref Rng & Units 2/8/2021     9:40 AM 2/8/2021     9:38 AM 1/18/2018    12:22 PM   PAP / HPV   PAP (Historical)  NIL      HPV 16 DNA NEG^Negative  Negative  Negative    HPV 18 DNA NEG^Negative  Negative  Negative    Other HR HPV NEG^Negative  Negative  Negative      ASCVD Risk   The 10-year ASCVD risk score (Andrei PARKER, et al., 2019) is: 1.2%    Values used to calculate the score:      Age: 38 years      Sex: Female      Is Non- : No      Diabetic: No      Tobacco smoker: No      Systolic Blood Pressure: 152 mmHg      Is BP treated: No      HDL Cholesterol: 59 mg/dL      Total Cholesterol: 259 mg/dL        7/16/2024   Contraception/Family Planning   Questions about contraception or family planning No           Reviewed and updated as needed this visit by Provider                    Patient Active Problem List   Diagnosis    CARDIOVASCULAR SCREENING; LDL GOAL LESS THAN 160    AMA (advanced maternal age) multigravida 35+    Previous gestational diabetes mellitus, antepartum    Labor and delivery, indication for care     Past Surgical History:   Procedure Laterality Date    LASIK BILATERAL  2005       Social History     Tobacco Use    Smoking status: Never    Smokeless tobacco: Never   Substance Use Topics    Alcohol use: No     Family History   Problem Relation Age of Onset    Diabetes Maternal Grandmother      "Cancer Maternal Grandmother         Ovarian    Family History Negative Paternal Grandfather     Family History Negative Paternal Grandmother     Family History Negative Mother     Prostate Cancer Maternal Grandfather     Colon Cancer Maternal Grandfather     Hyperlipidemia Father              Review of Systems  Constitutional, HEENT, cardiovascular, pulmonary, gi and gu systems are negative, except as otherwise noted.     Objective    Exam  BP (!) 152/103   Pulse 81   Temp 98  F (36.7  C) (Oral)   Resp 16   Ht 1.626 m (5' 4\")   Wt 61.2 kg (135 lb)   LMP 06/14/2024   SpO2 100%   BMI 23.17 kg/m     Estimated body mass index is 23.17 kg/m  as calculated from the following:    Height as of this encounter: 1.626 m (5' 4\").    Weight as of this encounter: 61.2 kg (135 lb).    Physical Exam  GENERAL: alert and no distress  EYES: Eyes grossly normal to inspection  HENT: ear canals and TM's normal, nose and mouth without ulcers or lesions  NECK: no adenopathy, no asymmetry, masses, or scars  RESP: lungs clear to auscultation - no rales, rhonchi or wheezes  CV: regular rates and rhythm, normal S1 S2, no S3 or S4, no murmur, click or rub, peripheral pulses strong, no peripheral edema, and varicosities RLE.  ABDOMEN: soft, nontender, no hepatosplenomegaly, no masses and bowel sounds normal  NEURO: Normal strength and tone, mentation intact and speech normal  PSYCH: mentation appears normal, affect normal/bright        Signed Electronically by: BRIAN Mack Ra CNP    "

## 2024-07-19 ENCOUNTER — ANCILLARY PROCEDURE (OUTPATIENT)
Dept: MAMMOGRAPHY | Facility: CLINIC | Age: 39
End: 2024-07-19
Attending: NURSE PRACTITIONER
Payer: COMMERCIAL

## 2024-07-19 DIAGNOSIS — Z12.31 VISIT FOR SCREENING MAMMOGRAM: ICD-10-CM

## 2024-07-19 PROCEDURE — 77067 SCR MAMMO BI INCL CAD: CPT | Mod: TC | Performed by: RADIOLOGY

## 2024-07-19 PROCEDURE — 77063 BREAST TOMOSYNTHESIS BI: CPT | Mod: TC | Performed by: RADIOLOGY

## 2024-07-25 ENCOUNTER — OFFICE VISIT (OUTPATIENT)
Dept: VASCULAR SURGERY | Facility: CLINIC | Age: 39
End: 2024-07-25
Payer: COMMERCIAL

## 2024-07-25 DIAGNOSIS — I83.813 VARICOSE VEINS OF BOTH LOWER EXTREMITIES WITH PAIN: ICD-10-CM

## 2024-07-25 PROCEDURE — G2211 COMPLEX E/M VISIT ADD ON: HCPCS | Performed by: SURGERY

## 2024-07-25 PROCEDURE — 99203 OFFICE O/P NEW LOW 30 MIN: CPT | Performed by: SURGERY

## 2024-07-25 NOTE — LETTER
7/25/2024      Rosette Aguilar  [address] [place] [address]      Dear Colleague,    Thank you for referring your patient, Rosette Aguilar, to the [facility] [place]. Please see a copy of my visit note below.    I had the pleasure of seeing Rosette Aguilar in the vein clinic today.  There is a 72-year-old female who comes to urgent bilateral lower extremity pain.  She tells me that the pain is located in the area of the varicose veins.  Pain is not present in the morning.  Pain progresses during the day.  Pain on the right lower extremity.  She developed right lower extremity during her pregnancies.  She has 4 children and the youngest of the children is 4 years old.  During the pregnancy she reports compression stockings.  She usually needs wearing stockings thereafter.  She is having difficulty in performing household chores.  Pain is exacerbated by standing and doing activities of day-to-day living.  Elevation of legs to help with the symptoms.  She also describes burning and pruritus.  She also experiences edema.  She also notes cramping and heaviness of the legs.    She has dysmenorrhea and menorrhagia but does not have pelvic pain or dyspareunia.    On examination he has varicosities in the right lower extremities.  Also has scattered reticular and varicose veins both lower extremities.  There is also a cluster of varicose veins around the right ankle with hyperpigmentation and early lipodermatosclerosis.  There are no open wounds or ulcers.    Diagnosis: Symptomatic, lifestyle limiting bilateral lower extremity varicose veins with failure of conservative management.    Plan: I explained the pathophysiology of disease to her in detail.  She has failed conservative management.  The symptoms are lifestyle limiting.  My recommendation will be to proceed with bilateral lower extremity sonography.  I explained all this to her and her .  I will sit down with her after she has  her sonography done.  And we will come up with a long-term plan to improve her quality of life.      Again, thank you for allowing me to participate in the care of your patient.        Sincerely,        Nicola Jaquez MD

## 2024-07-25 NOTE — PROGRESS NOTES
I had the pleasure of seeing Rosette Aguilar in the vein clinic today.  There is a 72-year-old female who comes to urgent bilateral lower extremity pain.  She tells me that the pain is located in the area of the varicose veins.  Pain is not present in the morning.  Pain progresses during the day.  Pain on the right lower extremity.  She developed right lower extremity during her pregnancies.  She has 4 children and the youngest of the children is 4 years old.  During the pregnancy she reports compression stockings.  She usually needs wearing stockings thereafter.  She is having difficulty in performing household chores.  Pain is exacerbated by standing and doing activities of day-to-day living.  Elevation of legs to help with the symptoms.  She also describes burning and pruritus.  She also experiences edema.  She also notes cramping and heaviness of the legs.    She has dysmenorrhea and menorrhagia but does not have pelvic pain or dyspareunia.    On examination he has varicosities in the right lower extremities.  Also has scattered reticular and varicose veins both lower extremities.  There is also a cluster of varicose veins around the right ankle with hyperpigmentation and early lipodermatosclerosis.  There are no open wounds or ulcers.    Diagnosis: Symptomatic, lifestyle limiting bilateral lower extremity varicose veins with failure of conservative management.    Plan: I explained the pathophysiology of disease to her in detail.  She has failed conservative management.  The symptoms are lifestyle limiting.  My recommendation will be to proceed with bilateral lower extremity sonography.  I explained all this to her and her .  I will sit down with her after she has her sonography done.  And we will come up with a long-term plan to improve her quality of life.

## 2024-07-25 NOTE — NURSING NOTE
Patient Reported symptoms:    Right leg   Heaviness Some of the time   Achiness All of the time during period - otherwise some time  Swelling All of the time during period - otherwise some time  Throbbing Some of the time   Itching All of the time during period - otherwise some time  Appearance Moderately noticeable   Impact on work/activities Mildly reduced     Left Leg   Heaviness None of the time   Achiness All of the time during period - otherwise some time  Swelling All of the time during period - otherwise some time  Throbbing None of the time   Itching All of the time during period  - otherwise some time  Appearance Moderately noticeable   Impact on work/activities Mildly reduced

## 2024-07-30 ENCOUNTER — ALLIED HEALTH/NURSE VISIT (OUTPATIENT)
Dept: FAMILY MEDICINE | Facility: CLINIC | Age: 39
End: 2024-07-30
Payer: COMMERCIAL

## 2024-07-30 ENCOUNTER — TELEPHONE (OUTPATIENT)
Dept: FAMILY MEDICINE | Facility: CLINIC | Age: 39
End: 2024-07-30

## 2024-07-30 VITALS — DIASTOLIC BLOOD PRESSURE: 80 MMHG | SYSTOLIC BLOOD PRESSURE: 138 MMHG | HEART RATE: 88 BPM

## 2024-07-30 DIAGNOSIS — Z01.30 BLOOD PRESSURE CHECK: Primary | ICD-10-CM

## 2024-07-30 PROCEDURE — 99207 PR NO CHARGE NURSE ONLY: CPT

## 2024-07-30 NOTE — TELEPHONE ENCOUNTER
Rosette Aguilar is a 38 year old patient who comes in today for a Blood Pressure check.  Initial BP:  /80 (BP Location: Right arm, Patient Position: Sitting, Cuff Size: Adult Regular)   Pulse 88   LMP 06/14/2024      88  Disposition: results routed to provider    Monae Medina MA       BP Readings from Last 6 Encounters:   07/30/24 138/80   07/16/24 (!) 152/103   06/28/24 (!) 139/97   06/05/23 130/80   05/22/23 (!) 149/96   11/21/22 138/86

## 2024-07-30 NOTE — PROGRESS NOTES
Rosette Aguilar is a 38 year old patient who comes in today for a Blood Pressure check.  Initial BP:  /80 (BP Location: Right arm, Patient Position: Sitting, Cuff Size: Adult Regular)   Pulse 88   LMP 06/14/2024      88  Disposition: results routed to provider    Monae Medina MA

## 2024-08-22 ENCOUNTER — ANCILLARY PROCEDURE (OUTPATIENT)
Dept: ULTRASOUND IMAGING | Facility: CLINIC | Age: 39
End: 2024-08-22
Attending: SURGERY
Payer: COMMERCIAL

## 2024-08-22 ENCOUNTER — OFFICE VISIT (OUTPATIENT)
Dept: VASCULAR SURGERY | Facility: CLINIC | Age: 39
End: 2024-08-22
Attending: SURGERY
Payer: COMMERCIAL

## 2024-08-22 DIAGNOSIS — I83.813 VARICOSE VEINS OF BOTH LOWER EXTREMITIES WITH PAIN: ICD-10-CM

## 2024-08-22 DIAGNOSIS — I83.813 VARICOSE VEINS OF LOWER EXTREMITY WITH PAIN, BILATERAL: Primary | ICD-10-CM

## 2024-08-22 PROCEDURE — 99213 OFFICE O/P EST LOW 20 MIN: CPT | Performed by: SURGERY

## 2024-08-22 PROCEDURE — G2211 COMPLEX E/M VISIT ADD ON: HCPCS | Performed by: SURGERY

## 2024-08-22 PROCEDURE — 93970 EXTREMITY STUDY: CPT | Performed by: SURGERY

## 2024-08-22 NOTE — PATIENT INSTRUCTIONS
Sclerotherapy: Pre-Treatment Instructions    Recommended Sessions:  ___1___ treatment sessions    Pricing: Your treatment will be billed through insurance.     Time Required per Treatment Session - About 45 minutes  Please come in 15 minutes before your scheduled appointment.  30 min.  Sclerotherapy treatments last approximately 30 minutes.  5 min.    A staff member will wipe your legs off with warm water and dry them with a wash cloth.                 Then you can put your compression hose on, get dressed and check out.  10 min.  After your treatment, you will be asked to walk around for 10 minutes before you get in your car.    Medications  Five days before your appointment, discontinue aspirin (Bufferin, Anacin, etc.) and Ibuprofen (Motrin, Advil, Aleve, etc.) to reduce bruising. Resume these medications the day following the treatment.    Leg Preparation  Do not shave your legs or apply any oil, lotion or powder the night before or the day of your treatment.    Clothing  Shorts:  Bring a pair of loose, comfortable shorts to wear during your treatment (or you can choose to wear ours). Shoes: Bring comfortable shoes to accommodate the compression hose after your treatment. Do not wear flip-flops or thong-style sandals unless you have open-toe compression hose.    Photographs  Photos will be taken before each treatment. This helps monitor your progress.    Injections  The physician will inject your veins with the sclerotherapy solution chosen to meet your specific needs.    Compression Hose  Please bring your compression hose if you have them. They may also be reserved for you at our clinic. Compression hose must be worn immediately after each sclerotherapy treatment.  The hose must be compression level 20-30, and they must be worn for 24 hours straight after your treatment.  If you have never worn compression hose before, a staff member will teach you how to put them on.             You cannot have a treatment  without compression hose.               They are critical to the success of your treatment!    You may purchase your compression hose from us. We will measure you and have the hose available when you come for your treatment.    Cancellation and Rescheduling  If you need to cancel or reschedule your sclerotherapy treatment, please give our office at least 24 hour notice.    Sclerotherapy: Basic Information    What is sclerotherapy?  Sclerotherapy is a treatment for  spider  veins.  Spider  veins are small veins just under your skin that can look red, blue or purple. Most  spider  veins are only a cosmetic problem.  Spider  veins are not useful and treating them will not affect your circulation.    How does sclerotherapy work?  1.  Injections: A very small needle is used to inject a solution into the  spider  veins. The solution irritates the cells that line the vein walls. This causes the veins to collapse. The vein walls to stick together and they can no longer carry blood. Different solutions are used based on the size of the veins.  2.  Compression:  The spider veins are kept collapsed by wearing compression stockings. Your body will break down and absorb the treated veins. You wear the compression hose for 24 hours after the treatment and then for 4 more days during your waking hours only.    How does the body heal after sclerotherapy?  The process is similar to how your body heals after a bad bruise. It takes 4-6 weeks or more for the healing to be complete. When the healing is complete, the vein is no longer visible. It may take more than one treatment.    How do I get the best results?  It is important to follow the post-sclerotherapy instructions. The best results require time and patience. The injection sites will continue to heal and fade for months after a treatment. Please discuss your expectations with your doctor to keep them realistic. Your doctor will do everything possible to meet or exceed your  expectations.    How many treatments are needed?  After your initial exam, your doctor will give you an estimate of the number of treatments that may be required. It depends upon the size, type, and quantity of your  spider  veins and on the doctor's assessment, your history and expectations. You may end up needing fewer or more treatments.    How soon can I have another treatment?  Additional treatments are scheduled every 4-6 weeks to allow time for the body to respond to the previous treatment.    Common Side Effects:  Itching  The areas that were injected may itch. This is usually mild and lasts less than a day. Do not use lotions or creams on your legs until the injection sites have healed over.    Pain  It is common to have some tenderness at the injection sites. Injection of the solution can be uncomfortable, but is usually well tolerated by most patients. The tenderness is temporary, lasting 24 hours at most. Tylenol or Ibuprofen can be used, if needed, following the product directions.    Bruising  This may occur at the injections sites. Bruising may be minimized by avoiding Aspirin and Ibuprofen products for five days before each treatment session.    Hyperpigmentation  A light brown discoloration of the skin may develop along the veins in the areas injected. Approximately 20-30% of patients treated note the discoloration (which is lighter and less obvious than the veins that are being treated). The hyperpigmentation usually fades in a couple of weeks, but may take several months to a year to totally resolve. There is a 1% chance of hyperpigmentation continuing after one year.    Trapped blood  A small amount of blood may become trapped and hardened in the veins. This may feel like a knot or cord and it may look dark blue or bruised. This is a common occurrence. You may need to return before your next treatment so this area can be drained to remove the trapped blood. This will reduce the hyperpigmentation  that can occur. The chance of this occurring can be decreased with proper use of compression hose after your treatment.    Matting  Matting is the formation of new, fine  spider  veins in the area injected.  It occurs in approximately 10% of patients injected. The exact reason for this is unknown. If untreated, the matting usually resolves in 3 to 12 months, but very rarely, it can be permanent. If the matting does not fade, it can be re-injected.    Rare Side Effects:  Ulceration at injection sites  Very rarely, a small ulcer will occur at the site where a vein is injected. An ulcer can take 4 to 6 weeks to completely heal. A small scar may result.    Allergic reactions  There is a very rare incidence of an allergic reaction to the solution injected. You will be observed for such reaction and will be treated appropriately should it occur. Please inform us of any allergy history.    Pulmonary embolus/Deep vein thrombosis  This is a blood clot which moves to the lungs/a blood clot in the deep vein system. There is an extraordinarily low incidence of this complication.      SCLEROTHERAPY AFTER CARE  Immediately:  After treatment, walk for 10-15 minutes before getting in your car.  If your trip home is more than 1 hour, stop and walk around for 5-10 minutes. Avoid sitting or standing for extended periods.   First 24 hours: Wear your compression continuously, even while in bed. After the 24 hours, you may shower if you want to. Put your hose back on, unless you are going to bed. You should NOT wear compression to bed after the first 24 hours. You may fly the next day, but wear your compression.   For 5 days: Wear the compression hose for waking hours only. You may continue to wear them longer than 5 days if you prefer.   For days 5-7: Walking is encouraged, as it promotes efficient circulation in your veins. You may do activities that raise your heart rate, but do NOT run, jog, do high impact aerobics, or weight  lifting. After 7 days, no activity restrictions.  Shaving: Wait a few days to shave or apply lotion.   Bathing: Do NOT take hot baths or sit in a hot tub for 7-10 days.    For 1 year: Wear SPF 30 sunscreen on your legs when in the sun. This is very important! It helps prevent darkening of the skin at the injection sites.   Medications: You may resume your usual medications, including aspirin or ibuprofen.    Common Things to Expect       Compression must be worn for the first 24 hours and then during the day for 5-7 days.    If larger veins are treated with ultrasound-guided sclerotherapy, you will have redness, firmness, tenderness, and swelling.  This firmness and tenderness may take 3-6 months to resolve. Ibuprofen and compression hose will aid in this process.    You will have bruising that can last up to 3 weeks. Most fading of the veins will occur between 3 and 6 weeks after treatment.    You may notice brown discoloration (hyperpigmentation) at the treatment site.  This should fade with time, but will take 3 months to 1 year to fully heal.     Some treated veins may look darker because of trapped blood within the vein. This trapped blood can be removed at a minimum of 1 month following treatment. Larger veins are more likely to develop trapped blood.    It is very important for you to use at least SPF 30 sunscreen in order to help prevent the discoloration of your skin.    Migraines rarely occur following sclerotherapy, but are more likely in patients with a history of migraines.  Treat as you would any other migraine.     Thigh High, medical grade, 20-30 mmHg strength, compression stockings are recommended. .    Options for purchasing compression stockings:    You can call your insurance company and ask if compression stockings are covered.  They usually fall under your Durable Medical Equipment (DME) coverage, if covered. The DME codes if they ask are: Knee high , Thigh high , Panty .   Be  sure to ask if you need a specific diagnosis for coverage, if your deductible needs to be met first, how many pairs are covered and how often.  If insurance covers and you would like to order from [facility], or another medical supply company: call the clinic back and we can fax a prescription to your medical supply company of choice. They will bill your insurance and ship them to you. We will ask you color choice (black or beige), and toe choice (open or closed toe).    We sell many sizes in our clinic (except specialty order or petite sizing). We can check to see if we have your size.  Knee high (Mediven brand) are $60/pair  Thigh high (Mediven brand) are $85/pair.   If you would like to purchase from the clinic, let us know including your color choice (black or beige), and toe choice (open or closed toe), and we will set them aside for you to  and pay for at your next clinic appointment or the day of your procedure.    Online website ordering options:   [email or web address] has many options available.

## 2024-08-22 NOTE — PROGRESS NOTES
It was a pleasure again to see Rosette Aguilar in the vein clinic today.  She is a very pleasant 39-year-old female who I had previously seen for bilateral lower extremity pain related to varicose veins.  She tells me that the pain is located in the area of the varicose veins.  Pain is not present in the morning.  Pain progresses during the day.  She has 4 children and the youngest of the children is 4 years old.  During pregnancy she wore compression stockings.  She has been wearing compression stocking thereafter.  Pain is exacerbated by standing and doing activities of day-to-day living.  Elevation of legs seems to help the symptoms.  She also describes burning and pruritus.  She also experiences edema.  She also notices cramping and heaviness of the lower extremities.  She has dysmenorrhea and menorrhagia but does not have pelvic pain or dyspareunia.    On examination she has varicosities in the right and left lower extremities.  She also has scattered reticular and spider veins in both lower extremities.  There is also a cluster of varicose veins around the right ankle with hyperpigmentation and early lipodermatosclerosis.    She underwent sonography.  On the right side there is a pelvic floor vein in the right thigh.  Proximal great saphenous vein is incompetent on the right side and the varicose veins in the right calf are arising from the incompetent segment of the great saphenous vein below the knee.    On the left side proximal great saphenous vein is incompetent with distal great saphenous vein having incompetence and giving rise to the varicosities.    Whilst most of the great saphenous veins are competent I do not feel the need for great saphenous vein ablation.  I think we can address of the painful varicose veins and the velocities around the right ankle which are causing hyperpigmentation changes with ultrasound-guided medically necessary sclerotherapy.  Which is what I am going to propose to the  insurance company for approval.  This was discussed with the patient and her .  They are in agreement.

## 2024-08-22 NOTE — PROGRESS NOTES
After Visit Follow Up  Per Dr. Jaquez, patient was recommended to have 1 sessions of medically necessary ultrasound guided sclerotherapy.    Reviewed with and gave to patient our vein clinic sclerotherapy packet of information which includes basic sclerotherapy information, pre-treatment instructions and post-treatment instructions.    Patient in agreement with plan and had no further questions.    Louise Campbell RN on 8/22/2024 at 9:37 AM

## 2024-08-22 NOTE — LETTER
8/22/2024      Rosette Aguilar  [address] [facility] [address]      Dear Colleague,    Thank you for referring your patient, Rosette Aguilar, to the [facility] [place]. Please see a copy of my visit note below.    After Visit Follow Up  Per Dr. Jaquez, patient was recommended to have 1 sessions of medically necessary ultrasound guided sclerotherapy.    Reviewed with and gave to patient our vein clinic sclerotherapy packet of information which includes basic sclerotherapy information, pre-treatment instructions and post-treatment instructions.    Patient in agreement with plan and had no further questions.    Louise Campbell RN on 8/22/2024 at 9:37 AM    It was a pleasure again to see Rosette Aguilar in the vein clinic today.  She is a very pleasant 39-year-old female who I had previously seen for bilateral lower extremity pain related to varicose veins.  She tells me that the pain is located in the area of the varicose veins.  Pain is not present in the morning.  Pain progresses during the day.  She has 4 children and the youngest of the children is 4 years old.  During pregnancy she wore compression stockings.  She has been wearing compression stocking thereafter.  Pain is exacerbated by standing and doing activities of day-to-day living.  Elevation of legs seems to help the symptoms.  She also describes burning and pruritus.  She also experiences edema.  She also notices cramping and heaviness of the lower extremities.  She has dysmenorrhea and menorrhagia but does not have pelvic pain or dyspareunia.    On examination she has varicosities in the right and left lower extremities.  She also has scattered reticular and spider veins in both lower extremities.  There is also a cluster of varicose veins around the right ankle with hyperpigmentation and early lipodermatosclerosis.    She underwent sonography.  On the right side there is a pelvic floor vein in the right thigh.  Proximal great  saphenous vein is incompetent on the right side and the varicose veins in the right calf are arising from the incompetent segment of the great saphenous vein below the knee.    On the left side proximal great saphenous vein is incompetent with distal great saphenous vein having incompetence and giving rise to the varicosities.    Whilst most of the great saphenous veins are competent I do not feel the need for great saphenous vein ablation.  I think we can address of the painful varicose veins and the velocities around the right ankle which are causing hyperpigmentation changes with ultrasound-guided medically necessary sclerotherapy.  Which is what I am going to propose to the insurance company for approval.  This was discussed with the patient and her .  They are in agreement.      Again, thank you for allowing me to participate in the care of your patient.        Sincerely,        Nicola Jaquez MD

## 2024-09-10 ENCOUNTER — TELEPHONE (OUTPATIENT)
Dept: VASCULAR SURGERY | Facility: CLINIC | Age: 39
End: 2024-09-10
Payer: COMMERCIAL

## 2024-09-10 NOTE — TELEPHONE ENCOUNTER
After Visit Follow Up  Per patient request, [phone]   Patient would like one pair(s) of beige, open-toe, thigh high, 20-30mmhg compression hose. Fax confirmed.    Patient is aware the compression hose will be shipped to her home address.    Louise Campbell RN

## 2024-09-10 NOTE — TELEPHONE ENCOUNTER
Compression Hose Order  Pt would like 1 pair(s) of beige, open-toe, thigh high, 20-30mmhg compression hose.    [facility]    Patient has sclerotherapy on 9/19/24 with Dr. Jaquez.    Louise Meza  [facility]  Vein Clinic

## 2024-09-19 ENCOUNTER — OFFICE VISIT (OUTPATIENT)
Dept: VASCULAR SURGERY | Facility: CLINIC | Age: 39
End: 2024-09-19
Payer: COMMERCIAL

## 2024-09-19 DIAGNOSIS — I83.813 VARICOSE VEINS OF LOWER EXTREMITY WITH PAIN, BILATERAL: Primary | ICD-10-CM

## 2024-09-19 PROCEDURE — 36471 NJX SCLRSNT MLT INCMPTNT VN: CPT | Mod: 50 | Performed by: SURGERY

## 2024-09-19 PROCEDURE — 76942 ECHO GUIDE FOR BIOPSY: CPT | Performed by: SURGERY

## 2024-09-19 NOTE — PROGRESS NOTES
Vein Clinic Procedure Note    Preoperative diagnosis:    1.  Bilateral lower extremity chronic venous insufficiency.  2.  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    Post operative diagnosis:  Same    Procedure:  1.  Ultrasound-guided right lower extremity foam sclerotherapy.  2.  Ultrasound-guided left lower extremity foam sclerotherapy.    Sclerotherapy    Date/Time: 9/19/2024 10:43 AM    Performed by: Nicola Jaquez MD  Authorized by: Nicola Jaquez MD    Type:  Medically Necessary  Vein:  Multiple Veins  Yes    Procedure side:  Bilateral  Solution/Amount:  .5 POLIDOCANOL  Syringes:  10   2 syringes of 1% (1 ml) polidocanol and 6 syrines of 0.5%      Operative description  Indications: This is a very pleasant 39-year-old female with pain in the location of varicose and reticular veins in both lower extremities.  Conservative management with external compression has failed.  Will plan to proceed with ultrasound-guided foam sclerotherapy in both lower extremities.    Procedure: Informed written consent was obtained.  Patient was initially placed in supine position.  With the help of sonography I marked out the largest tributaries of the varicose veins around the right ankle area.  With the help of sonography this was accessed with a microneedle and 2 mL of foam 1% polidocanol was injected.  Similarly the cluster in the left ankle area was isolated with sonography and the largest tributary was selected for access and injected with 1% foamed polidocanol.    Other areas of reticular veins were injected with 0.5% formed polidocanol.    Patient tolerated the procedure well.    Compression hose were applied.      Nicola Jaquez MD

## 2024-09-19 NOTE — LETTER
9/19/2024      Rosette Aguilar  [address] [facility] [address]      Dear Colleague,    Thank you for referring your patient, Rosette Aguilar, to the [facility]IN [facility] [place]. Please see a copy of my visit note below.        Vein Clinic Procedure Note    Preoperative diagnosis:    1.  Bilateral lower extremity chronic venous insufficiency.  2.  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    Post operative diagnosis:  Same    Procedure:  1.  Ultrasound-guided right lower extremity foam sclerotherapy.  2.  Ultrasound-guided left lower extremity foam sclerotherapy.    Sclerotherapy    Date/Time: 9/19/2024 10:43 AM    Performed by: Nicola Jaquez MD  Authorized by: Nicola Jaquez MD    Type:  Medically Necessary  Vein:  Multiple Veins  Yes    Procedure side:  Bilateral  Solution/Amount:  .5 POLIDOCANOL  Syringes:  10   2 syringes of 1% (1 ml) polidocanol and 6 syrines of 0.5%      Operative description  Indications: This is a very pleasant 39-year-old female with pain in the location of varicose and reticular veins in both lower extremities.  Conservative management with external compression has failed.  Will plan to proceed with ultrasound-guided foam sclerotherapy in both lower extremities.    Procedure: Informed written consent was obtained.  Patient was initially placed in supine position.  With the help of sonography I marked out the largest tributaries of the varicose veins around the right ankle area.  With the help of sonography this was accessed with a microneedle and 2 mL of foam 1% polidocanol was injected.  Similarly the cluster in the left ankle area was isolated with sonography and the largest tributary was selected for access and injected with 1% foamed polidocanol.    Other areas of reticular veins were injected with 0.5% formed polidocanol.    Patient tolerated the procedure well.    Compression  hose were applied.      Nicola Jaquez MD    Again, thank you for allowing me to participate in the care of your patient.        Sincerely,        Nicola Jaquez MD

## 2024-10-16 ENCOUNTER — TELEPHONE (OUTPATIENT)
Dept: FAMILY MEDICINE | Facility: CLINIC | Age: 39
End: 2024-10-16

## 2024-10-16 ENCOUNTER — LAB (OUTPATIENT)
Dept: LAB | Facility: CLINIC | Age: 39
End: 2024-10-16
Payer: COMMERCIAL

## 2024-10-16 DIAGNOSIS — Z30.016 ENCOUNTER FOR INITIAL PRESCRIPTION OF TRANSDERMAL PATCH HORMONAL CONTRACEPTIVE DEVICE: Primary | ICD-10-CM

## 2024-10-16 DIAGNOSIS — Z86.32 HISTORY OF GESTATIONAL DIABETES: ICD-10-CM

## 2024-10-16 DIAGNOSIS — E78.00 ELEVATED CHOLESTEROL: ICD-10-CM

## 2024-10-16 LAB
CHOLEST SERPL-MCNC: 178 MG/DL
EST. AVERAGE GLUCOSE BLD GHB EST-MCNC: 108 MG/DL
FASTING STATUS PATIENT QL REPORTED: YES
HBA1C MFR BLD: 5.4 % (ref 0–5.6)
HDLC SERPL-MCNC: 49 MG/DL
LDLC SERPL CALC-MCNC: 95 MG/DL
NONHDLC SERPL-MCNC: 129 MG/DL
TRIGL SERPL-MCNC: 170 MG/DL

## 2024-10-16 PROCEDURE — 36415 COLL VENOUS BLD VENIPUNCTURE: CPT

## 2024-10-16 PROCEDURE — 83036 HEMOGLOBIN GLYCOSYLATED A1C: CPT

## 2024-10-16 PROCEDURE — 80061 LIPID PANEL: CPT

## 2024-10-16 RX ORDER — NORELGESTROMIN AND ETHINYL ESTRADIOL 35; 150 UG/MG; UG/MG
PATCH TRANSDERMAL
Status: CANCELLED | OUTPATIENT
Start: 2024-10-16

## 2024-10-16 NOTE — TELEPHONE ENCOUNTER
Routing to TC's. Please call pt and help schedule appt. Pt cannot get birth control patch without a visit concerning her high BP at previous appt.    EVELIA DuranN, RN     [facility]    10/16/2024 at 2:51 PM

## 2024-10-16 NOTE — TELEPHONE ENCOUNTER
LVM message requesting a call back for an appt. Two more attempts will be made.     Ana Copeland   [facility]

## 2024-10-16 NOTE — TELEPHONE ENCOUNTER
Pt has called in checking on the status of her birth control.     Pt has scheduled a BP appt and would like to know if she can now get her birth control since she is scheduled to come in.     Routing to the provider to review.     Ana Copeland   [facility]

## 2024-10-16 NOTE — TELEPHONE ENCOUNTER
Routing to Natali. Please see pt's MCM and advise. Pt has been on and off patch since 2019. Ok to restart or would you like VV or OV to discuss further?      T'd up ethinyl estradiol-norelgestrom (ORTHO EVRA) 150-35 mcg/24 hr patch. If appropriate, please review and edit order as necessary.     Sonal Chan, EVELIAN, RN     [facility]    10/16/2024 at 2:35 PM

## 2024-10-16 NOTE — TELEPHONE ENCOUNTER
Medication Question or Refill    Contacts       Contact Date/Time Type Contact Phone/Fax    10/16/2024 01:29 PM CDT Phone (Incoming) Rosette Aguilardalupe (Self) [phone] (M)            What medication are you calling about (include dose and sig)?: Birth control patch    Preferred Pharmacy:   [facility] [place], [place] - [address] [facility]  [address] [facility] [address]  Phone: [phone] Fax: [phone]      Controlled Substance Agreement on file:   CSA -- Patient Level:    CSA: None found at the patient level.       Who prescribed the medication?: Natali Izquierdo    Do you need a refill? Yes, Patient would like to go back onto the patch    When did you use the medication last?     Patient offered an appointment? No    Do you have any questions or concerns?  Yes: call patient       Could we send this information to you in [facility] or would you prefer to receive a phone call?:   Patient would prefer a phone call   Okay to leave a detailed message?: Yes at Cell number on file:    Telephone Information:   Mobile [phone]

## 2024-10-17 RX ORDER — NORELGESTROMIN AND ETHINYL ESTRADIOL 35; 150 UG/MG; UG/MG
PATCH TRANSDERMAL
Qty: 3 PATCH | Refills: 0 | Status: SHIPPED | OUTPATIENT
Start: 2024-10-17

## 2024-10-24 ENCOUNTER — TELEPHONE (OUTPATIENT)
Dept: FAMILY MEDICINE | Facility: CLINIC | Age: 39
End: 2024-10-24

## 2024-10-24 ENCOUNTER — ALLIED HEALTH/NURSE VISIT (OUTPATIENT)
Dept: FAMILY MEDICINE | Facility: CLINIC | Age: 39
End: 2024-10-24
Payer: COMMERCIAL

## 2024-10-24 VITALS — SYSTOLIC BLOOD PRESSURE: 129 MMHG | DIASTOLIC BLOOD PRESSURE: 88 MMHG

## 2024-10-24 DIAGNOSIS — Z01.30 BP CHECK: Primary | ICD-10-CM

## 2024-10-24 DIAGNOSIS — Z23 NEED FOR PROPHYLACTIC VACCINATION AND INOCULATION AGAINST INFLUENZA: ICD-10-CM

## 2024-10-24 PROCEDURE — 99207 PR NO CHARGE NURSE ONLY: CPT

## 2024-10-24 PROCEDURE — 90656 IIV3 VACC NO PRSV 0.5 ML IM: CPT

## 2024-10-24 PROCEDURE — 90471 IMMUNIZATION ADMIN: CPT

## 2024-10-24 NOTE — TELEPHONE ENCOUNTER
BP Readings from Last 6 Encounters:   10/24/24 129/88   07/30/24 138/80   07/16/24 (!) 152/103   06/28/24 (!) 139/97   06/05/23 130/80   05/22/23 (!) 149/96

## 2024-10-24 NOTE — PROGRESS NOTES
Rosette Aguilar is a 39 year old patient who comes in today for a Blood Pressure check.  Initial BP:  /88 (BP Location: Right arm, Patient Position: Sitting, Cuff Size: Adult Large)      Data Unavailable  Disposition: follow-up as previously indicated by provider

## 2024-10-31 ENCOUNTER — OFFICE VISIT (OUTPATIENT)
Dept: VASCULAR SURGERY | Facility: CLINIC | Age: 39
End: 2024-10-31
Payer: COMMERCIAL

## 2024-10-31 DIAGNOSIS — I83.813 VARICOSE VEINS OF LOWER EXTREMITY WITH PAIN, BILATERAL: Primary | ICD-10-CM

## 2024-10-31 PROCEDURE — 36471 NJX SCLRSNT MLT INCMPTNT VN: CPT | Mod: 50 | Performed by: SURGERY

## 2024-10-31 PROCEDURE — 76942 ECHO GUIDE FOR BIOPSY: CPT | Performed by: SURGERY

## 2024-10-31 NOTE — PROGRESS NOTES
Vein Clinic Procedure Note    Preoperative diagnosis:    1.  Bilateral lower extremity chronic venous insufficiency.  2.  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    Post operative diagnosis:  Same    Procedure:  1.  Ultrasound-guided right lower extremity foam sclerotherapy.  2.  Guided Left Lower Extremity for Sclerotherapy.    Procedures    Operative description  Indications: Is a very pleasant 39-year-old female with pain in the location of varicose and reticular veins in both lower extremities.  Conservative management and external compression has failed.  We will plan to proceed with foam sclerotherapy with a guidance of ultrasonography.    Procedure:  Informed written consent was obtained.  Patient was initially placed in supine position.  With the help of sonography I marked out the largest tributaries of the varicose veins around the right ankle area.  With the help of sonography this was accessed with a microneedle and 2 mL of foam 1% polidocanol was injected.  Similarly the cluster in the left ankle area was isolated with sonography and the largest tributary was selected for access and injected with 1% foamed polidocanol.     Other areas of reticular veins were injected with 0.5% formed polidocanol.     Patient tolerated the procedure well.     Compression hose were applied.    VNUS: Not applicable    Stab phlebectomy: Not applicable    EBL: 3 mL    Nicola Jaquez MD

## 2024-10-31 NOTE — LETTER
10/31/2024      Rosette Aguilar  [address] [facility] [address]      Dear Colleague,    Thank you for referring your patient, Rosette Aguilar, to the [facility]IN [facility] [place]. Please see a copy of my visit note below.        Vein Clinic Procedure Note    Preoperative diagnosis:    1.  Bilateral lower extremity chronic venous insufficiency.  2.  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    Post operative diagnosis:  Same    Procedure:  1.  Ultrasound-guided right lower extremity foam sclerotherapy.  2.  Guided Left Lower Extremity for Sclerotherapy.    Procedures    Operative description  Indications: Is a very pleasant 39-year-old female with pain in the location of varicose and reticular veins in both lower extremities.  Conservative management and external compression has failed.  We will plan to proceed with foam sclerotherapy with a guidance of ultrasonography.    Procedure:  Informed written consent was obtained.  Patient was initially placed in supine position.  With the help of sonography I marked out the largest tributaries of the varicose veins around the right ankle area.  With the help of sonography this was accessed with a microneedle and 2 mL of foam 1% polidocanol was injected.  Similarly the cluster in the left ankle area was isolated with sonography and the largest tributary was selected for access and injected with 1% foamed polidocanol.     Other areas of reticular veins were injected with 0.5% formed polidocanol.     Patient tolerated the procedure well.     Compression hose were applied.    VNUS: Not applicable    Stab phlebectomy: Not applicable    EBL: 3 mL    Nicola Jaquez MD      Again, thank you for allowing me to participate in the care of your patient.        Sincerely,        Nicola Jaquez MD

## 2024-11-26 ENCOUNTER — MYC MEDICAL ADVICE (OUTPATIENT)
Dept: FAMILY MEDICINE | Facility: CLINIC | Age: 39
End: 2024-11-26
Payer: COMMERCIAL

## 2024-11-26 DIAGNOSIS — Z30.016 ENCOUNTER FOR INITIAL PRESCRIPTION OF TRANSDERMAL PATCH HORMONAL CONTRACEPTIVE DEVICE: ICD-10-CM

## 2024-11-26 RX ORDER — NORELGESTROMIN AND ETHINYL ESTRADIOL 35; 150 UG/MG; UG/MG
PATCH TRANSDERMAL
Qty: 3 PATCH | Refills: 1 | Status: SHIPPED | OUTPATIENT
Start: 2024-11-26

## 2024-12-03 ENCOUNTER — TELEPHONE (OUTPATIENT)
Dept: FAMILY MEDICINE | Facility: CLINIC | Age: 39
End: 2024-12-03
Payer: COMMERCIAL

## 2024-12-03 NOTE — TELEPHONE ENCOUNTER
Pt calls.    She is looking for birth control patches.  Advised they were sent in on 11/26/24.  Advised to speak to someone at the pharmacy.

## 2025-02-10 DIAGNOSIS — Z30.016 ENCOUNTER FOR INITIAL PRESCRIPTION OF TRANSDERMAL PATCH HORMONAL CONTRACEPTIVE DEVICE: ICD-10-CM

## 2025-02-10 RX ORDER — NORELGESTROMIN AND ETHINYL ESTRADIOL 35; 150 UG/MG; UG/MG
PATCH TRANSDERMAL
Qty: 9 PATCH | Refills: 1 | Status: SHIPPED | OUTPATIENT
Start: 2025-02-10

## 2025-02-10 NOTE — TELEPHONE ENCOUNTER
Pt calls to request birth control patch refill at 90 day supply. Confirmed pharmacy. Please review and advise, thanks!  Nima Ocampo RN on 2/10/2025 at 2:08 PM

## 2025-02-12 ENCOUNTER — OFFICE VISIT (OUTPATIENT)
Dept: VASCULAR SURGERY | Facility: CLINIC | Age: 40
End: 2025-02-12
Attending: SURGERY
Payer: COMMERCIAL

## 2025-02-12 ENCOUNTER — ANCILLARY PROCEDURE (OUTPATIENT)
Dept: ULTRASOUND IMAGING | Facility: CLINIC | Age: 40
End: 2025-02-12
Attending: SURGERY
Payer: COMMERCIAL

## 2025-02-12 DIAGNOSIS — I83.813 VARICOSE VEINS OF LOWER EXTREMITY WITH PAIN, BILATERAL: Primary | ICD-10-CM

## 2025-02-12 DIAGNOSIS — I83.813 VARICOSE VEINS OF BOTH LOWER EXTREMITIES WITH PAIN: ICD-10-CM

## 2025-02-12 PROCEDURE — 99213 OFFICE O/P EST LOW 20 MIN: CPT | Performed by: SURGERY

## 2025-02-12 PROCEDURE — 93970 EXTREMITY STUDY: CPT | Performed by: SURGERY

## 2025-02-12 NOTE — LETTER
2/12/2025      Rosette Aguilar  [address] [place] [address]      Dear Colleague,    Thank you for referring your patient, Rosette Aguilar, to the [facility] VEIN CLIN[facility] [place]. Please see a copy of my visit note below.    It was a pleasure again to see Mrs. Aguilar today.  She is a very pleasant 39-year-old female who I had seen previously for painful reticular and varicose veins in both lower extremities.  We have done 2 sessions of ultrasound-guided injection sclerotherapy.  Although there is improvement she still continues to have symptoms which affect her work and day-to-day living.  She tells me that the pain is located in the area of the varicose veins.  Pain is not present in the morning.  Pain progresses during the day.  She has 4 children and the youngest of the children is 4 years old.  During pregnancy she wore compression stockings.  She has been wearing compression stocking thereafter.  Pain is exacerbated by standing and doing activities of day-to-day living.  Elevation of legs seems to help the symptoms.  She also describes burning and pruritus.  She also experiences edema.  She also notices cramping and heaviness of the lower extremities.  She has dysmenorrhea and menorrhagia but does not have pelvic pain or dyspareunia. We have done injection sclerotherapy but there still are residual veins.  Today she underwent sonography.    The majority of the right great saphenous vein is incompetent except for the saphenofemoral junction.  There is a 10 cm segment of the great saphenous vein in the proximal calf that is noncompressible and shows chronic thrombus.  Left great saphenous vein is also competent.    Although I do not see the need for any great saphenous or anterior accessory saphenous vein ablation I think she will benefit from injection sclerotherapy with ultrasound guidance of her varicose and reticular veins.    We will submit this to her insurance company.      Again,  thank you for allowing me to participate in the care of your patient.        Sincerely,        Nicola Jaquez MD    Electronically signed Detail Level: Generalized Detail Level: Zone Detail Level: Detailed

## 2025-02-12 NOTE — PROGRESS NOTES
It was a pleasure again to see Mrs. Aguilar today.  She is a very pleasant 39-year-old female who I had seen previously for painful reticular and varicose veins in both lower extremities.  We have done 2 sessions of ultrasound-guided injection sclerotherapy.  Although there is improvement she still continues to have symptoms which affect her work and day-to-day living.  She tells me that the pain is located in the area of the varicose veins.  Pain is not present in the morning.  Pain progresses during the day.  She has 4 children and the youngest of the children is 4 years old.  During pregnancy she wore compression stockings.  She has been wearing compression stocking thereafter.  Pain is exacerbated by standing and doing activities of day-to-day living.  Elevation of legs seems to help the symptoms.  She also describes burning and pruritus.  She also experiences edema.  She also notices cramping and heaviness of the lower extremities.  She has dysmenorrhea and menorrhagia but does not have pelvic pain or dyspareunia. We have done injection sclerotherapy but there still are residual veins.  Today she underwent sonography.    The majority of the right great saphenous vein is incompetent except for the saphenofemoral junction.  There is a 10 cm segment of the great saphenous vein in the proximal calf that is noncompressible and shows chronic thrombus.  Left great saphenous vein is also competent.    Although I do not see the need for any great saphenous or anterior accessory saphenous vein ablation I think she will benefit from injection sclerotherapy with ultrasound guidance of her varicose and reticular veins.    We will submit this to her insurance company.

## 2025-03-19 ENCOUNTER — OFFICE VISIT (OUTPATIENT)
Dept: VASCULAR SURGERY | Facility: CLINIC | Age: 40
End: 2025-03-19
Payer: COMMERCIAL

## 2025-03-19 DIAGNOSIS — I83.813 VARICOSE VEINS OF BOTH LOWER EXTREMITIES WITH PAIN: Primary | ICD-10-CM

## 2025-03-19 PROCEDURE — 36471 NJX SCLRSNT MLT INCMPTNT VN: CPT | Mod: 50 | Performed by: SURGERY

## 2025-03-19 PROCEDURE — 76942 ECHO GUIDE FOR BIOPSY: CPT | Performed by: SURGERY

## 2025-03-19 NOTE — PROGRESS NOTES
Vein Clinic Sclerotherapy Note     Rosette Aguilar is a 39 year old female who was seen in clinic today for Sclerotherapy.    Sclerotherapy    Date/Time: 3/19/2025 2:40 PM    Performed by: Nicola Jaquez MD  Authorized by: Nicola Jaquez MD    Type:  Medically Necessary  Vein:  Multiple Veins  Yes    Procedure side:  Bilateral  Solution/Amount:  .5 POLIDOCANOL  Syringes:  8  Wrap/Hose:  Hose    Procedure: Ultrasound-guided bilateral lower extremity sclerotherapy, medically necessary    Indication for procedure: This is a 39-year-old female with lower extremity reticular veins which are symptomatic.  Ultrasound-guided sclerotherapy is advised.    Procedure details: Patient was identified and then she gave us written informed consent.  With the help of ultrasound multiple clusters of reticular veins were located and injected with 0.5% polidocanol under sonographic guidance and aseptic technique.  Compression garment was then worn.  Patient tolerated the procedure well.    Nicola Jaquez MD

## 2025-03-19 NOTE — LETTER
3/19/2025      Rosette Aguilar  [address] [facility] [address]      Dear Colleague,    Thank you for referring your patient, Rosette Aguilar, to the [facility] VEIN CL[facility] [place]. Please see a copy of my visit note below.        Vein Clinic Sclerotherapy Note     Rosette Aguilar is a 39 year old female who was seen in clinic today for Sclerotherapy.    Sclerotherapy    Date/Time: 3/19/2025 2:40 PM    Performed by: Nicola Jaquez MD  Authorized by: Nicola Jaquez MD    Type:  Medically Necessary  Vein:  Multiple Veins  Yes    Procedure side:  Bilateral  Solution/Amount:  .5 POLIDOCANOL  Syringes:  8  Wrap/Hose:  Hose    Procedure: Ultrasound-guided bilateral lower extremity sclerotherapy, medically necessary    Indication for procedure: This is a 39-year-old female with lower extremity reticular veins which are symptomatic.  Ultrasound-guided sclerotherapy is advised.    Procedure details: Patient was identified and then she gave us written informed consent.  With the help of ultrasound multiple clusters of reticular veins were located and injected with 0.5% polidocanol under sonographic guidance and aseptic technique.  Compression garment was then worn.  Patient tolerated the procedure well.    Nicola Jaquez MD      Again, thank you for allowing me to participate in the care of your patient.        Sincerely,        Nicola Jaquez MD    Electronically signed

## 2025-05-07 ENCOUNTER — OFFICE VISIT (OUTPATIENT)
Dept: VASCULAR SURGERY | Facility: CLINIC | Age: 40
End: 2025-05-07
Payer: COMMERCIAL

## 2025-05-07 DIAGNOSIS — I83.813 VARICOSE VEINS OF BOTH LOWER EXTREMITIES WITH PAIN: Primary | ICD-10-CM

## 2025-05-07 PROCEDURE — 76942 ECHO GUIDE FOR BIOPSY: CPT | Performed by: SURGERY

## 2025-05-07 PROCEDURE — 36471 NJX SCLRSNT MLT INCMPTNT VN: CPT | Mod: 50 | Performed by: SURGERY

## 2025-05-07 NOTE — LETTER
5/7/2025      Rosette Aguilar  [address] [facility] [address]      Dear Colleague,    Thank you for referring your patient, Rosette Aguilar, to the [facility] VEIN CLIN[facility] [place]. Please see a copy of my visit note below.        Vein Clinic Procedure Note    Sclerotherapy    Date/Time: 5/7/2025 2:27 PM    Performed by: Nicola Jaquez MD  Authorized by: Nicola Jaquez MD    Type:  Medically Necessary  Vein:  Multiple Veins  Yes    Procedure side:  Bilateral  Solution/Amount:  .5 POLIDOCANOL  Syringes:  6  Wrap/Hose:  Hose      Operative description  Indications: Painful reticular veins of bilateral lower extremities.    Procedure: Ultrasound-guided foam sclerotherapy of multiple reticular veins in bilateral lower extremities.    VNUS: Not applicable.    Stab phlebectomy: Not applicable.    EBL: 2 mL    Patient was brought to the procedure room and placed in supine position.  Written informed consent was obtained.  Under aseptic technique multiple clusters of these painful reticular veins were identified with the largest 1 being used for access and delivery of 1-1 formed polidocanol.  This was 0.5% polidocanol.  I used a total of 6 syringes.  Patient tolerated the procedure well.  Compression hose were applied.        5/7/2025    12:52 PM   Flowsheet Data   Side: Bilateral     Nicola Jaquez MD      Again, thank you for allowing me to participate in the care of your patient.        Sincerely,        Nicola Jaquez MD    Electronically signed

## 2025-05-07 NOTE — PROGRESS NOTES
Vein Clinic Procedure Note    Sclerotherapy    Date/Time: 5/7/2025 2:27 PM    Performed by: Nicola Jaquez MD  Authorized by: Nicola Jaquez MD    Type:  Medically Necessary  Vein:  Multiple Veins  Yes    Procedure side:  Bilateral  Solution/Amount:  .5 POLIDOCANOL  Syringes:  6  Wrap/Hose:  Hose      Operative description  Indications: Painful reticular veins of bilateral lower extremities.    Procedure: Ultrasound-guided foam sclerotherapy of multiple reticular veins in bilateral lower extremities.    VNUS: Not applicable.    Stab phlebectomy: Not applicable.    EBL: 2 mL    Patient was brought to the procedure room and placed in supine position.  Written informed consent was obtained.  Under aseptic technique multiple clusters of these painful reticular veins were identified with the largest 1 being used for access and delivery of 1-1 formed polidocanol.  This was 0.5% polidocanol.  I used a total of 6 syringes.  Patient tolerated the procedure well.  Compression hose were applied.        5/7/2025    12:52 PM   Flowsheet Data   Side: Bilateral     Nicola Jaquez MD

## 2025-06-16 ENCOUNTER — PATIENT OUTREACH (OUTPATIENT)
Dept: CARE COORDINATION | Facility: CLINIC | Age: 40
End: 2025-06-16
Payer: COMMERCIAL

## 2025-06-18 ENCOUNTER — OFFICE VISIT (OUTPATIENT)
Dept: VASCULAR SURGERY | Facility: CLINIC | Age: 40
End: 2025-06-18
Payer: COMMERCIAL

## 2025-06-18 DIAGNOSIS — I83.813 VARICOSE VEINS OF LOWER EXTREMITY WITH PAIN, BILATERAL: Primary | ICD-10-CM

## 2025-06-18 DIAGNOSIS — I83.819 VARICOSE VEINS WITH PAIN: Primary | ICD-10-CM

## 2025-06-18 PROCEDURE — 99213 OFFICE O/P EST LOW 20 MIN: CPT | Performed by: SURGERY

## 2025-06-18 RX ORDER — TRIAMCINOLONE ACETONIDE 1 MG/G
OINTMENT TOPICAL 2 TIMES DAILY
Qty: 80 G | Refills: 1 | Status: SHIPPED | OUTPATIENT
Start: 2025-06-18

## 2025-06-18 NOTE — LETTER
6/18/2025      Rosette Aguilar  [address] [facility] [address]      Dear Colleague,    Thank you for referring your patient, Rosette Aguilar, to the [facility] VEIN CLIN[facility] [place]. Please see a copy of my visit note below.    It was a pleasure to see Rosette Aguilar in the vein clinic today.  She is an exceedingly pleasant 39-year-old mother of 4 children who we have done previous ultrasound-guided sclerotherapy for varicose and reticular veins.    She has responded well but has developed more reticular and varicose veins.  However I would not recommend treatment today because she has had reaction to the sclerotherapy injection with ulceration.    She is also concerned about hyperpigmentation around the right ankle.    Further ulceration (which is nearly healed by itself) reviewed continue to use antibiotic ointment.  Would recommend triamcinolone ointment to the hyperpigmented areas.    I will see her back in 6 weeks to reassess and see if it is suitable to proceed with injection sclerotherapy.      Again, thank you for allowing me to participate in the care of your patient.        Sincerely,        Nicola Jaquez MD    Electronically signed

## 2025-06-18 NOTE — PROGRESS NOTES
It was a pleasure to see Rosette Aguilar in the vein clinic today.  She is an exceedingly pleasant 39-year-old mother of 4 children who we have done previous ultrasound-guided sclerotherapy for varicose and reticular veins.    She has responded well but has developed more reticular and varicose veins.  However I would not recommend treatment today because she has had reaction to the sclerotherapy injection with ulceration.    She is also concerned about hyperpigmentation around the right ankle.    Further ulceration (which is nearly healed by itself) reviewed continue to use antibiotic ointment.  Would recommend triamcinolone ointment to the hyperpigmented areas.    I will see her back in 6 weeks to reassess and see if it is suitable to proceed with injection sclerotherapy.

## 2025-06-19 ENCOUNTER — TELEPHONE (OUTPATIENT)
Dept: DERMATOLOGY | Facility: CLINIC | Age: 40
End: 2025-06-19
Payer: COMMERCIAL

## 2025-06-19 DIAGNOSIS — I83.819 VARICOSE VEINS WITH PAIN: ICD-10-CM

## 2025-06-19 NOTE — TELEPHONE ENCOUNTER
You had prescribe a qty of 80 grams, we need to know how long this should last the patient per insurance,  Otherwise we would have to bill qty 15 grams for 90 days. If we think that patient would use more could you let us know how long 80 grams should last the patient so the patient will not be out of the medication.     Cream and ointment need to show how long the tube\jar last    Thank you.    aPola Cisneros, [facility]  [address] [place] [address] [phone]

## 2025-06-30 ENCOUNTER — PATIENT OUTREACH (OUTPATIENT)
Dept: CARE COORDINATION | Facility: CLINIC | Age: 40
End: 2025-06-30
Payer: COMMERCIAL

## 2025-07-23 DIAGNOSIS — Z30.016 ENCOUNTER FOR INITIAL PRESCRIPTION OF TRANSDERMAL PATCH HORMONAL CONTRACEPTIVE DEVICE: ICD-10-CM

## 2025-07-23 RX ORDER — NORELGESTROMIN AND ETHINYL ESTRADIOL 35; 150 UG/MG; UG/MG
PATCH TRANSDERMAL
Qty: 9 PATCH | Refills: 0 | Status: SHIPPED | OUTPATIENT
Start: 2025-07-23

## 2025-08-01 ENCOUNTER — ANCILLARY PROCEDURE (OUTPATIENT)
Dept: MAMMOGRAPHY | Facility: CLINIC | Age: 40
End: 2025-08-01
Attending: NURSE PRACTITIONER
Payer: COMMERCIAL

## 2025-08-01 DIAGNOSIS — Z12.31 VISIT FOR SCREENING MAMMOGRAM: ICD-10-CM

## 2025-08-01 PROCEDURE — 77063 BREAST TOMOSYNTHESIS BI: CPT | Mod: TC | Performed by: RADIOLOGY

## 2025-08-01 PROCEDURE — 77067 SCR MAMMO BI INCL CAD: CPT | Mod: TC | Performed by: RADIOLOGY
